# Patient Record
Sex: FEMALE | Race: WHITE | NOT HISPANIC OR LATINO
[De-identification: names, ages, dates, MRNs, and addresses within clinical notes are randomized per-mention and may not be internally consistent; named-entity substitution may affect disease eponyms.]

---

## 2019-04-26 PROBLEM — Z00.00 ENCOUNTER FOR PREVENTIVE HEALTH EXAMINATION: Status: ACTIVE | Noted: 2019-04-26

## 2019-05-28 ENCOUNTER — LABORATORY RESULT (OUTPATIENT)
Age: 79
End: 2019-05-28

## 2019-05-28 ENCOUNTER — APPOINTMENT (OUTPATIENT)
Dept: PULMONOLOGY | Facility: CLINIC | Age: 79
End: 2019-05-28
Payer: MEDICARE

## 2019-05-28 VITALS
SYSTOLIC BLOOD PRESSURE: 170 MMHG | DIASTOLIC BLOOD PRESSURE: 96 MMHG | OXYGEN SATURATION: 95 % | WEIGHT: 217 LBS | BODY MASS INDEX: 37.97 KG/M2 | TEMPERATURE: 97.2 F | HEART RATE: 99 BPM | RESPIRATION RATE: 18 BRPM | HEIGHT: 63.39 IN

## 2019-05-28 DIAGNOSIS — Z87.891 PERSONAL HISTORY OF NICOTINE DEPENDENCE: ICD-10-CM

## 2019-05-28 PROCEDURE — 94060 EVALUATION OF WHEEZING: CPT

## 2019-05-28 PROCEDURE — 99204 OFFICE O/P NEW MOD 45 MIN: CPT | Mod: 25

## 2019-05-28 PROCEDURE — 94726 PLETHYSMOGRAPHY LUNG VOLUMES: CPT

## 2019-05-28 PROCEDURE — 94729 DIFFUSING CAPACITY: CPT

## 2019-05-28 PROCEDURE — ZZZZZ: CPT

## 2019-05-28 RX ORDER — OMEPRAZOLE 40 MG/1
40 CAPSULE, DELAYED RELEASE ORAL
Refills: 0 | Status: ACTIVE | COMMUNITY

## 2019-05-28 RX ORDER — MELOXICAM 15 MG/1
15 TABLET ORAL
Refills: 0 | Status: ACTIVE | COMMUNITY

## 2019-05-28 NOTE — ASSESSMENT
[FreeTextEntry1] : 1. Moderate copd: Agree with trelegy. Discussed technique. Will also check 2D echo and BNP to r/o cardiac component. Encourage exercise, weight loss.

## 2019-05-28 NOTE — REVIEW OF SYSTEMS
[As Noted in HPI] : as noted in HPI [Hay Fever] : hay fever [Heartburn] : heartburn [Myalgias] : myalgias [Arthralgias] : arthralgias [Negative] : Psychiatric [de-identified] : easy burising

## 2019-05-28 NOTE — HISTORY OF PRESENT ILLNESS
[FreeTextEntry1] : Patient with hx of COPD, used to be on symbicort and spiriva, now on Trelegy. Has noticed worsening swelling of her lower extremities, but nonpitting. Patient has noticed coughing as well as difficulty breathing. No hx of heart disease, slightly increased A1C.

## 2019-05-28 NOTE — PHYSICAL EXAM
[Normal Appearance] : normal appearance [General Appearance - Well Developed] : well developed [No Deformities] : no deformities [Well Groomed] : well groomed [General Appearance - Well Nourished] : well nourished [General Appearance - In No Acute Distress] : no acute distress [Normal Conjunctiva] : the conjunctiva exhibited no abnormalities [Eyelids - No Xanthelasma] : the eyelids demonstrated no xanthelasmas [Normal Oropharynx] : normal oropharynx [Neck Appearance] : the appearance of the neck was normal [Neck Cervical Mass (___cm)] : no neck mass was observed [Jugular Venous Distention Increased] : there was no jugular-venous distention [Thyroid Nodule] : there were no palpable thyroid nodules [Thyroid Diffuse Enlargement] : the thyroid was not enlarged [Heart Rate And Rhythm] : heart rate and rhythm were normal [Heart Sounds] : normal S1 and S2 [Murmurs] : no murmurs present [Respiration, Rhythm And Depth] : normal respiratory rhythm and effort [Exaggerated Use Of Accessory Muscles For Inspiration] : no accessory muscle use [Auscultation Breath Sounds / Voice Sounds] : lungs were clear to auscultation bilaterally [Abdomen Soft] : soft [Abdomen Tenderness] : non-tender [Abdomen Mass (___ Cm)] : no abdominal mass palpated [Abnormal Walk] : normal gait [Gait - Sufficient For Exercise Testing] : the gait was sufficient for exercise testing [Nail Clubbing] : no clubbing of the fingernails [Cyanosis, Localized] : no localized cyanosis [Petechial Hemorrhages (___cm)] : no petechial hemorrhages [] : no ischemic changes

## 2019-05-29 LAB
ALBUMIN SERPL ELPH-MCNC: 4.5 G/DL
ALP BLD-CCNC: 79 U/L
ALT SERPL-CCNC: 17 U/L
ANION GAP SERPL CALC-SCNC: 12 MMOL/L
AST SERPL-CCNC: 19 U/L
BASOPHILS # BLD AUTO: 0.28 K/UL
BASOPHILS NFR BLD AUTO: 4.8 %
BILIRUB SERPL-MCNC: 0.4 MG/DL
BUN SERPL-MCNC: 13 MG/DL
CALCIUM SERPL-MCNC: 9.6 MG/DL
CHLORIDE SERPL-SCNC: 105 MMOL/L
CO2 SERPL-SCNC: 26 MMOL/L
CREAT SERPL-MCNC: 0.84 MG/DL
EOSINOPHIL # BLD AUTO: 0.17 K/UL
EOSINOPHIL NFR BLD AUTO: 2.9 %
GLUCOSE SERPL-MCNC: 114 MG/DL
HCT VFR BLD CALC: 39.9 %
HGB BLD-MCNC: 12.3 G/DL
IMM GRANULOCYTES NFR BLD AUTO: 0.5 %
LYMPHOCYTES # BLD AUTO: 1.72 K/UL
LYMPHOCYTES NFR BLD AUTO: 29.6 %
MAN DIFF?: NORMAL
MCHC RBC-ENTMCNC: 20.4 PG
MCHC RBC-ENTMCNC: 30.8 GM/DL
MCV RBC AUTO: 66.1 FL
MONOCYTES # BLD AUTO: 0.73 K/UL
MONOCYTES NFR BLD AUTO: 12.5 %
NEUTROPHILS # BLD AUTO: 2.89 K/UL
NEUTROPHILS NFR BLD AUTO: 49.7 %
NT-PROBNP SERPL-MCNC: 371 PG/ML
PLATELET # BLD AUTO: 403 K/UL
POTASSIUM SERPL-SCNC: 4.7 MMOL/L
PROT SERPL-MCNC: 7.9 G/DL
RBC # BLD: 6.04 M/UL
RBC # FLD: 19.2 %
SODIUM SERPL-SCNC: 143 MMOL/L
WBC # FLD AUTO: 5.82 K/UL

## 2019-06-19 ENCOUNTER — APPOINTMENT (OUTPATIENT)
Dept: CV DIAGNOSITCS | Facility: HOSPITAL | Age: 79
End: 2019-06-19
Payer: MEDICARE

## 2019-06-19 ENCOUNTER — OUTPATIENT (OUTPATIENT)
Dept: OUTPATIENT SERVICES | Facility: HOSPITAL | Age: 79
LOS: 1 days | End: 2019-06-19

## 2019-06-19 DIAGNOSIS — J44.9 CHRONIC OBSTRUCTIVE PULMONARY DISEASE, UNSPECIFIED: ICD-10-CM

## 2019-06-19 PROCEDURE — 93306 TTE W/DOPPLER COMPLETE: CPT | Mod: 26

## 2019-08-13 ENCOUNTER — APPOINTMENT (OUTPATIENT)
Dept: PULMONOLOGY | Facility: CLINIC | Age: 79
End: 2019-08-13
Payer: MEDICARE

## 2019-08-13 VITALS
HEIGHT: 63 IN | SYSTOLIC BLOOD PRESSURE: 159 MMHG | BODY MASS INDEX: 35.44 KG/M2 | OXYGEN SATURATION: 98 % | HEART RATE: 99 BPM | TEMPERATURE: 97.5 F | WEIGHT: 200 LBS | RESPIRATION RATE: 18 BRPM | DIASTOLIC BLOOD PRESSURE: 68 MMHG

## 2019-08-13 DIAGNOSIS — R06.02 SHORTNESS OF BREATH: ICD-10-CM

## 2019-08-13 PROCEDURE — 99214 OFFICE O/P EST MOD 30 MIN: CPT

## 2019-08-13 NOTE — PHYSICAL EXAM
[General Appearance - Well Developed] : well developed [Normal Appearance] : normal appearance [Well Groomed] : well groomed [General Appearance - Well Nourished] : well nourished [General Appearance - In No Acute Distress] : no acute distress [No Deformities] : no deformities [Normal Conjunctiva] : the conjunctiva exhibited no abnormalities [Eyelids - No Xanthelasma] : the eyelids demonstrated no xanthelasmas [Normal Oropharynx] : normal oropharynx [Neck Appearance] : the appearance of the neck was normal [Neck Cervical Mass (___cm)] : no neck mass was observed [Jugular Venous Distention Increased] : there was no jugular-venous distention [Thyroid Diffuse Enlargement] : the thyroid was not enlarged [Thyroid Nodule] : there were no palpable thyroid nodules [Heart Sounds] : normal S1 and S2 [Heart Rate And Rhythm] : heart rate and rhythm were normal [Murmurs] : no murmurs present [Respiration, Rhythm And Depth] : normal respiratory rhythm and effort [Exaggerated Use Of Accessory Muscles For Inspiration] : no accessory muscle use [Abdomen Soft] : soft [Auscultation Breath Sounds / Voice Sounds] : lungs were clear to auscultation bilaterally [Abdomen Tenderness] : non-tender [Abdomen Mass (___ Cm)] : no abdominal mass palpated [Abnormal Walk] : normal gait [Nail Clubbing] : no clubbing of the fingernails [Gait - Sufficient For Exercise Testing] : the gait was sufficient for exercise testing [Cyanosis, Localized] : no localized cyanosis [Petechial Hemorrhages (___cm)] : no petechial hemorrhages [] : no ischemic changes

## 2019-08-13 NOTE — ASSESSMENT
[FreeTextEntry1] : 1. Moderate copd: Resume therapy with trelegy, d/c nebulizer solutions. Discussed technique. Encourage exercise, weight loss. Will see cardiologist in about 2 weeks, expected recovery time from TAVR can be 3-4 weeks and suspect that dyspnea will continue to resolve. Valve normal on auscultation, only minimal murmur detected. return to office in 1 month.

## 2019-08-13 NOTE — END OF VISIT
[FreeTextEntry3] : I directly supervised nurse practitioner Eyal Zhu and was present during key points of his history and physical. I agree with his history, physical and assessment.\par

## 2019-08-13 NOTE — REVIEW OF SYSTEMS
[Hay Fever] : hay fever [As Noted in HPI] : as noted in HPI [Heartburn] : heartburn [Myalgias] : myalgias [Arthralgias] : arthralgias [Negative] : Psychiatric [Recent Wt Loss (___ Lbs)] : recent [unfilled] ~Ulb weight loss [Dyspnea] : dyspnea [de-identified] : easy burising

## 2019-08-13 NOTE — HISTORY OF PRESENT ILLNESS
[FreeTextEntry1] : Since last visit patient had echo performed, was called with results demonstrating severe aortic stenosis.\par \par Saw cardiologist after she was called with echo results, who referred her to [place]. She had a stent placed about 4 weeks ago, followed by a TAVR at [facility] 2 weeks ago. She was also taken off Trelegy by hospital pulmonologist and started on albuterol/ipratropium and budesonide for COPD management.\par \par Today she presents still noting dyspnea on exertion. However, she has lost some weight and is increasing her activity.

## 2019-09-12 ENCOUNTER — MEDICATION RENEWAL (OUTPATIENT)
Age: 79
End: 2019-09-12

## 2019-09-23 ENCOUNTER — APPOINTMENT (OUTPATIENT)
Dept: PULMONOLOGY | Facility: CLINIC | Age: 79
End: 2019-09-23
Payer: MEDICARE

## 2019-09-23 VITALS
SYSTOLIC BLOOD PRESSURE: 168 MMHG | DIASTOLIC BLOOD PRESSURE: 78 MMHG | WEIGHT: 205 LBS | TEMPERATURE: 97.7 F | RESPIRATION RATE: 17 BRPM | OXYGEN SATURATION: 96 % | HEIGHT: 63 IN | BODY MASS INDEX: 36.32 KG/M2 | HEART RATE: 101 BPM

## 2019-09-23 DIAGNOSIS — T46.4X5A COUGH: ICD-10-CM

## 2019-09-23 DIAGNOSIS — R91.1 SOLITARY PULMONARY NODULE: ICD-10-CM

## 2019-09-23 DIAGNOSIS — R05 COUGH: ICD-10-CM

## 2019-09-23 PROCEDURE — 99214 OFFICE O/P EST MOD 30 MIN: CPT

## 2019-09-23 RX ORDER — LOSARTAN POTASSIUM 50 MG/1
50 TABLET, FILM COATED ORAL DAILY
Qty: 90 | Refills: 0 | Status: ACTIVE | COMMUNITY
Start: 2019-09-23 | End: 1900-01-01

## 2019-09-23 NOTE — CONSULT LETTER
[Dear  ___] : Dear  [unfilled], [Consult Letter:] : I had the pleasure of evaluating your patient, [unfilled]. [Sincerely,] : Sincerely, [Please see my note below.] : Please see my note below. [DrFiliberto  ___] : Dr. PAYNE [FreeTextEntry3] : Rush Beal [FreeTextEntry2] : Dr. Poli Billings\par [address]\par [facility]\par \par Dr. Blake Godinez - Interventional Cardiologist\par [address]\[place] [address]\par Ph [phone]\par Fax [phone]

## 2019-09-23 NOTE — HISTORY OF PRESENT ILLNESS
[FreeTextEntry1] : 80yo F with PMH AS s/p TAVR (July 2019), COPD presents for follow up.\par \par Since last visit patient believes her breathing is somewhat improved on Trelegy, however she has developed a persistent cough that will not go away. Cough has been present for a few weeks. On review, she takes Lisinopril 20mg and states that her cough began shortly after starting the medication. She suspected one of her cardiac medications and she self d/kathy both lisinopril and brilinta. Fortunately, she had a f/u visit with Dr. Godinez soon after in which he advised her on the risks involved with stopping brilinta and she resumed both medications. However, cough persists and is constant. Wakes her up every night. She sometimes has some clear phlegm, but most commonly dry. She notes that it is very different from her "COPD cough."\par \par She also presents with imaging, but no report of a CT performed at [facility] on 7/10/19 during TAVR hospitalization. Review demonstrates a suspicious mass in the RLL. She was told at [facility] to f/u in 3 months and not to worry about it.

## 2019-09-23 NOTE — ASSESSMENT
[FreeTextEntry1] : 1. Moderate copd: Continue therapy as directed, patient believes to have slightly improved symptoms however impossible to tell with cough present.\par \par 2. Cough: Given severity and frequency of her cough, will change lisinopril 20mg to Losartan 50mg as ACE-induced cough is most likely etiology. Patient instructed to f/u with Dr. Godinez regarding change from ACE-I to ARB as he may have an alternative preferred ARB.\par \par 3. Pulmonary nodule: No report available to review, but images uploaded to PACS. Will repeat CT scan now, at 3 month interval as suggested by [place] for comparison by our radiologists.\par \par Return to office in 2-[address] scan.

## 2019-09-23 NOTE — REVIEW OF SYSTEMS
[Recent Wt Loss (___ Lbs)] : recent [unfilled] ~Ulb weight loss [As Noted in HPI] : as noted in HPI [Dyspnea] : dyspnea [Hay Fever] : hay fever [Heartburn] : heartburn [Myalgias] : myalgias [Arthralgias] : arthralgias [Negative] : Psychiatric [de-identified] : easy burising

## 2019-09-23 NOTE — PHYSICAL EXAM
[General Appearance - Well Developed] : well developed [Normal Appearance] : normal appearance [General Appearance - Well Nourished] : well nourished [Well Groomed] : well groomed [No Deformities] : no deformities [General Appearance - In No Acute Distress] : no acute distress [Normal Conjunctiva] : the conjunctiva exhibited no abnormalities [Eyelids - No Xanthelasma] : the eyelids demonstrated no xanthelasmas [Normal Oropharynx] : normal oropharynx [Neck Appearance] : the appearance of the neck was normal [Neck Cervical Mass (___cm)] : no neck mass was observed [Jugular Venous Distention Increased] : there was no jugular-venous distention [Thyroid Diffuse Enlargement] : the thyroid was not enlarged [Thyroid Nodule] : there were no palpable thyroid nodules [Heart Rate And Rhythm] : heart rate and rhythm were normal [Murmurs] : no murmurs present [Heart Sounds] : normal S1 and S2 [Respiration, Rhythm And Depth] : normal respiratory rhythm and effort [Exaggerated Use Of Accessory Muscles For Inspiration] : no accessory muscle use [Abdomen Soft] : soft [Abdomen Tenderness] : non-tender [Abdomen Mass (___ Cm)] : no abdominal mass palpated [Abnormal Walk] : normal gait [Gait - Sufficient For Exercise Testing] : the gait was sufficient for exercise testing [Nail Clubbing] : no clubbing of the fingernails [Cyanosis, Localized] : no localized cyanosis [Petechial Hemorrhages (___cm)] : no petechial hemorrhages [] : no ischemic changes [FreeTextEntry1] : crackles b/l bases

## 2019-10-02 ENCOUNTER — OUTPATIENT (OUTPATIENT)
Dept: OUTPATIENT SERVICES | Facility: HOSPITAL | Age: 79
LOS: 1 days | End: 2019-10-02
Payer: COMMERCIAL

## 2019-10-02 ENCOUNTER — APPOINTMENT (OUTPATIENT)
Dept: CT IMAGING | Facility: CLINIC | Age: 79
End: 2019-10-02
Payer: MEDICARE

## 2019-10-02 DIAGNOSIS — R91.1 SOLITARY PULMONARY NODULE: ICD-10-CM

## 2019-10-02 PROCEDURE — 71250 CT THORAX DX C-: CPT

## 2019-10-02 PROCEDURE — 71250 CT THORAX DX C-: CPT | Mod: 26

## 2019-10-03 ENCOUNTER — CLINICAL ADVICE (OUTPATIENT)
Age: 79
End: 2019-10-03

## 2019-10-28 ENCOUNTER — CLINICAL ADVICE (OUTPATIENT)
Age: 79
End: 2019-10-28

## 2019-10-30 RX ORDER — BUDESONIDE AND FORMOTEROL FUMARATE DIHYDRATE 160; 4.5 UG/1; UG/1
160-4.5 AEROSOL RESPIRATORY (INHALATION) TWICE DAILY
Qty: 1 | Refills: 11 | Status: ACTIVE | COMMUNITY
Start: 2019-10-30

## 2019-10-30 RX ORDER — TIOTROPIUM BROMIDE 18 UG/1
18 CAPSULE ORAL; RESPIRATORY (INHALATION)
Qty: 1 | Refills: 11 | Status: ACTIVE | COMMUNITY
Start: 2019-10-30

## 2019-10-30 RX ORDER — FLUTICASONE FUROATE, UMECLIDINIUM BROMIDE AND VILANTEROL TRIFENATATE 100; 62.5; 25 UG/1; UG/1; UG/1
100-62.5-25 POWDER RESPIRATORY (INHALATION) DAILY
Qty: 3 | Refills: 1 | Status: DISCONTINUED | COMMUNITY
Start: 2019-05-28 | End: 2019-10-30

## 2020-08-11 ENCOUNTER — APPOINTMENT (OUTPATIENT)
Dept: PULMONOLOGY | Facility: CLINIC | Age: 80
End: 2020-08-11
Payer: MEDICARE

## 2020-08-11 VITALS
HEART RATE: 94 BPM | BODY MASS INDEX: 37.21 KG/M2 | OXYGEN SATURATION: 96 % | SYSTOLIC BLOOD PRESSURE: 162 MMHG | HEIGHT: 63 IN | DIASTOLIC BLOOD PRESSURE: 80 MMHG | RESPIRATION RATE: 16 BRPM | TEMPERATURE: 99.1 F | WEIGHT: 210 LBS

## 2020-08-11 PROCEDURE — 99214 OFFICE O/P EST MOD 30 MIN: CPT

## 2020-08-11 RX ORDER — BUDESONIDE, GLYCOPYRROLATE, AND FORMOTEROL FUMARATE 160; 9; 4.8 UG/1; UG/1; UG/1
160-9-4.8 AEROSOL, METERED RESPIRATORY (INHALATION)
Qty: 1 | Refills: 11 | Status: ACTIVE | COMMUNITY
Start: 2020-08-11 | End: 1900-01-01

## 2020-08-11 RX ORDER — AZITHROMYCIN 250 MG/1
250 TABLET, FILM COATED ORAL
Qty: 1 | Refills: 0 | Status: ACTIVE | COMMUNITY
Start: 2020-08-11 | End: 1900-01-01

## 2020-08-11 NOTE — PHYSICAL EXAM
[Normal Appearance] : normal appearance [General Appearance - Well Developed] : well developed [Well Groomed] : well groomed [General Appearance - Well Nourished] : well nourished [No Deformities] : no deformities [General Appearance - In No Acute Distress] : no acute distress [Normal Conjunctiva] : the conjunctiva exhibited no abnormalities [Normal Oropharynx] : normal oropharynx [Eyelids - No Xanthelasma] : the eyelids demonstrated no xanthelasmas [Neck Cervical Mass (___cm)] : no neck mass was observed [Neck Appearance] : the appearance of the neck was normal [Jugular Venous Distention Increased] : there was no jugular-venous distention [Thyroid Diffuse Enlargement] : the thyroid was not enlarged [Heart Rate And Rhythm] : heart rate and rhythm were normal [Thyroid Nodule] : there were no palpable thyroid nodules [Heart Sounds] : normal S1 and S2 [Murmurs] : no murmurs present [Exaggerated Use Of Accessory Muscles For Inspiration] : no accessory muscle use [Respiration, Rhythm And Depth] : normal respiratory rhythm and effort [Abdomen Soft] : soft [Abdomen Tenderness] : non-tender [Abdomen Mass (___ Cm)] : no abdominal mass palpated [Abnormal Walk] : normal gait [Gait - Sufficient For Exercise Testing] : the gait was sufficient for exercise testing [Nail Clubbing] : no clubbing of the fingernails [Cyanosis, Localized] : no localized cyanosis [Petechial Hemorrhages (___cm)] : no petechial hemorrhages [] : no ischemic changes [Non-Pitting] : non-pitting [FreeTextEntry1] : crackles b/l bases

## 2020-08-11 NOTE — ASSESSMENT
[FreeTextEntry1] : 1. Moderate COPD: Now with worsening dyspnea. Will give zpak given patient has discolored sputum. Will try to have Breztri approved with insurance, otherwise continue therapy as directed. SpO2 is 96% today on room air. Unclear etiology of worsening symptoms at this time and will order PFT to r/o worsening of lung function. F/u in office after PFT.\par \par I, Eyal Zhu NP, am scribing for and in the presence of Dr. Rush Beal, the following sections HISTORY OF PRESENT ILLNESS, PAST MEDICAL/FAMILY/SOCIAL HISTORY; REVIEW OF SYSTEMS; VITAL SIGNS; PHYSICAL EXAM; DISPOSITION.

## 2020-08-11 NOTE — CONSULT LETTER
[Dear  ___] : Dear  [unfilled], [Consult Letter:] : I had the pleasure of evaluating your patient, [unfilled]. [Please see my note below.] : Please see my note below. [Sincerely,] : Sincerely, [DrFiliberto  ___] : Dr. PAYNE [FreeTextEntry2] : Dr. Poli Billings\par [address]\par [facility]\par \par Dr. Blake Godinez - Interventional Cardiologist\par [address]\[place] [address]\par Ph [phone]\par Fax [phone] [FreeTextEntry3] : Rush Beal

## 2020-08-11 NOTE — REVIEW OF SYSTEMS
[As Noted in HPI] : as noted in HPI [Recent Wt Loss (___ Lbs)] : recent [unfilled] ~Ulb weight loss [Dyspnea] : dyspnea [Hay Fever] : hay fever [Heartburn] : heartburn [Myalgias] : myalgias [Arthralgias] : arthralgias [Negative] : Psychiatric [de-identified] : easy burising

## 2020-08-11 NOTE — HISTORY OF PRESENT ILLNESS
[TextBox_4] : 79yo F with PMH AS s/p TAVR (July 2019), COPD presents for follow up.\par \par She has been compliant with Symbicort and Spiriva but feels that her shortness of breath has worsened recently. Saw Dr. Godinez (cardiologist) who performed an echo and LE doppler a few weeks ago and referred her to see pulmonary as there was no evidence for cardiac etiology.\par \par She denies chest pain, no increased swelling in the legs. She notices her dyspnea more when walking, particularly on an incline. Sometimes gets dizzy when doing chores in the house. She notes wheezing, particularly when she lies down. She also notes some congestion and yellow phlegm.\par \par TTE results reviewed and demonstrate no AS following TAVR and that there are no worsening of cardiac function.

## 2020-09-11 DIAGNOSIS — Z01.818 ENCOUNTER FOR OTHER PREPROCEDURAL EXAMINATION: ICD-10-CM

## 2020-09-12 ENCOUNTER — APPOINTMENT (OUTPATIENT)
Dept: DISASTER EMERGENCY | Facility: CLINIC | Age: 80
End: 2020-09-12

## 2020-09-13 LAB — SARS-COV-2 N GENE NPH QL NAA+PROBE: NOT DETECTED

## 2020-09-16 ENCOUNTER — APPOINTMENT (OUTPATIENT)
Dept: PULMONOLOGY | Facility: CLINIC | Age: 80
End: 2020-09-16
Payer: MEDICARE

## 2020-09-16 VITALS
HEIGHT: 64 IN | RESPIRATION RATE: 17 BRPM | HEART RATE: 95 BPM | DIASTOLIC BLOOD PRESSURE: 72 MMHG | OXYGEN SATURATION: 95 % | SYSTOLIC BLOOD PRESSURE: 148 MMHG | TEMPERATURE: 98.1 F | WEIGHT: 211 LBS | BODY MASS INDEX: 36.02 KG/M2

## 2020-09-16 PROCEDURE — ZZZZZ: CPT

## 2020-09-16 PROCEDURE — 99214 OFFICE O/P EST MOD 30 MIN: CPT | Mod: 25

## 2020-09-16 PROCEDURE — 94010 BREATHING CAPACITY TEST: CPT

## 2020-09-16 PROCEDURE — 94729 DIFFUSING CAPACITY: CPT

## 2020-09-16 PROCEDURE — 94726 PLETHYSMOGRAPHY LUNG VOLUMES: CPT

## 2020-09-16 NOTE — CONSULT LETTER
[Dear  ___] : Dear  [unfilled], [Consult Letter:] : I had the pleasure of evaluating your patient, [unfilled]. [Please see my note below.] : Please see my note below. [Sincerely,] : Sincerely, [DrFiliberto  ___] : Dr. PAYNE [FreeTextEntry2] : Dr. Poli Billings\par [address]\par [facility]\par \par Dr. Blake Godinez - Interventional Cardiologist\par [address]\[place] [address]\par Ph [phone]\par Fax [phone] [FreeTextEntry3] : Rush Beal

## 2020-09-16 NOTE — REVIEW OF SYSTEMS
[Recent Wt Loss (___ Lbs)] : recent [unfilled] ~Ulb weight loss [As Noted in HPI] : as noted in HPI [Dyspnea] : dyspnea [Hay Fever] : hay fever [Myalgias] : myalgias [Heartburn] : heartburn [Arthralgias] : arthralgias [Negative] : Psychiatric [de-identified] : easy burising

## 2020-09-16 NOTE — ASSESSMENT
[FreeTextEntry1] : 1. Severe COPD: Now with evidence of worsening obstruction on PFT and worsened symptoms. Once available, will try to have Everardoi approved with insurance, otherwise continue therapy as directed. Will provide sample when arrives at office. Advised weight loss and exercise, f/u in 3 months.\par \par I, Eyal Zhu NP, am scribing for and in the presence of Dr. Rush Beal, the following sections HISTORY OF PRESENT ILLNESS, PAST MEDICAL/FAMILY/SOCIAL HISTORY; REVIEW OF SYSTEMS; VITAL SIGNS; PHYSICAL EXAM; DISPOSITION.

## 2020-09-16 NOTE — HISTORY OF PRESENT ILLNESS
[TextBox_4] : 81yo F with PMH AS s/p TAVR (July 2019), COPD presents for follow up.\par \par Remains on Symbicort and Spiriva as Breztri is not yet available. Symptomatically stable since last visit, however is worsened over the past year. Swelling in legs stable.\par \par PFT today demonstrates severe obstruction, worsened from PFT in 2019.\par \par She has tried Trelegy in the past, developed oral thrush and does not want to try another dry-powder inhaler.\par \stormy Has had PNA and influenza vaccinations this year with PCP.

## 2020-09-16 NOTE — PHYSICAL EXAM
[General Appearance - Well Developed] : well developed [Normal Appearance] : normal appearance [Well Groomed] : well groomed [General Appearance - Well Nourished] : well nourished [No Deformities] : no deformities [General Appearance - In No Acute Distress] : no acute distress [Normal Conjunctiva] : the conjunctiva exhibited no abnormalities [Normal Oropharynx] : normal oropharynx [Eyelids - No Xanthelasma] : the eyelids demonstrated no xanthelasmas [Jugular Venous Distention Increased] : there was no jugular-venous distention [Neck Cervical Mass (___cm)] : no neck mass was observed [Neck Appearance] : the appearance of the neck was normal [Thyroid Nodule] : there were no palpable thyroid nodules [Thyroid Diffuse Enlargement] : the thyroid was not enlarged [Murmurs] : no murmurs present [Heart Rate And Rhythm] : heart rate and rhythm were normal [Heart Sounds] : normal S1 and S2 [Respiration, Rhythm And Depth] : normal respiratory rhythm and effort [Exaggerated Use Of Accessory Muscles For Inspiration] : no accessory muscle use [Abdomen Tenderness] : non-tender [Abdomen Soft] : soft [Abdomen Mass (___ Cm)] : no abdominal mass palpated [Abnormal Walk] : normal gait [Gait - Sufficient For Exercise Testing] : the gait was sufficient for exercise testing [Nail Clubbing] : no clubbing of the fingernails [Petechial Hemorrhages (___cm)] : no petechial hemorrhages [Cyanosis, Localized] : no localized cyanosis [] : no ischemic changes [Non-Pitting] : non-pitting [FreeTextEntry1] : crackles b/l bases

## 2020-11-29 ENCOUNTER — EMERGENCY (EMERGENCY)
Facility: HOSPITAL | Age: 80
LOS: 1 days | Discharge: HOME | End: 2020-11-29
Attending: EMERGENCY MEDICINE | Admitting: EMERGENCY MEDICINE
Payer: MEDICARE

## 2020-11-29 VITALS
DIASTOLIC BLOOD PRESSURE: 96 MMHG | HEART RATE: 108 BPM | SYSTOLIC BLOOD PRESSURE: 151 MMHG | OXYGEN SATURATION: 98 % | TEMPERATURE: 98 F | RESPIRATION RATE: 16 BRPM

## 2020-11-29 VITALS
OXYGEN SATURATION: 100 % | HEART RATE: 107 BPM | DIASTOLIC BLOOD PRESSURE: 59 MMHG | RESPIRATION RATE: 22 BRPM | SYSTOLIC BLOOD PRESSURE: 146 MMHG

## 2020-11-29 PROCEDURE — 12002 RPR S/N/AX/GEN/TRNK2.6-7.5CM: CPT

## 2020-11-29 PROCEDURE — 73630 X-RAY EXAM OF FOOT: CPT | Mod: 26,RT

## 2020-11-29 PROCEDURE — 99283 EMERGENCY DEPT VISIT LOW MDM: CPT | Mod: 25

## 2020-11-29 RX ORDER — TETANUS TOXOID, REDUCED DIPHTHERIA TOXOID AND ACELLULAR PERTUSSIS VACCINE, ADSORBED 5; 2.5; 8; 8; 2.5 [IU]/.5ML; [IU]/.5ML; UG/.5ML; UG/.5ML; UG/.5ML
0.5 SUSPENSION INTRAMUSCULAR ONCE
Refills: 0 | Status: COMPLETED | OUTPATIENT
Start: 2020-11-29 | End: 2020-11-29

## 2020-11-29 RX ADMIN — TETANUS TOXOID, REDUCED DIPHTHERIA TOXOID AND ACELLULAR PERTUSSIS VACCINE, ADSORBED 0.5 MILLILITER(S): 5; 2.5; 8; 8; 2.5 SUSPENSION INTRAMUSCULAR at 19:39

## 2020-11-29 NOTE — ED ADULT TRIAGE NOTE - CHIEF COMPLAINT QUOTE
states" I got cut on top of the foot from a glass pot" EMS states there was a lot of blood on the floor at the scene. dressing noted on the foot with minimal oozing. patient on baby ASA, Lasix . states" I got cut on top of the right foot from a glass pot" states she was carrying a glass pot and it fell on her foot and shattered and cut her foot. EMS states there was a lot of blood on the floor at the scene. dressing noted on the foot with minimal oozing. patient  is on baby ASA, Lasix .patient feels light headed in triage.

## 2020-11-29 NOTE — ED PROVIDER NOTE - NSFOLLOWUPINSTRUCTIONS_ED_ALL_ED_FT
You were evaluated in the Emergency Department for a laceration.  You were evaluated and examined by a physician, and based on your evaluation, there are no signs of emergency conditions requiring admission to the hospital on today's workup.     We recommend that you:  1. See your primary care physician within the next week for follow up.  Bring a copy of your discharge paperwork (including any test results) to your doctor.  2. Continue all home medications as prescribed.       Laceration    A laceration is a cut that goes through all of the layers of the skin and into the tissue that is right under the skin. Some lacerations heal on their own. Others need to be closed with skin adhesive strips, skin glue, stitches (sutures), or staples. Proper laceration care minimizes the risk of infection and helps the laceration to heal better.  If non-absorbable stitches or staples have been placed, they must be taken out within the time frame instructed by your healthcare provider.    SEEK IMMEDIATE MEDICAL CARE IF YOU HAVE ANY OF THE FOLLOWING SYMPTOMS: swelling around the wound, worsening pain, drainage from the wound, red streaking going away from your wound, inability to move finger or toe near the laceration, or discoloration of skin near the laceration.

## 2020-11-29 NOTE — ED PROVIDER NOTE - PATIENT PORTAL LINK FT
You can access the FollowMyHealth Patient Portal offered by [facility] by registering at the following website: [email or web address]. By joining "[employer]’s FollowMyHealth portal, you will also be able to view your health information using other applications (apps) compatible with our system.

## 2020-11-29 NOTE — ED ADULT NURSE NOTE - OBJECTIVE STATEMENT
Pt presents to ED R#13 c/o laceration to ventral R foot, states glass pot shattered and piece of glass lodged into foot, pulled it out and says laceration began oozing heavily. Now gauze in place, no active bleeding noted. Has hx of COPD not on home O2. Reports SOB, but states "this is normal for me." Denies SOB worse than at baseline. Unsure of TDAp status. Denies any other complaints at this time. Is in NAD, breathing even and unlabored, appears mildly anxious.

## 2020-11-29 NOTE — ED ADULT NURSE NOTE - CHIEF COMPLAINT QUOTE
states" I got cut on top of the right foot from a glass pot" states she was carrying a glass pot and it fell on her foot and shattered and cut her foot. EMS states there was a lot of blood on the floor at the scene. dressing noted on the foot with minimal oozing. patient  is on baby ASA, Lasix .patient feels light headed in triage.

## 2020-11-29 NOTE — ED PROVIDER NOTE - ATTENDING CONTRIBUTION TO CARE
alem: pt with wound to foot, rported heavy bleeding at home, periferally is vascular/ neuro intact.  wound currently looks fairly superficial but has wide area of ischemic skin that needs to be debrided and wound would need suturing.  there is a smaller 0.5 cm wound in line with the first, pt believes that is where the majority of blood came from.  after xray will numb and clean to determine depth and whether there is a lacerated artery/ vein.    I performed a history and physical exam of the patient and discussed their management with the resident and /or advanced care provider. I reviewed the resident and /or ACP's note and agree with the documented findings and plan of care. My medical decison making and observations are found above.

## 2020-11-29 NOTE — ED PROVIDER NOTE - NS ED ROS FT
Gen: Denies fevers, chills  MSK: + laceration of dorsum of right foot  Neuro: Denies numbness, tingling, weakness, loc

## 2020-11-29 NOTE — ED PROVIDER NOTE - PHYSICAL EXAMINATION
Gen: Alert and oriented. Lying comfortably in bed. Answering questions appropriately  CV: Pulses equal and intact bl in LE.   Resp: Clear to ausculation bilaterally, no wheezes/rhonchi/rales  MSK: bleeding superficial 3 cm laceration at dorsum of right foot. ROM intact at RLE. no LE edema,   Neuro: A&Ox4, following commands, moving all four extremities spontaneously, Sensation and strength intact at RLE  Psych: appropriate mood

## 2020-11-29 NOTE — ED PROVIDER NOTE - OBJECTIVE STATEMENT
79 yo F with laceration on dorsum of right foot after glass fell on it today. Denies numbness, tingling at RLE. Unknown if tetanus is up to date.

## 2020-11-29 NOTE — ED PROVIDER NOTE - CLINICAL SUMMARY MEDICAL DECISION MAKING FREE TEXT BOX
Joseph Frankel PGY2: 81 yo with laceration. VSS. Patient looks well and is non toxic appearing. PE as above. No sign of foreign body on exam. Will get xray to further ro. Will up date tetanus. Will explore wound to assess proper closure. Will reassess. Joseph Frankel PGY2: 81 yo with laceration. VSS. Patient looks well and is non toxic appearing. PE as above. No sign of foreign body on exam. Will get xray to further ro. Will up date tetanus. Will explore wound to assess proper closure. Will reassess.    alem: pt with wound to foot, rported heavy bleeding at home, periferally is vascular/ neuro intact.  wound currently looks fairly superficial but has wide area of ischemic skin that needs to be debrided and wound would need suturing.  there is a smaller 0.5 cm wound in line with the first, pt believes that is where the majority of blood came from.  after xray will numb and clean to determine depth and whether there is a lacerated artery/ vein.

## 2021-02-02 ENCOUNTER — NON-APPOINTMENT (OUTPATIENT)
Age: 81
End: 2021-02-02

## 2021-03-23 ENCOUNTER — APPOINTMENT (OUTPATIENT)
Dept: PULMONOLOGY | Facility: CLINIC | Age: 81
End: 2021-03-23
Payer: MEDICARE

## 2021-03-23 VITALS
DIASTOLIC BLOOD PRESSURE: 75 MMHG | TEMPERATURE: 97.5 F | SYSTOLIC BLOOD PRESSURE: 156 MMHG | HEART RATE: 87 BPM | OXYGEN SATURATION: 94 % | RESPIRATION RATE: 18 BRPM | HEIGHT: 60 IN

## 2021-03-23 DIAGNOSIS — J44.9 CHRONIC OBSTRUCTIVE PULMONARY DISEASE, UNSPECIFIED: ICD-10-CM

## 2021-03-23 PROCEDURE — 99214 OFFICE O/P EST MOD 30 MIN: CPT

## 2021-03-23 PROCEDURE — 99072 ADDL SUPL MATRL&STAF TM PHE: CPT

## 2021-03-23 RX ORDER — BUDESONIDE, GLYCOPYRROLATE, AND FORMOTEROL FUMARATE 160; 9; 4.8 UG/1; UG/1; UG/1
160-9-4.8 AEROSOL, METERED RESPIRATORY (INHALATION)
Qty: 1 | Refills: 11 | Status: ACTIVE | COMMUNITY
Start: 2021-03-23 | End: 1900-01-01

## 2021-03-23 NOTE — CONSULT LETTER
[Dear  ___] : Dear  [unfilled], [Consult Letter:] : I had the pleasure of evaluating your patient, [unfilled]. [Consult Closing:] : Thank you very much for allowing me to participate in the care of this patient.  If you have any questions, please do not hesitate to contact me. [Sincerely,] : Sincerely, [FreeTextEntry2] : Poli Billings [FreeTextEntry3] : Rush Beal

## 2021-03-23 NOTE — PHYSICAL EXAM
[No Acute Distress] : no acute distress [Normal Oropharynx] : normal oropharynx [Normal Appearance] : normal appearance [No Neck Mass] : no neck mass [Normal Rate/Rhythm] : normal rate/rhythm [Normal S1, S2] : normal s1, s2 [No Murmurs] : no murmurs [No Resp Distress] : no resp distress [No Abnormalities] : no abnormalities [Benign] : benign [Normal Gait] : normal gait [No Clubbing] : no clubbing [No Cyanosis] : no cyanosis [No Edema] : no edema [FROM] : FROM [Normal Color/ Pigmentation] : normal color/ pigmentation [No Focal Deficits] : no focal deficits [Oriented x3] : oriented x3 [Normal Affect] : normal affect [TextBox_68] : decreased breath sounds

## 2021-03-23 NOTE — HISTORY OF PRESENT ILLNESS
[TextBox_4] : Patient notes continued dyspnea, wonders if a change in medication to Breztri might be helpful. NOtes high blood sugar, Dr. Billings wants patient to lose some weight. Got the covid vaccine. Notes severe cough, even with cough medicine (hydromet).

## 2021-05-17 ENCOUNTER — NON-APPOINTMENT (OUTPATIENT)
Age: 81
End: 2021-05-17

## 2021-05-17 RX ORDER — PREDNISONE 20 MG/1
20 TABLET ORAL DAILY
Qty: 10 | Refills: 0 | Status: ACTIVE | COMMUNITY
Start: 2021-05-17 | End: 1900-01-01

## 2021-05-17 RX ORDER — LEVOFLOXACIN 500 MG/1
500 TABLET, FILM COATED ORAL DAILY
Qty: 7 | Refills: 0 | Status: ACTIVE | COMMUNITY
Start: 2021-05-17 | End: 1900-01-01

## 2021-05-17 RX ORDER — ALBUTEROL SULFATE 2.5 MG/3ML
(2.5 MG/3ML) SOLUTION RESPIRATORY (INHALATION)
Qty: 1 | Refills: 11 | Status: ACTIVE | COMMUNITY
Start: 2021-05-17 | End: 1900-01-01

## 2021-05-17 RX ORDER — ALBUTEROL SULFATE 90 UG/1
108 (90 BASE) AEROSOL, METERED RESPIRATORY (INHALATION)
Qty: 1 | Refills: 3 | Status: ACTIVE | COMMUNITY
Start: 2021-05-17 | End: 1900-01-01

## 2021-06-28 ENCOUNTER — APPOINTMENT (OUTPATIENT)
Dept: DISASTER EMERGENCY | Facility: CLINIC | Age: 81
End: 2021-06-28

## 2021-07-02 ENCOUNTER — APPOINTMENT (OUTPATIENT)
Dept: PULMONOLOGY | Facility: CLINIC | Age: 81
End: 2021-07-02

## 2022-03-06 NOTE — CONSULT LETTER
[Dear  ___] : Dear  [unfilled], [Consult Letter:] : I had the pleasure of evaluating your patient, [unfilled]. [Please see my note below.] : Please see my note below. [Sincerely,] : Sincerely, [FreeTextEntry2] : Dr. Poli Billings\par [address]\par [facility] [FreeTextEntry3] : Cliff Beal altered mental status

## 2024-07-05 ENCOUNTER — APPOINTMENT (OUTPATIENT)
Dept: ORTHOPEDIC SURGERY | Facility: CLINIC | Age: 84
End: 2024-07-05

## 2025-03-10 ENCOUNTER — APPOINTMENT (OUTPATIENT)
Dept: ORTHOPEDIC SURGERY | Facility: CLINIC | Age: 85
End: 2025-03-10
Payer: MEDICARE

## 2025-03-10 VITALS — BODY MASS INDEX: 41.43 KG/M2 | HEIGHT: 60 IN | WEIGHT: 211 LBS

## 2025-03-10 DIAGNOSIS — J44.9 CHRONIC OBSTRUCTIVE PULMONARY DISEASE, UNSPECIFIED: ICD-10-CM

## 2025-03-10 DIAGNOSIS — I10 ESSENTIAL (PRIMARY) HYPERTENSION: ICD-10-CM

## 2025-03-10 DIAGNOSIS — M54.16 RADICULOPATHY, LUMBAR REGION: ICD-10-CM

## 2025-03-10 DIAGNOSIS — S23.9XXA SPRAIN OF UNSPECIFIED PARTS OF THORAX, INITIAL ENCOUNTER: ICD-10-CM

## 2025-03-10 DIAGNOSIS — M19.90 UNSPECIFIED OSTEOARTHRITIS, UNSPECIFIED SITE: ICD-10-CM

## 2025-03-10 DIAGNOSIS — I51.9 HEART DISEASE, UNSPECIFIED: ICD-10-CM

## 2025-03-10 PROCEDURE — 72170 X-RAY EXAM OF PELVIS: CPT

## 2025-03-10 PROCEDURE — 99204 OFFICE O/P NEW MOD 45 MIN: CPT

## 2025-03-10 PROCEDURE — 72070 X-RAY EXAM THORAC SPINE 2VWS: CPT

## 2025-03-10 PROCEDURE — 72110 X-RAY EXAM L-2 SPINE 4/>VWS: CPT

## 2025-03-10 RX ORDER — HYDROCODONE BITARTRATE AND ACETAMINOPHEN 5; 325 MG/1; MG/1
5-325 TABLET ORAL 3 TIMES DAILY
Qty: 60 | Refills: 0 | Status: ACTIVE | COMMUNITY
Start: 2025-03-10 | End: 1900-01-01

## 2025-03-18 ENCOUNTER — APPOINTMENT (OUTPATIENT)
Dept: MRI IMAGING | Facility: CLINIC | Age: 85
End: 2025-03-18
Payer: MEDICARE

## 2025-03-18 PROCEDURE — 72146 MRI CHEST SPINE W/O DYE: CPT

## 2025-03-18 PROCEDURE — 72148 MRI LUMBAR SPINE W/O DYE: CPT

## 2025-03-26 ENCOUNTER — APPOINTMENT (OUTPATIENT)
Dept: ORTHOPEDIC SURGERY | Facility: CLINIC | Age: 85
End: 2025-03-26
Payer: MEDICARE

## 2025-03-26 DIAGNOSIS — S32.130K: ICD-10-CM

## 2025-03-26 DIAGNOSIS — S23.9XXA SPRAIN OF UNSPECIFIED PARTS OF THORAX, INITIAL ENCOUNTER: ICD-10-CM

## 2025-03-26 DIAGNOSIS — S32.130D: ICD-10-CM

## 2025-03-26 DIAGNOSIS — M54.16 RADICULOPATHY, LUMBAR REGION: ICD-10-CM

## 2025-03-26 PROCEDURE — 99214 OFFICE O/P EST MOD 30 MIN: CPT

## 2025-03-26 RX ORDER — TRAMADOL HYDROCHLORIDE 50 MG/1
50 TABLET, COATED ORAL 3 TIMES DAILY
Qty: 90 | Refills: 0 | Status: ACTIVE | COMMUNITY
Start: 2025-03-26 | End: 1900-01-01

## 2025-03-31 ENCOUNTER — INPATIENT (INPATIENT)
Facility: HOSPITAL | Age: 85
LOS: 3 days | Discharge: ANOTHER HOSPITAL | DRG: 872 | End: 2025-04-04
Attending: HOSPITALIST | Admitting: HOSPITALIST
Payer: MEDICARE

## 2025-03-31 VITALS
WEIGHT: 293 LBS | RESPIRATION RATE: 28 BRPM | HEIGHT: 64 IN | HEART RATE: 130 BPM | TEMPERATURE: 100 F | DIASTOLIC BLOOD PRESSURE: 80 MMHG | SYSTOLIC BLOOD PRESSURE: 141 MMHG | OXYGEN SATURATION: 94 %

## 2025-03-31 DIAGNOSIS — A41.9 SEPSIS, UNSPECIFIED ORGANISM: ICD-10-CM

## 2025-03-31 LAB
ALBUMIN SERPL ELPH-MCNC: 4 G/DL — SIGNIFICANT CHANGE UP (ref 3.3–5)
ALP SERPL-CCNC: 87 U/L — SIGNIFICANT CHANGE UP (ref 40–120)
ALT FLD-CCNC: 25 U/L — SIGNIFICANT CHANGE UP (ref 10–45)
ANION GAP SERPL CALC-SCNC: 19 MMOL/L — HIGH (ref 5–17)
ANISOCYTOSIS BLD QL: SLIGHT — SIGNIFICANT CHANGE UP
APPEARANCE UR: ABNORMAL
APTT BLD: 34.9 SEC — SIGNIFICANT CHANGE UP (ref 24.5–35.6)
AST SERPL-CCNC: 56 U/L — HIGH (ref 10–40)
BACTERIA # UR AUTO: NEGATIVE /HPF — SIGNIFICANT CHANGE UP
BASOPHILS # BLD AUTO: 0 K/UL — SIGNIFICANT CHANGE UP (ref 0–0.2)
BASOPHILS NFR BLD AUTO: 0 % — SIGNIFICANT CHANGE UP (ref 0–2)
BILIRUB SERPL-MCNC: 1.3 MG/DL — HIGH (ref 0.2–1.2)
BILIRUB UR-MCNC: NEGATIVE — SIGNIFICANT CHANGE UP
BUN SERPL-MCNC: 15 MG/DL — SIGNIFICANT CHANGE UP (ref 7–23)
CALCIUM SERPL-MCNC: 9.3 MG/DL — SIGNIFICANT CHANGE UP (ref 8.4–10.5)
CAST: 4 /LPF — SIGNIFICANT CHANGE UP (ref 0–4)
CHLORIDE SERPL-SCNC: 98 MMOL/L — SIGNIFICANT CHANGE UP (ref 96–108)
CO2 SERPL-SCNC: 22 MMOL/L — SIGNIFICANT CHANGE UP (ref 22–31)
COLOR SPEC: SIGNIFICANT CHANGE UP
CREAT SERPL-MCNC: 0.93 MG/DL — SIGNIFICANT CHANGE UP (ref 0.5–1.3)
DIFF PNL FLD: ABNORMAL
EGFR: 61 ML/MIN/1.73M2 — SIGNIFICANT CHANGE UP
EGFR: 61 ML/MIN/1.73M2 — SIGNIFICANT CHANGE UP
ELLIPTOCYTES BLD QL SMEAR: SLIGHT — SIGNIFICANT CHANGE UP
EOSINOPHIL # BLD AUTO: 0 K/UL — SIGNIFICANT CHANGE UP (ref 0–0.5)
EOSINOPHIL NFR BLD AUTO: 0 % — SIGNIFICANT CHANGE UP (ref 0–6)
FLUAV AG NPH QL: SIGNIFICANT CHANGE UP
FLUBV AG NPH QL: SIGNIFICANT CHANGE UP
GAS PNL BLDV: SIGNIFICANT CHANGE UP
GLUCOSE SERPL-MCNC: 145 MG/DL — HIGH (ref 70–99)
GLUCOSE UR QL: NEGATIVE MG/DL — SIGNIFICANT CHANGE UP
HCT VFR BLD CALC: 33.8 % — LOW (ref 34.5–45)
HCT VFR BLD CALC: 39 % — SIGNIFICANT CHANGE UP (ref 34.5–45)
HGB BLD-MCNC: 10.1 G/DL — LOW (ref 11.5–15.5)
HGB BLD-MCNC: 11.6 G/DL — SIGNIFICANT CHANGE UP (ref 11.5–15.5)
INR BLD: 1.43 RATIO — HIGH (ref 0.85–1.16)
KETONES UR-MCNC: 15 MG/DL
LACTATE BLDV-MCNC: 1.6 MMOL/L — SIGNIFICANT CHANGE UP (ref 0.5–2)
LDH SERPL L TO P-CCNC: 328 U/L — HIGH (ref 50–242)
LEUKOCYTE ESTERASE UR-ACNC: NEGATIVE — SIGNIFICANT CHANGE UP
LYMPHOCYTES # BLD AUTO: 0 % — LOW (ref 13–44)
LYMPHOCYTES # BLD AUTO: 0 K/UL — LOW (ref 1–3.3)
MANUAL SMEAR VERIFICATION: SIGNIFICANT CHANGE UP
MCHC RBC-ENTMCNC: 16.9 PG — LOW (ref 27–34)
MCHC RBC-ENTMCNC: 17 PG — LOW (ref 27–34)
MCHC RBC-ENTMCNC: 29.7 G/DL — LOW (ref 32–36)
MCHC RBC-ENTMCNC: 29.9 G/DL — LOW (ref 32–36)
MCV RBC AUTO: 56.8 FL — LOW (ref 80–100)
MCV RBC AUTO: 56.9 FL — LOW (ref 80–100)
MICROCYTES BLD QL: SLIGHT — SIGNIFICANT CHANGE UP
MONOCYTES # BLD AUTO: 12.05 K/UL — HIGH (ref 0–0.9)
MONOCYTES NFR BLD AUTO: 18.4 % — HIGH (ref 2–14)
NEUTROPHILS # BLD AUTO: 53.44 K/UL — HIGH (ref 1.8–7.4)
NEUTROPHILS NFR BLD AUTO: 81.6 % — HIGH (ref 43–77)
NITRITE UR-MCNC: NEGATIVE — SIGNIFICANT CHANGE UP
NRBC BLD AUTO-RTO: 0 /100 WBCS — SIGNIFICANT CHANGE UP (ref 0–0)
OVALOCYTES BLD QL SMEAR: SLIGHT — SIGNIFICANT CHANGE UP
PH UR: 5 — SIGNIFICANT CHANGE UP (ref 5–8)
PLAT MORPH BLD: NORMAL — SIGNIFICANT CHANGE UP
PLATELET # BLD AUTO: 300 K/UL — SIGNIFICANT CHANGE UP (ref 150–400)
PLATELET # BLD AUTO: 420 K/UL — HIGH (ref 150–400)
POIKILOCYTOSIS BLD QL AUTO: SLIGHT — SIGNIFICANT CHANGE UP
POLYCHROMASIA BLD QL SMEAR: SLIGHT — SIGNIFICANT CHANGE UP
POTASSIUM SERPL-MCNC: 3.2 MMOL/L — LOW (ref 3.5–5.3)
POTASSIUM SERPL-SCNC: 3.2 MMOL/L — LOW (ref 3.5–5.3)
PROT SERPL-MCNC: 7.2 G/DL — SIGNIFICANT CHANGE UP (ref 6–8.3)
PROT UR-MCNC: 100 MG/DL
PROTHROM AB SERPL-ACNC: 16.4 SEC — HIGH (ref 9.9–13.4)
RBC # BLD: 5.95 M/UL — HIGH (ref 3.8–5.2)
RBC # BLD: 6.86 M/UL — HIGH (ref 3.8–5.2)
RBC # FLD: 21.2 % — HIGH (ref 10.3–14.5)
RBC # FLD: 22.2 % — HIGH (ref 10.3–14.5)
RBC BLD AUTO: ABNORMAL
RBC CASTS # UR COMP ASSIST: 2 /HPF — SIGNIFICANT CHANGE UP (ref 0–4)
REVIEW: SIGNIFICANT CHANGE UP
RSV RNA NPH QL NAA+NON-PROBE: SIGNIFICANT CHANGE UP
SARS-COV-2 RNA SPEC QL NAA+PROBE: SIGNIFICANT CHANGE UP
SCHISTOCYTES BLD QL AUTO: SLIGHT — SIGNIFICANT CHANGE UP
SODIUM SERPL-SCNC: 139 MMOL/L — SIGNIFICANT CHANGE UP (ref 135–145)
SOURCE RESPIRATORY: SIGNIFICANT CHANGE UP
SP GR SPEC: 1.02 — SIGNIFICANT CHANGE UP (ref 1–1.03)
SQUAMOUS # UR AUTO: 0 /HPF — SIGNIFICANT CHANGE UP (ref 0–5)
URATE SERPL-MCNC: 5.6 MG/DL — SIGNIFICANT CHANGE UP (ref 2.5–7)
UROBILINOGEN FLD QL: 1 MG/DL — SIGNIFICANT CHANGE UP (ref 0.2–1)
WBC # BLD: 45.57 K/UL — CRITICAL HIGH (ref 3.8–10.5)
WBC # BLD: 65.49 K/UL — CRITICAL HIGH (ref 3.8–10.5)
WBC # FLD AUTO: 45.57 K/UL — CRITICAL HIGH (ref 3.8–10.5)
WBC # FLD AUTO: 65.49 K/UL — CRITICAL HIGH (ref 3.8–10.5)
WBC UR QL: 0 /HPF — SIGNIFICANT CHANGE UP (ref 0–5)

## 2025-03-31 PROCEDURE — 93010 ELECTROCARDIOGRAM REPORT: CPT

## 2025-03-31 PROCEDURE — 99223 1ST HOSP IP/OBS HIGH 75: CPT

## 2025-03-31 PROCEDURE — 72131 CT LUMBAR SPINE W/O DYE: CPT | Mod: 26

## 2025-03-31 PROCEDURE — 99285 EMERGENCY DEPT VISIT HI MDM: CPT

## 2025-03-31 PROCEDURE — 71250 CT THORAX DX C-: CPT | Mod: 26

## 2025-03-31 PROCEDURE — 71045 X-RAY EXAM CHEST 1 VIEW: CPT | Mod: 26

## 2025-03-31 RX ORDER — ACETAMINOPHEN 500 MG/5ML
1000 LIQUID (ML) ORAL ONCE
Refills: 0 | Status: COMPLETED | OUTPATIENT
Start: 2025-03-31 | End: 2025-03-31

## 2025-03-31 RX ORDER — SODIUM CHLORIDE 9 G/1000ML
4200 INJECTION, SOLUTION INTRAVENOUS ONCE
Refills: 0 | Status: DISCONTINUED | OUTPATIENT
Start: 2025-03-31 | End: 2025-03-31

## 2025-03-31 RX ORDER — PIPERACILLIN-TAZO-DEXTROSE,ISO 3.375G/5
3.38 IV SOLUTION, PIGGYBACK PREMIX FROZEN(ML) INTRAVENOUS ONCE
Refills: 0 | Status: COMPLETED | OUTPATIENT
Start: 2025-03-31 | End: 2025-03-31

## 2025-03-31 RX ORDER — VANCOMYCIN HCL IN 5 % DEXTROSE 1.5G/250ML
1000 PLASTIC BAG, INJECTION (ML) INTRAVENOUS ONCE
Refills: 0 | Status: COMPLETED | OUTPATIENT
Start: 2025-03-31 | End: 2025-03-31

## 2025-03-31 RX ORDER — SODIUM CHLORIDE 9 G/1000ML
1000 INJECTION, SOLUTION INTRAVENOUS ONCE
Refills: 0 | Status: COMPLETED | OUTPATIENT
Start: 2025-03-31 | End: 2025-03-31

## 2025-03-31 RX ADMIN — Medication 200 GRAM(S): at 18:24

## 2025-03-31 RX ADMIN — SODIUM CHLORIDE 1000 MILLILITER(S): 9 INJECTION, SOLUTION INTRAVENOUS at 18:26

## 2025-03-31 RX ADMIN — Medication 1000 MILLILITER(S): at 21:03

## 2025-03-31 RX ADMIN — Medication 1000 MILLIGRAM(S): at 18:55

## 2025-03-31 RX ADMIN — Medication 400 MILLIGRAM(S): at 18:26

## 2025-03-31 RX ADMIN — Medication 100 MILLIEQUIVALENT(S): at 22:59

## 2025-03-31 RX ADMIN — Medication 250 MILLIGRAM(S): at 19:42

## 2025-03-31 NOTE — ED ADULT NURSE NOTE - ED STAT RN HANDOFF DETAILS
Report endorsed to chaka Casper RN. Safety checks completed this shift. Safety rounds completed hourly.  IV sites checked Q2+remains WDL. Medications administered as ordered with no signs/symptoms of adverse reactions. Fall & skin precautions in place. Any issues endorsed to oncoming RN for follow up.

## 2025-03-31 NOTE — H&P ADULT - HISTORY OF PRESENT ILLNESS
84y F PMH TAVR (2019), HTN, COPD (oxygen as needed), HLD, S1 fracture (no surgical intervention, 2/2025) presents to ED s/p fall at 7 PM yesterday.  No head strike or LOC.  Not on AC.  Fell yesterday while walking to bathroom b/c leg weakness with her "legs giving out on her".  Also endorse fever Tmax 101. Patient at baseline mental status (A&O x 3).     ROS: Denies HA, CP, SOB, palpitation, N/V/D, fever, cough, chills, dizziness, abm pain, sick contact, change in bowel or urinary habits   A 10-system ROS was performed and is negative except as noted above and/or in the HPI.

## 2025-03-31 NOTE — H&P ADULT - NSICDXPASTMEDICALHX_GEN_ALL_CORE_FT
PAST MEDICAL HISTORY:  COPD (chronic obstructive pulmonary disease)     HLD (hyperlipidemia)     HTN (hypertension)     S/P TAVR (transcatheter aortic valve replacement)

## 2025-03-31 NOTE — H&P ADULT - PROBLEM SELECTOR PLAN 1
- Febrile (at home) + tachycardic + Tachypneic + elevated wbc + possible pulm as infectious source meeting sepsis criteria   - EKG: sinus tach   - Lab: elevated wbc ,  Flu/RSV/COVID (-)   - CXR: left retrocardiac opacity   - ED: Vanco, Zosyn, IVF    - C/w IV zosyn, will add vanco as indicated pending MRSA swab  - c/w maintenance IVF  for sepsis fluid resuscitation  - Check expanded RVP, Ur Legionella, Ur Mycoplasma, S. Pneumo, H. flu  - F/u bcx, Ucx   - Trend labs, monitor clinically - Febrile (at home) + tachycardic + Tachypneic + elevated wbc + possible pulm as infectious source meeting sepsis criteria   - EKG: sinus tach   - Lab: elevated wbc ,  Flu/RSV/COVID (-)   - CXR: left retrocardiac opacity   - ED: Vanco, Zosyn, IVF    - C/w IV zosyn, will add vanco as indicated pending MRSA swab  - C/w maintenance IVF  for sepsis fluid resuscitation  - Check expanded RVP, Ur Legionella, Ur Mycoplasma, S. Pneumo, H. flu  - F/u Bcx, Ucx   - Trend labs, monitor clinically

## 2025-03-31 NOTE — H&P ADULT - PROBLEM SELECTOR PLAN 3
- No AC use, No head trauma, or LOC   - CXR: atraumatic   - PRN pain control   - PT consult   - Fall precaution

## 2025-03-31 NOTE — ED PROVIDER NOTE - OBJECTIVE STATEMENT
HR=76 bpm, DJZB=023/54 mmhg, SpO2=98.0 %, Resp=19 B/min, EtCO2=31 mmHg, Apnea=1 Seconds, Rodo=10, Comment=sinus rhythm 84-year-old female past medical history TAVR (2019), HTN, COPD (oxygen as needed), HLD, S1 fracture (no surgical intervention, 2/2025) presents to ED s/p fall at 7 PM yesterday.  No head strike or LOC.  Not on AC.  Daughter at bedside notes that patient complained of fall yesterday due to leg weakness with her "legs giving out on her".  Patient at baseline mental status (A&O x 3).  Denies fever, chills, nausea, vomiting, chest pain, SOB, abdominal pain, hematuria, dysuria, urinary urgency, change in bowel movement.  No recent travel or sick contacts.  Denies use of pain medication symptomatically.  No saddle anesthesia or urinary incontinence/retention.

## 2025-03-31 NOTE — H&P ADULT - ASSESSMENT
84y F PMH TAVR (2019), HTN, COPD (oxygen as needed), HLD, S1 fracture (no surgical intervention, 2/2025) presents to ED s/p fall found to be septic, a/f further eval

## 2025-03-31 NOTE — ED PROVIDER NOTE - CLINICAL SUMMARY MEDICAL DECISION MAKING FREE TEXT BOX
Febrile tachycardic female presents to ED s/p fall 7 PM.  Physical exam will for no signs of skull fracture.  No midline tenderness C/T/L.  Cranial and neurologically intact.  Normal EOM, PERRLA.  Clear breath sounds bilaterally satting well on RA. Soft nontender nontender abdomen.  Negative CVA.  5/5 strength bilateral lower extremity, neurovascular intact, soft compartments.  Ordered sepsis workup, CK, CT chest/lumbar to assess for PNA versus bacteremia versus viral URI versus infectious etiology versus worsening vertebral fracture.  Given fluids, empiric antibiotics, pain meds, reassess.

## 2025-03-31 NOTE — H&P ADULT - NSHPPHYSICALEXAM_GEN_ALL_CORE
T(C): 37.4 (04-01-25 @ 02:04), Max: 37.6 (03-31-25 @ 17:11)  HR: 118 (04-01-25 @ 02:04) (116 - 130)  BP: 140/63 (04-01-25 @ 02:04) (120/60 - 141/80)  RR: 19 (04-01-25 @ 02:04) (19 - 28)  SpO2: 97% (04-01-25 @ 02:04) (94% - 98%)    CONSTITUTIONAL: Well groomed, no apparent distress  EYES: PERRLA , EOMI  ENMT: MMM. Normal dentition  RESP: expiratory wheeze, 2L NC   CV: +S1S2, RRR, no peripheral edema  GI: Soft, NTND, no RGR  MSK: spontaneously moves all extremities   SKIN: No rashes or ulcers noted  NEURO:  No focal deficits, sensation intact throughout   PSYCH: A+O x 3

## 2025-03-31 NOTE — ED ADULT NURSE NOTE - OBJECTIVE STATEMENT
Pt presents to the ED BIBA A&Ox4, s/p fall at 7 PM yesterday.  TAVR (2019), HTN, COPD (oxygen as needed), HLD, S1 fracture (no surgical intervention, 2/2025).  Daughter at bedside notes that patient complained of fall yesterday due to leg weakness with her "legs giving out on her".  Patient at baseline mental status (A&O x 3).  Denies fever, chills, nausea, vomiting, chest pain, SOB, abdominal pain, hematuria, dysuria, urinary urgency, change in bowel movement.  No recent travel or sick contacts.  .  No saddle anesthesia or urinary incontinence/retention.Denies head strike or LOC,  AC. use. Upon arrival, pt expressive and co chronic back pain. Pt soiled and covered in feces. Pt noted to be tachycardiac and febrile. Will place on cardiac monitor. Sepsis Labs drawn and sent.

## 2025-03-31 NOTE — H&P ADULT - PROBLEM SELECTOR PLAN 2
- WBC 65K > 45K   - CT chest: LLL 2.4cm nodule c/f lung neoplasm   - Eval'd by heme-onc:  Suspect leukocytosis is leukemoid reaction I/S/O infection. Low suspicion for acute leukemia. Rec Obtain LDH, Uric acid, fibrinogen clauss, sepsis work up, abx, and malignancy work up with CT C/A/P w CTX   - LDH: 328 (elevated iso infection), Uric acid 5.6 ( wnl), Fibrinogen clauss pending   - F/u CT CAP w/ CTX  (ordered)   - Check expanded RVP(Lab called), Ur Legionella, Ur Mycoplasma, S. Pneumo, H. flu  - Trend labs

## 2025-03-31 NOTE — ED PROVIDER NOTE - ATTENDING CONTRIBUTION TO CARE
PMD Poli Billings pcp, [facility].   84y female past med history COPD, patient S1 fracture diagnosed recently after a fall, CAD, TAVR hypertension, comes to ER complaint of a fall last night while she was walking to the bathroom with back pain.  Patient states mildly more dyspneic than usual.  Patient states that she had fallen approximately 7 PM.  Patient had been contacted earlier today by her family and appeared well.  Today family is unable to reach her by phone neighbors eventually climbed into a window and found her on the floor.  Patient complaining of pain rating down the left leg with the leg giving out at onset.  Physical exam elderly female awake alert GCS 15 looking acutely ill dry mucous membranes.  HEENT normocephalic/atraumatic.  CV tachycardic  Chest clear A&P  Abdomen soft positive bowel sounds.  Neuro GCS 15 speech fluent moves all extremities probably straight leg raise left lower extremity 15 degrees.  Sensation plantarflexion intact.  Gregory العلي MD, Facep

## 2025-03-31 NOTE — H&P ADULT - NSHPLABSRESULTS_GEN_ALL_CORE
10.1   45.57 )-----------( 300      ( 31 Mar 2025 22:18 )             33.8       03-31    139  |  98  |  15  ----------------------------<  145[H]  3.2[L]   |  22  |  0.93    Ca    9.3      31 Mar 2025 17:57    TPro  7.2  /  Alb  4.0  /  TBili  1.3[H]  /  DBili  x   /  AST  56[H]  /  ALT  25  /  AlkPhos  87  03-31    Lactate, Blood: 1.3 mmol/L (03.31.25 @ 20:50)  Lactate Dehydrogenase, Serum: 328: U/L (03.31.25 @ 22:18)    Creatine Kinase: 952 U/L (03.31.25 @ 17:57)  Creatine Kinase: 639 U/L (03.31.25 @ 22:18)    Urinalysis + Microscopic Examination (03.31.25 @ 21:09)    pH Urine: 5.0    Urine Appearance: Cloudy    Color: Dark Yellow    Specific Gravity: 1.022    Protein, Urine: 100 mg/dL    Glucose Qualitative, Urine: Negative mg/dL    Ketone - Urine: 15 mg/dL    Blood, Urine: Moderate    Bilirubin: Negative    Urobilinogen: 1.0 mg/dL    Leukocyte Esterase Concentration: Negative    Nitrite: Negative    Review: Reviewed    White Blood Cell - Urine: 0 /HPF    Red Blood Cell - Urine: 2 /HPF    Bacteria: Negative /HPF    Cast: 4 /LPF    Epithelial Cells: 0 /HPF    FluA/FluB/RSV/COVID PCR (03.31.25 @ 17:52)    SARS-CoV-2 Result: NotDetec    Influenza A Result: NotDetec    Influenza B Result: NotDetec    Resp Syn Virus Result: NotDetec    Source Respiratory: Nasopharyngeal    - - - - - - - - - - - - - - - - - - - - - - - - - - - - - - - - - - - - - - - - - - - - - - - - - - - -       EKG PERSONALLY REVIEWED:  Sinus tach 132    IMAGES PERSONALLY REVIEWED:     < from: Xray Chest 1 View- PORTABLE-Urgent (03.31.25 @ 19:26) >  IMPRESSION:  Left retrocardiac nodular opacity, better appreciated in CT of the chest.   Follow-up dedicated CT chest report.    < from: CT Chest No Cont (03.31.25 @ 19:20) >  IMPRESSION:  Left lower lobe 2.4 cm nodule new since October 2, 2019 concerning for   neoplasm, possibly primary lung neoplasm.    Enlargement of the partially imaged spleen with interval increase in size   since 2019.    Oncology consultation is recommended.

## 2025-03-31 NOTE — ED PROVIDER NOTE - PHYSICAL EXAMINATION
Gen: NAD, non-toxic appearing  Head: normal appearing  HEENT: normal conjunctiva, oral mucosa dry, no signs of skull fracture.  No midline tenderness C/T/L.  Cranial and neurologically intact.  Normal EOM, PERRLA.  Lung: no respiratory distress, speaking in full sentences, CTA b/l     CV: regular rate and rhythm, no murmurs  Abd: soft, non distended, non tender   MSK: no visible deformities  Neuro: No focal deficits, AAOx3  Skin: Warm  Psych: normal affect

## 2025-04-01 DIAGNOSIS — R93.89 ABNORMAL FINDINGS ON DIAGNOSTIC IMAGING OF OTHER SPECIFIED BODY STRUCTURES: ICD-10-CM

## 2025-04-01 DIAGNOSIS — J44.9 CHRONIC OBSTRUCTIVE PULMONARY DISEASE, UNSPECIFIED: ICD-10-CM

## 2025-04-01 DIAGNOSIS — I10 ESSENTIAL (PRIMARY) HYPERTENSION: ICD-10-CM
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
## 2025-04-01 DIAGNOSIS — D72.829 ELEVATED WHITE BLOOD CELL COUNT, UNSPECIFIED: ICD-10-CM

## 2025-04-01 DIAGNOSIS — Z79.899 OTHER LONG TERM (CURRENT) DRUG THERAPY: ICD-10-CM

## 2025-04-01 DIAGNOSIS — R91.1 SOLITARY PULMONARY NODULE: ICD-10-CM

## 2025-04-01 DIAGNOSIS — A41.9 SEPSIS, UNSPECIFIED ORGANISM: ICD-10-CM

## 2025-04-01 LAB
ADD ON TEST-SPECIMEN IN LAB: SIGNIFICANT CHANGE UP
ANION GAP SERPL CALC-SCNC: 14 MMOL/L — SIGNIFICANT CHANGE UP (ref 5–17)
BUN SERPL-MCNC: 14 MG/DL — SIGNIFICANT CHANGE UP (ref 7–23)
CALCIUM SERPL-MCNC: 8.1 MG/DL — LOW (ref 8.4–10.5)
CHLORIDE SERPL-SCNC: 104 MMOL/L — SIGNIFICANT CHANGE UP (ref 96–108)
CO2 SERPL-SCNC: 20 MMOL/L — LOW (ref 22–31)
CREAT SERPL-MCNC: 0.83 MG/DL — SIGNIFICANT CHANGE UP (ref 0.5–1.3)
CULTURE RESULTS: SIGNIFICANT CHANGE UP
EGFR: 69 ML/MIN/1.73M2 — SIGNIFICANT CHANGE UP
EGFR: 69 ML/MIN/1.73M2 — SIGNIFICANT CHANGE UP
FIBRINOGEN PPP-MCNC: 596 MG/DL — HIGH (ref 200–445)
GLUCOSE SERPL-MCNC: 118 MG/DL — HIGH (ref 70–99)
HCT VFR BLD CALC: 31.4 % — LOW (ref 34.5–45)
HGB BLD-MCNC: 9.3 G/DL — LOW (ref 11.5–15.5)
INR BLD: 1.64 RATIO — HIGH (ref 0.85–1.16)
MCHC RBC-ENTMCNC: 16.9 PG — LOW (ref 27–34)
MCHC RBC-ENTMCNC: 29.6 G/DL — LOW (ref 32–36)
MCV RBC AUTO: 57.1 FL — LOW (ref 80–100)
NRBC BLD AUTO-RTO: 0 /100 WBCS — SIGNIFICANT CHANGE UP (ref 0–0)
PLATELET # BLD AUTO: 317 K/UL — SIGNIFICANT CHANGE UP (ref 150–400)
POTASSIUM SERPL-MCNC: 4 MMOL/L — SIGNIFICANT CHANGE UP (ref 3.5–5.3)
POTASSIUM SERPL-SCNC: 4 MMOL/L — SIGNIFICANT CHANGE UP (ref 3.5–5.3)
PROTHROM AB SERPL-ACNC: 18.8 SEC — HIGH (ref 9.9–13.4)
RAPID RVP RESULT: SIGNIFICANT CHANGE UP
RBC # BLD: 5.5 M/UL — HIGH (ref 3.8–5.2)
RBC # FLD: 20.9 % — HIGH (ref 10.3–14.5)
SARS-COV-2 RNA SPEC QL NAA+PROBE: SIGNIFICANT CHANGE UP
SODIUM SERPL-SCNC: 138 MMOL/L — SIGNIFICANT CHANGE UP (ref 135–145)
SPECIMEN SOURCE: SIGNIFICANT CHANGE UP
WBC # BLD: 39.81 K/UL — HIGH (ref 3.8–10.5)
WBC # FLD AUTO: 39.81 K/UL — HIGH (ref 3.8–10.5)

## 2025-04-01 PROCEDURE — G0452: CPT | Mod: 26

## 2025-04-01 PROCEDURE — 99233 SBSQ HOSP IP/OBS HIGH 50: CPT

## 2025-04-01 PROCEDURE — 99223 1ST HOSP IP/OBS HIGH 75: CPT | Mod: GC

## 2025-04-01 PROCEDURE — 88291 CYTO/MOLECULAR REPORT: CPT | Mod: 59

## 2025-04-01 PROCEDURE — 71260 CT THORAX DX C+: CPT | Mod: 26

## 2025-04-01 PROCEDURE — 74177 CT ABD & PELVIS W/CONTRAST: CPT | Mod: 26

## 2025-04-01 RX ORDER — PROMETHAZINE HYDROCHLORIDE 25 MG/ML
25 INJECTION INTRAMUSCULAR; INTRAVENOUS EVERY 6 HOURS
Refills: 0 | Status: DISCONTINUED | OUTPATIENT
Start: 2025-04-01 | End: 2025-04-01

## 2025-04-01 RX ORDER — TRAMADOL HYDROCHLORIDE 50 MG/1
50 TABLET, FILM COATED ORAL EVERY 8 HOURS
Refills: 0 | Status: DISCONTINUED | OUTPATIENT
Start: 2025-04-01 | End: 2025-04-04

## 2025-04-01 RX ORDER — MAGNESIUM, ALUMINUM HYDROXIDE 200-200 MG
30 TABLET,CHEWABLE ORAL EVERY 4 HOURS
Refills: 0 | Status: DISCONTINUED | OUTPATIENT
Start: 2025-04-01 | End: 2025-04-04

## 2025-04-01 RX ORDER — LOSARTAN POTASSIUM 100 MG/1
50 TABLET, FILM COATED ORAL DAILY
Refills: 0 | Status: DISCONTINUED | OUTPATIENT
Start: 2025-04-01 | End: 2025-04-04

## 2025-04-01 RX ORDER — ROSUVASTATIN CALCIUM 5 MG/1
40 TABLET, FILM COATED ORAL AT BEDTIME
Refills: 0 | Status: DISCONTINUED | OUTPATIENT
Start: 2025-04-01 | End: 2025-04-04

## 2025-04-01 RX ORDER — ENOXAPARIN SODIUM 100 MG/ML
40 INJECTION SUBCUTANEOUS EVERY 24 HOURS
Refills: 0 | Status: DISCONTINUED | OUTPATIENT
Start: 2025-04-01 | End: 2025-04-04

## 2025-04-01 RX ORDER — PROMETHAZINE HYDROCHLORIDE 25 MG/ML
25 INJECTION INTRAMUSCULAR; INTRAVENOUS EVERY 6 HOURS
Refills: 0 | Status: DISCONTINUED | OUTPATIENT
Start: 2025-04-01 | End: 2025-04-04

## 2025-04-01 RX ORDER — MELATONIN 5 MG
3 TABLET ORAL AT BEDTIME
Refills: 0 | Status: DISCONTINUED | OUTPATIENT
Start: 2025-04-01 | End: 2025-04-04

## 2025-04-01 RX ORDER — ACETAMINOPHEN 500 MG/5ML
650 LIQUID (ML) ORAL EVERY 6 HOURS
Refills: 0 | Status: DISCONTINUED | OUTPATIENT
Start: 2025-04-01 | End: 2025-04-04

## 2025-04-01 RX ORDER — GABAPENTIN 400 MG/1
100 CAPSULE ORAL
Refills: 0 | Status: DISCONTINUED | OUTPATIENT
Start: 2025-04-01 | End: 2025-04-04

## 2025-04-01 RX ORDER — ONDANSETRON HCL/PF 4 MG/2 ML
4 VIAL (ML) INJECTION EVERY 8 HOURS
Refills: 0 | Status: DISCONTINUED | OUTPATIENT
Start: 2025-04-01 | End: 2025-04-04

## 2025-04-01 RX ORDER — PIPERACILLIN-TAZO-DEXTROSE,ISO 3.375G/5
3.38 IV SOLUTION, PIGGYBACK PREMIX FROZEN(ML) INTRAVENOUS EVERY 8 HOURS
Refills: 0 | Status: DISCONTINUED | OUTPATIENT
Start: 2025-04-01 | End: 2025-04-04

## 2025-04-01 RX ORDER — IPRATROPIUM BROMIDE AND ALBUTEROL SULFATE .5; 2.5 MG/3ML; MG/3ML
3 SOLUTION RESPIRATORY (INHALATION) EVERY 6 HOURS
Refills: 0 | Status: DISCONTINUED | OUTPATIENT
Start: 2025-04-01 | End: 2025-04-04

## 2025-04-01 RX ADMIN — IPRATROPIUM BROMIDE AND ALBUTEROL SULFATE 3 MILLILITER(S): .5; 2.5 SOLUTION RESPIRATORY (INHALATION) at 18:25

## 2025-04-01 RX ADMIN — Medication 650 MILLIGRAM(S): at 06:12

## 2025-04-01 RX ADMIN — Medication 650 MILLIGRAM(S): at 06:31

## 2025-04-01 RX ADMIN — GABAPENTIN 100 MILLIGRAM(S): 400 CAPSULE ORAL at 18:26

## 2025-04-01 RX ADMIN — Medication 1 DOSE(S): at 21:21

## 2025-04-01 RX ADMIN — ROSUVASTATIN CALCIUM 40 MILLIGRAM(S): 5 TABLET, FILM COATED ORAL at 21:23

## 2025-04-01 RX ADMIN — LOSARTAN POTASSIUM 50 MILLIGRAM(S): 100 TABLET, FILM COATED ORAL at 06:12

## 2025-04-01 RX ADMIN — Medication 40 MILLIGRAM(S): at 06:12

## 2025-04-01 RX ADMIN — ENOXAPARIN SODIUM 40 MILLIGRAM(S): 100 INJECTION SUBCUTANEOUS at 06:11

## 2025-04-01 RX ADMIN — Medication 25 GRAM(S): at 06:12

## 2025-04-01 RX ADMIN — Medication 25 GRAM(S): at 13:59

## 2025-04-01 RX ADMIN — Medication 650 MILLIGRAM(S): at 13:02

## 2025-04-01 RX ADMIN — IPRATROPIUM BROMIDE AND ALBUTEROL SULFATE 3 MILLILITER(S): .5; 2.5 SOLUTION RESPIRATORY (INHALATION) at 12:00

## 2025-04-01 RX ADMIN — IPRATROPIUM BROMIDE AND ALBUTEROL SULFATE 3 MILLILITER(S): .5; 2.5 SOLUTION RESPIRATORY (INHALATION) at 06:12

## 2025-04-01 RX ADMIN — TRAMADOL HYDROCHLORIDE 50 MILLIGRAM(S): 50 TABLET, FILM COATED ORAL at 21:38

## 2025-04-01 RX ADMIN — IPRATROPIUM BROMIDE AND ALBUTEROL SULFATE 3 MILLILITER(S): .5; 2.5 SOLUTION RESPIRATORY (INHALATION) at 23:19

## 2025-04-01 RX ADMIN — TRAMADOL HYDROCHLORIDE 50 MILLIGRAM(S): 50 TABLET, FILM COATED ORAL at 21:23

## 2025-04-01 RX ADMIN — Medication 1 DOSE(S): at 08:43

## 2025-04-01 RX ADMIN — Medication 25 GRAM(S): at 21:22

## 2025-04-01 RX ADMIN — Medication 650 MILLIGRAM(S): at 13:32

## 2025-04-01 NOTE — DISCHARGE NOTE NURSING/CASE MANAGEMENT/SOCIAL WORK - FINANCIAL ASSISTANCE
[facility] provides services at a reduced cost to those who are determined to be eligible through [facility]’s financial assistance program. Information regarding [facility]’s financial assistance program can be found by going to [email or web address] or by calling [phone].

## 2025-04-01 NOTE — POST DISCHARGE NOTE - NOTIFICATION:
Notified Dr. Edie Farfan of patient's CT findings.  Place an order in [place] to "Consults- Cancer Care Direct Navigation" for assistance in outpatient care.

## 2025-04-01 NOTE — DISCHARGE NOTE NURSING/CASE MANAGEMENT/SOCIAL WORK - PATIENT PORTAL LINK FT
You can access the FollowMyHealth Patient Portal offered by [facility] by registering at the following website: [email or web address]. By joining [employer]’s FollowMyHealth portal, you will also be able to view your health information using other applications (apps) compatible with our system.

## 2025-04-01 NOTE — PHYSICAL THERAPY INITIAL EVALUATION ADULT - ACTIVE RANGE OF MOTION EXAMINATION, REHAB EVAL
limited by pain/bilateral upper extremity Active ROM was WFL (within functional limits)/bilateral  lower extremity Active ROM was WFL (within functional limits)

## 2025-04-01 NOTE — CONSULT NOTE ADULT - ASSESSMENT
84y F PMH TAVR (2019), HTN, COPD (oxygen as needed), HLD, S1 fracture (no surgical intervention, 2/2025) presented to ED s/p fall found to be septic and labs showing leukocytosis. Hematology consulted for leukocytosis.     84-year-old female past medical history TAVR (2019), HTN, COPD (oxygen as needed), HLD, S1 fracture (no surgical intervention, 2/2025) presents to ED s/p fall at 7 PM yesterday. She is found to be febrile, tachycardic, HDS. Patient at baseline mental status (A&O x 3). Labs notable for WBC 65.5 with preserved Hgb and Plt, left shift with elevated neutrophils. CT notable for new left lower lobe 2.4 cm nodule new since October 2, 2019 concerning for neoplasm, possibly primary lung neoplasm. Hematology asked my medicine regarding leukocytosis.     # Leukocytosis   #Lung nodule  - Admission CBC: WBC=65.49, hgb=11.6, MCV=56.9, jhq=033  - Peripheral blood smear reviewed (4/1/25) showing predominantly segmented neutrophils, few dysplastic appearing WBCs, no blast seen. Few tear drop cells and rare schistocytes see. Large platelets seen.     - SUY=437, Uric acid=5.6, fibrinogen pyoruz=619  - CT chest (3/31/25) showed left lower lobe 2.4 cm nodule new since October 2, 2019 concerning for neoplasm, possibly primary lung neoplasm. Enlargement of the partially imaged spleen with interval increase in size since 2019.  - Etiology of Leukocytosis is unclear at this time, may be a leukemoid reaction in setting of infection vs. a paraneoplastic leukemoid reaction which has been seen in solid tumors most commonly observed in non-small cell lung cancer. Lower suspicion for acute leukemia as patient's other cell lines are preserved, but will send a flow cytometry for a complete workup.      Recommend:  - Check peripheral blood flow cytometry (requisition form and blood tubes to be provided to RN)   - Please check hemoglobin electrophoresis, iron panel, ferritin  - Follow up CT C/A/P with contrast, pending results will determine optimal site for tissue biopsy for possible lung malignancy   - Agree with sepsis work up, antibiotics, and malignancy work up   - Hematology will follow    Patient seen and examined with Dr. Jocelynn Marie, PGY-5  Fellow Hematology/Oncology  pager [phone]  Available on TEAMS  After 5pm or on weekends please contact  to page on-call fellow 84y F PMH TAVR (2019), HTN, COPD (oxygen as needed), HLD, S1 fracture (no surgical intervention, 2/2025) presented to ED s/p fall found to be septic and labs showing leukocytosis. Hematology consulted for leukocytosis.     84-year-old female past medical history TAVR (2019), HTN, COPD (oxygen as needed), HLD, S1 fracture (no surgical intervention, 2/2025) presents to ED s/p fall at 7 PM yesterday. She is found to be febrile, tachycardic, HDS. Patient at baseline mental status (A&O x 3). Labs notable for WBC 65.5 with preserved Hgb and Plt, left shift with elevated neutrophils. CT notable for new left lower lobe 2.4 cm nodule new since October 2, 2019 concerning for neoplasm, possibly primary lung neoplasm. Hematology asked my medicine regarding leukocytosis.     # Leukocytosis   #Lung nodule  - Admission CBC: WBC=65.49, hgb=11.6, MCV=56.9, lqe=203  - Peripheral blood smear reviewed (4/1/25) showing predominantly segmented neutrophils, few dysplastic appearing WBCs, no blast seen. Few tear drop cells and rare schistocytes see. Large platelets seen.     - CPA=713, Uric acid=5.6, fibrinogen ehwniq=449  - CT chest (3/31/25) showed left lower lobe 2.4 cm nodule new since October 2, 2019 concerning for neoplasm, possibly primary lung neoplasm. Enlargement of the partially imaged spleen with interval increase in size since 2019.  - Etiology of Leukocytosis is unclear at this time, may be a leukemoid reaction in setting of infection vs. a paraneoplastic leukemoid reaction which has been seen in solid tumors most commonly observed in non-small cell lung cancer. Lower suspicion for acute leukemia as patient's other cell lines are preserved, but will send a flow cytometry for a complete workup.      Recommend:  - Check peripheral blood flow cytometry (requisition form and blood tubes provided to RN)   - Please check hemoglobin electrophoresis, iron panel, ferritin  - Follow up CT C/A/P with contrast, pending results will determine optimal site for tissue biopsy for possible lung malignancy   - Agree with sepsis work up, antibiotics, and malignancy work up   - Hematology will follow    Patient seen and examined with Dr. Jocelynn Marie, PGY-5  Fellow Hematology/Oncology  pager [phone]  Available on TEAMS  After 5pm or on weekends please contact  to page on-call fellow 84-year-old female past medical history TAVR (2019), HTN, COPD (oxygen as needed), HLD, S1 fracture (no surgical intervention, 2/2025) presents to ED s/p fall at 7 PM yesterday. She is found to be febrile, tachycardic, HDS. Patient at baseline mental status (A&O x 3). Labs notable for WBC 65.5 with preserved Hgb and Plt, left shift with elevated neutrophils. CT notable for new left lower lobe 2.4 cm nodule new since October 2, 2019 concerning for neoplasm, possibly primary lung neoplasm. Hematology asked my medicine regarding leukocytosis.     # Leukocytosis   #Lung nodule  - Admission CBC: WBC=65.49, hgb=11.6, MCV=56.9, xhy=194  - Peripheral blood smear reviewed (4/1/25) showing predominantly segmented neutrophils, few dysplastic appearing WBCs, no blast seen. Few tear drop cells and rare schistocytes see. Large platelets seen.     - DQR=413, Uric acid=5.6, fibrinogen igwqab=513  - CT chest (3/31/25) showed left lower lobe 2.4 cm nodule new since October 2, 2019 concerning for neoplasm, possibly primary lung neoplasm. Enlargement of the partially imaged spleen with interval increase in size since 2019.  - Etiology of Leukocytosis is unclear at this time, may be a leukemoid reaction in setting of infection vs. a paraneoplastic leukemoid reaction which has been seen in solid tumors most commonly observed in non-small cell lung cancer. Lower suspicion for acute leukemia as patient's other cell lines are preserved, but will send a flow cytometry for a complete workup.      Recommend:  - Check peripheral blood flow cytometry (requisition form and blood tubes provided to RN)   - Please check hemoglobin electrophoresis, iron panel, ferritin  - Follow up CT C/A/P with contrast, pending results will determine optimal site for tissue biopsy for possible lung malignancy   - Agree with sepsis work up, antibiotics, and malignancy work up   - Hematology will follow    Patient seen and examined with Dr. Jocelynn Marie, PGY-5  Fellow Hematology/Oncology  pager [phone]  Available on TEAMS  After 5pm or on weekends please contact  to page on-call fellow

## 2025-04-01 NOTE — DISCHARGE NOTE NURSING/CASE MANAGEMENT/SOCIAL WORK - NSDCVIVACCINE_GEN_ALL_CORE_FT
Tdap; 29-Nov-2020 19:39; Stefanie Tobar (MAGNOLIA); [employer]; [id number] (Exp. Date: 31-Jul-2022); IntraMuscular; Dorsogluteal Right.; 0.5 milliLiter(s); VIS (VIS Published: 09-May-2013, VIS Presented: 29-Nov-2020);

## 2025-04-01 NOTE — PHYSICAL THERAPY INITIAL EVALUATION ADULT - PLANNED THERAPY INTERVENTIONS, PT EVAL
GOAL: Pt will negotiate up/down 3 steps with handrail independently in 4 weeks./bed mobility training/gait training/transfer training

## 2025-04-01 NOTE — PHYSICAL THERAPY INITIAL EVALUATION ADULT - GENERAL OBSERVATIONS, REHAB EVAL
PT received supine in bed, +IVL, +O2 via NC, c/o low back and radiating LLE pain, daughter at bedside.

## 2025-04-01 NOTE — PHYSICAL THERAPY INITIAL EVALUATION ADULT - ADDITIONAL COMMENTS
Per daughter, pt lives alone in an apartment with 3 steps to enter with railing.  Prior to recent S1 fracture, pt was independent with all functional mobility with RW.  Since S1 fracture mobility became more difficult due to pain.

## 2025-04-01 NOTE — DISCHARGE NOTE NURSING/CASE MANAGEMENT/SOCIAL WORK - NSDCPEFALRISK_GEN_ALL_CORE
For information on Fall & Injury Prevention, visit: [email or web address] OR  [email or web address] OR  https://www.cdc.gov/steadi/patient.html

## 2025-04-02 LAB
ALBUMIN SERPL ELPH-MCNC: 3 G/DL — LOW (ref 3.3–5)
ALP SERPL-CCNC: 88 U/L — SIGNIFICANT CHANGE UP (ref 40–120)
ALT FLD-CCNC: 20 U/L — SIGNIFICANT CHANGE UP (ref 10–45)
ANION GAP SERPL CALC-SCNC: 13 MMOL/L — SIGNIFICANT CHANGE UP (ref 5–17)
AST SERPL-CCNC: 27 U/L — SIGNIFICANT CHANGE UP (ref 10–40)
BILIRUB SERPL-MCNC: 0.5 MG/DL — SIGNIFICANT CHANGE UP (ref 0.2–1.2)
BUN SERPL-MCNC: 14 MG/DL — SIGNIFICANT CHANGE UP (ref 7–23)
CALCIUM SERPL-MCNC: 8.3 MG/DL — LOW (ref 8.4–10.5)
CHLORIDE SERPL-SCNC: 101 MMOL/L — SIGNIFICANT CHANGE UP (ref 96–108)
CO2 SERPL-SCNC: 24 MMOL/L — SIGNIFICANT CHANGE UP (ref 22–31)
CREAT SERPL-MCNC: 0.83 MG/DL — SIGNIFICANT CHANGE UP (ref 0.5–1.3)
EGFR: 69 ML/MIN/1.73M2 — SIGNIFICANT CHANGE UP
EGFR: 69 ML/MIN/1.73M2 — SIGNIFICANT CHANGE UP
FERRITIN SERPL-MCNC: 260 NG/ML — SIGNIFICANT CHANGE UP (ref 13–330)
GLUCOSE SERPL-MCNC: 115 MG/DL — HIGH (ref 70–99)
HCT VFR BLD CALC: 29.7 % — LOW (ref 34.5–45)
HGB BLD-MCNC: 8.8 G/DL — LOW (ref 11.5–15.5)
IRON SATN MFR SERPL: 14 UG/DL — LOW (ref 30–160)
IRON SATN MFR SERPL: 7 % — LOW (ref 14–50)
MCHC RBC-ENTMCNC: 16.9 PG — LOW (ref 27–34)
MCHC RBC-ENTMCNC: 29.6 G/DL — LOW (ref 32–36)
MCV RBC AUTO: 56.9 FL — LOW (ref 80–100)
MRSA PCR RESULT.: SIGNIFICANT CHANGE UP
NRBC BLD AUTO-RTO: 0 /100 WBCS — SIGNIFICANT CHANGE UP (ref 0–0)
PLATELET # BLD AUTO: 319 K/UL — SIGNIFICANT CHANGE UP (ref 150–400)
POTASSIUM SERPL-MCNC: 3.7 MMOL/L — SIGNIFICANT CHANGE UP (ref 3.5–5.3)
POTASSIUM SERPL-SCNC: 3.7 MMOL/L — SIGNIFICANT CHANGE UP (ref 3.5–5.3)
PROT SERPL-MCNC: 6 G/DL — SIGNIFICANT CHANGE UP (ref 6–8.3)
RBC # BLD: 5.22 M/UL — HIGH (ref 3.8–5.2)
RBC # FLD: 21.2 % — HIGH (ref 10.3–14.5)
S AUREUS DNA NOSE QL NAA+PROBE: SIGNIFICANT CHANGE UP
SODIUM SERPL-SCNC: 138 MMOL/L — SIGNIFICANT CHANGE UP (ref 135–145)
TIBC SERPL-MCNC: 195 UG/DL — LOW (ref 220–430)
UIBC SERPL-MCNC: 181 UG/DL — SIGNIFICANT CHANGE UP (ref 110–370)
WBC # BLD: 22.07 K/UL — HIGH (ref 3.8–10.5)
WBC # FLD AUTO: 22.07 K/UL — HIGH (ref 3.8–10.5)

## 2025-04-02 PROCEDURE — 88189 FLOWCYTOMETRY/READ 16 & >: CPT

## 2025-04-02 PROCEDURE — G0545: CPT

## 2025-04-02 PROCEDURE — 83020 HEMOGLOBIN ELECTROPHORESIS: CPT | Mod: 26

## 2025-04-02 PROCEDURE — 99223 1ST HOSP IP/OBS HIGH 75: CPT

## 2025-04-02 PROCEDURE — 99232 SBSQ HOSP IP/OBS MODERATE 35: CPT

## 2025-04-02 RX ORDER — ACETAMINOPHEN 500 MG/5ML
1000 LIQUID (ML) ORAL ONCE
Refills: 0 | Status: COMPLETED | OUTPATIENT
Start: 2025-04-02 | End: 2025-04-02

## 2025-04-02 RX ORDER — LIDOCAINE HYDROCHLORIDE 20 MG/ML
1 JELLY TOPICAL DAILY
Refills: 0 | Status: DISCONTINUED | OUTPATIENT
Start: 2025-04-02 | End: 2025-04-04

## 2025-04-02 RX ADMIN — Medication 25 GRAM(S): at 13:59

## 2025-04-02 RX ADMIN — Medication 400 MILLIGRAM(S): at 11:28

## 2025-04-02 RX ADMIN — IPRATROPIUM BROMIDE AND ALBUTEROL SULFATE 3 MILLILITER(S): .5; 2.5 SOLUTION RESPIRATORY (INHALATION) at 17:43

## 2025-04-02 RX ADMIN — Medication 1 DOSE(S): at 22:06

## 2025-04-02 RX ADMIN — IPRATROPIUM BROMIDE AND ALBUTEROL SULFATE 3 MILLILITER(S): .5; 2.5 SOLUTION RESPIRATORY (INHALATION) at 23:54

## 2025-04-02 RX ADMIN — ROSUVASTATIN CALCIUM 40 MILLIGRAM(S): 5 TABLET, FILM COATED ORAL at 22:06

## 2025-04-02 RX ADMIN — Medication 25 GRAM(S): at 22:08

## 2025-04-02 RX ADMIN — Medication 25 GRAM(S): at 05:55

## 2025-04-02 RX ADMIN — TRAMADOL HYDROCHLORIDE 50 MILLIGRAM(S): 50 TABLET, FILM COATED ORAL at 06:07

## 2025-04-02 RX ADMIN — GABAPENTIN 100 MILLIGRAM(S): 400 CAPSULE ORAL at 05:55

## 2025-04-02 RX ADMIN — IPRATROPIUM BROMIDE AND ALBUTEROL SULFATE 3 MILLILITER(S): .5; 2.5 SOLUTION RESPIRATORY (INHALATION) at 11:29

## 2025-04-02 RX ADMIN — Medication 1000 MILLIGRAM(S): at 12:49

## 2025-04-02 RX ADMIN — ENOXAPARIN SODIUM 40 MILLIGRAM(S): 100 INJECTION SUBCUTANEOUS at 05:56

## 2025-04-02 RX ADMIN — LIDOCAINE HYDROCHLORIDE 1 PATCH: 20 JELLY TOPICAL at 22:31

## 2025-04-02 RX ADMIN — IPRATROPIUM BROMIDE AND ALBUTEROL SULFATE 3 MILLILITER(S): .5; 2.5 SOLUTION RESPIRATORY (INHALATION) at 05:55

## 2025-04-02 RX ADMIN — TRAMADOL HYDROCHLORIDE 50 MILLIGRAM(S): 50 TABLET, FILM COATED ORAL at 23:54

## 2025-04-02 RX ADMIN — LOSARTAN POTASSIUM 50 MILLIGRAM(S): 100 TABLET, FILM COATED ORAL at 05:55

## 2025-04-02 RX ADMIN — LIDOCAINE HYDROCHLORIDE 1 PATCH: 20 JELLY TOPICAL at 19:30

## 2025-04-02 RX ADMIN — Medication 40 MILLIGRAM(S): at 06:07

## 2025-04-02 RX ADMIN — TRAMADOL HYDROCHLORIDE 50 MILLIGRAM(S): 50 TABLET, FILM COATED ORAL at 05:55

## 2025-04-02 RX ADMIN — LIDOCAINE HYDROCHLORIDE 1 PATCH: 20 JELLY TOPICAL at 11:28

## 2025-04-02 RX ADMIN — GABAPENTIN 100 MILLIGRAM(S): 400 CAPSULE ORAL at 17:43

## 2025-04-02 RX ADMIN — TRAMADOL HYDROCHLORIDE 50 MILLIGRAM(S): 50 TABLET, FILM COATED ORAL at 22:49

## 2025-04-02 RX ADMIN — Medication 1 DOSE(S): at 10:03

## 2025-04-02 NOTE — CONSULT NOTE ADULT - ASSESSMENT
84y F PMH TAVR (2019), HTN, COPD (oxygen as needed), HLD, S1 fracture (no surgical intervention, 2/2025) presents to ED s/p fall at 7 PM yesterday.        Overall leucocytosis, tachycardia, SIRS/sepsis on presentation.   No obvious source of infection at this time based on cx and imaging      PLAN:   Trend WBC, leucocytosis downtrending, c/w zosyn for now   need to r/o malignancy   need comparison and baseline CBC, discussed with daughter, will need records.   f/u blood cx, NTD  check quantiferon, will send fungal and viral serology   if plan for biopsy please send it for flow cytometry and AFB, bacterial and fungal cx.       Plan discussed with Medicine Attending.       Angy Bui  Please contact through MS Teams   If no response or past 5 pm/weekend call [phone].

## 2025-04-03 LAB
ANION GAP SERPL CALC-SCNC: 13 MMOL/L — SIGNIFICANT CHANGE UP (ref 5–17)
BUN SERPL-MCNC: 13 MG/DL — SIGNIFICANT CHANGE UP (ref 7–23)
CALCIUM SERPL-MCNC: 8.3 MG/DL — LOW (ref 8.4–10.5)
CHLORIDE SERPL-SCNC: 101 MMOL/L — SIGNIFICANT CHANGE UP (ref 96–108)
CMV IGG FLD QL: <0.2 U/ML — SIGNIFICANT CHANGE UP
CMV IGG SERPL-IMP: NEGATIVE — SIGNIFICANT CHANGE UP
CMV IGM FLD-ACNC: <8 AU/ML — SIGNIFICANT CHANGE UP
CMV IGM SERPL QL: NEGATIVE — SIGNIFICANT CHANGE UP
CO2 SERPL-SCNC: 24 MMOL/L — SIGNIFICANT CHANGE UP (ref 22–31)
CREAT SERPL-MCNC: 0.82 MG/DL — SIGNIFICANT CHANGE UP (ref 0.5–1.3)
CRYPTOC AG FLD QL: NEGATIVE — SIGNIFICANT CHANGE UP
EBV EA AB SER IA-ACNC: 17.4 U/ML — HIGH
EBV EA AB TITR SER IF: POSITIVE
EBV EA IGG SER-ACNC: POSITIVE
EBV NA IGG SER IA-ACNC: 99.4 U/ML — HIGH
EBV PATRN SPEC IB-IMP: SIGNIFICANT CHANGE UP
EBV VCA IGG AVIDITY SER QL IA: POSITIVE
EBV VCA IGM SER IA-ACNC: 52.3 U/ML — HIGH
EBV VCA IGM SER IA-ACNC: 668 U/ML — HIGH
EBV VCA IGM TITR FLD: POSITIVE
EGFR: 70 ML/MIN/1.73M2 — SIGNIFICANT CHANGE UP
EGFR: 70 ML/MIN/1.73M2 — SIGNIFICANT CHANGE UP
FIBRINOGEN AG PPP IA-MCNC: 523 MG/DL — HIGH (ref 233–496)
GLUCOSE SERPL-MCNC: 97 MG/DL — SIGNIFICANT CHANGE UP (ref 70–99)
HCT VFR BLD CALC: 28.5 % — LOW (ref 34.5–45)
HGB BLD-MCNC: 8.5 G/DL — LOW (ref 11.5–15.5)
MCHC RBC-ENTMCNC: 16.7 PG — LOW (ref 27–34)
MCHC RBC-ENTMCNC: 29.8 G/DL — LOW (ref 32–36)
MCV RBC AUTO: 55.9 FL — LOW (ref 80–100)
NRBC BLD AUTO-RTO: 0 /100 WBCS — SIGNIFICANT CHANGE UP (ref 0–0)
PLATELET # BLD AUTO: 375 K/UL — SIGNIFICANT CHANGE UP (ref 150–400)
POTASSIUM SERPL-MCNC: 3.8 MMOL/L — SIGNIFICANT CHANGE UP (ref 3.5–5.3)
POTASSIUM SERPL-SCNC: 3.8 MMOL/L — SIGNIFICANT CHANGE UP (ref 3.5–5.3)
RBC # BLD: 5.1 M/UL — SIGNIFICANT CHANGE UP (ref 3.8–5.2)
RBC # FLD: 20.4 % — HIGH (ref 10.3–14.5)
SODIUM SERPL-SCNC: 138 MMOL/L — SIGNIFICANT CHANGE UP (ref 135–145)
WBC # BLD: 15.93 K/UL — HIGH (ref 3.8–10.5)
WBC # FLD AUTO: 15.93 K/UL — HIGH (ref 3.8–10.5)

## 2025-04-03 PROCEDURE — G0545: CPT

## 2025-04-03 PROCEDURE — 99232 SBSQ HOSP IP/OBS MODERATE 35: CPT

## 2025-04-03 PROCEDURE — 73110 X-RAY EXAM OF WRIST: CPT | Mod: 26,LT

## 2025-04-03 PROCEDURE — 99223 1ST HOSP IP/OBS HIGH 75: CPT | Mod: GC

## 2025-04-03 RX ORDER — POLYETHYLENE GLYCOL 3350 17 G/17G
17 POWDER, FOR SOLUTION ORAL DAILY
Refills: 0 | Status: DISCONTINUED | OUTPATIENT
Start: 2025-04-03 | End: 2025-04-04

## 2025-04-03 RX ORDER — SENNA 187 MG
2 TABLET ORAL AT BEDTIME
Refills: 0 | Status: DISCONTINUED | OUTPATIENT
Start: 2025-04-03 | End: 2025-04-04

## 2025-04-03 RX ORDER — DEXTROMETHORPHAN HBR, GUAIFENESIN 200 MG/10ML
100 LIQUID ORAL EVERY 6 HOURS
Refills: 0 | Status: DISCONTINUED | OUTPATIENT
Start: 2025-04-03 | End: 2025-04-04

## 2025-04-03 RX ADMIN — GABAPENTIN 100 MILLIGRAM(S): 400 CAPSULE ORAL at 06:19

## 2025-04-03 RX ADMIN — Medication 40 MILLIGRAM(S): at 06:19

## 2025-04-03 RX ADMIN — Medication 2 TABLET(S): at 21:43

## 2025-04-03 RX ADMIN — IPRATROPIUM BROMIDE AND ALBUTEROL SULFATE 3 MILLILITER(S): .5; 2.5 SOLUTION RESPIRATORY (INHALATION) at 12:33

## 2025-04-03 RX ADMIN — LIDOCAINE HYDROCHLORIDE 1 PATCH: 20 JELLY TOPICAL at 12:34

## 2025-04-03 RX ADMIN — Medication 1 DOSE(S): at 09:25

## 2025-04-03 RX ADMIN — Medication 25 GRAM(S): at 21:44

## 2025-04-03 RX ADMIN — POLYETHYLENE GLYCOL 3350 17 GRAM(S): 17 POWDER, FOR SOLUTION ORAL at 12:38

## 2025-04-03 RX ADMIN — ENOXAPARIN SODIUM 40 MILLIGRAM(S): 100 INJECTION SUBCUTANEOUS at 06:19

## 2025-04-03 RX ADMIN — Medication 25 GRAM(S): at 06:19

## 2025-04-03 RX ADMIN — ROSUVASTATIN CALCIUM 40 MILLIGRAM(S): 5 TABLET, FILM COATED ORAL at 21:44

## 2025-04-03 RX ADMIN — GABAPENTIN 100 MILLIGRAM(S): 400 CAPSULE ORAL at 18:30

## 2025-04-03 RX ADMIN — Medication 25 GRAM(S): at 15:05

## 2025-04-03 RX ADMIN — LIDOCAINE HYDROCHLORIDE 1 PATCH: 20 JELLY TOPICAL at 19:30

## 2025-04-03 RX ADMIN — IPRATROPIUM BROMIDE AND ALBUTEROL SULFATE 3 MILLILITER(S): .5; 2.5 SOLUTION RESPIRATORY (INHALATION) at 06:19

## 2025-04-03 RX ADMIN — Medication 1 DOSE(S): at 21:44

## 2025-04-03 RX ADMIN — LOSARTAN POTASSIUM 50 MILLIGRAM(S): 100 TABLET, FILM COATED ORAL at 06:19

## 2025-04-03 NOTE — CONSULT NOTE ADULT - ASSESSMENT
84F w/ PMH of TAVR in 2019, COPD w/ PRN O2, HTN presenting on 3/31 after a fall the night prior.  Found to be febrile to 101 but no subjective sick symptoms. Also found to have leukocytosis, tachycardia. Admitted for sepsis work up.  On imaging found to have a new LLL mass-like consolidation.    # LLL nodule  Patient reportedly w/ Dr Mcdonnell as an outpatient for a LLL nodule. Prior imaging not available at this time but appears it has grown in size. Also has a L adrenal nodule and some periarotic LAD.  Findings concerning for malignancy given reported progression in size.  Discussed findings with interventional pulmonology. Patient would require robotic navigational bronchoscopy given location of the nodule.  From a risk-benefit standpoint, it would be safer for the patient to undergo procedure along with general anesthesia once she ahs recovered from acute septic episode.  Would favor outpatient follow up once recovered. Will attempt to reach out to outpatient pulmonologist to see whether procedure should be done by us vs through him. pt seen and re-evaluated vy Ed attending d/c ready in stable condition , left Ed [id number] in no acute dsitress

## 2025-04-03 NOTE — PROGRESS NOTE ADULT - ASSESSMENT
84y F PMH TAVR (2019), HTN, COPD (oxygen as needed), HLD, S1 fracture (no surgical intervention, 2/2025) presents to ED s/p fall at 7 PM yesterday.        Overall leucocytosis, tachycardia, SIRS/sepsis on presentation.   No obvious source of infection at this time based on cx and imaging      PLAN:   Trend WBC, leucocytosis downtrending, c/w zosyn for now   plan for empiric 7 day course  based on pt's blood work at [facility] in 6/2024 her WBC was 20.   need to r/o malignancy   f/u blood cx, NTD  quantiferon, fungal and viral serology pending  if plan for biopsy please send it for flow cytometry and AFB, bacterial and fungal cx.       Angy Bui  Please contact through MS Teams   If no response or past 5 pm/weekend call [phone].

## 2025-04-03 NOTE — CONSULT NOTE ADULT - SUBJECTIVE AND OBJECTIVE BOX
CHIEF COMPLAINT:    HPI:    84F w/ PMH of TAVR in 2019, COPD w/ PRN O2, HTN presenting on 3/31 after a fall the night prior.  Found to be febrile to 101 but no subjective sick symptoms. Also found to have leukocytosis, tachycardia. Admitted for sepsis work up.  On imaging found to have a LLL nodule. Pulmonary consulted for evaluation.    PAST MEDICAL & SURGICAL HISTORY:  HTN (hypertension)      COPD (chronic obstructive pulmonary disease)      HLD (hyperlipidemia)      S/P TAVR (transcatheter aortic valve replacement)          FAMILY HISTORY:      SOCIAL HISTORY:  Smoking: [ ] Never Smoked [ ] Former Smoker (__ packs x ___ years) [ ] Current Smoker  (__ packs x ___ years)  Substance Use: [ ] Never Used [ ] Used ____  EtOH Use:  Marital Status: [ ] Single [ ]  [ ]  [ ]   Sexual History:   Occupation:  Recent Travel:  Country of Birth:  Advance Directives:    Allergies    tetracycline (Rash; Urticaria)    Intolerances        HOME MEDICATIONS:  Home Medications:  albuterol 1.25 mg/3 mL (0.042%) inhalation solution: 3 milliliter(s) by nebulizer 2 times a day (01 Apr 2025 02:53)  Breztri Aerosphere 160 mcg-9 mcg-4.8 mcg/inh inhalation aerosol: 2 inhaled once a day (01 Apr 2025 02:53)  losartan 50 mg oral tablet: 1 tab(s) orally once a day (01 Apr 2025 02:53)  omeprazole 40 mg oral delayed release capsule: 1 cap(s) orally once a day (01 Apr 2025 02:53)  Spiriva 18 mcg inhalation capsule: 1 cap(s) inhaled once a day (01 Apr 2025 02:53)      REVIEW OF SYSTEMS:  [ x ] All other systems negative  [ ] Unable to assess ROS because ________    OBJECTIVE:  ICU Vital Signs Last 24 Hrs  T(C): 36.9 (03 Apr 2025 12:34), Max: 36.9 (02 Apr 2025 20:54)  T(F): 98.4 (03 Apr 2025 12:34), Max: 98.5 (02 Apr 2025 20:54)  HR: 90 (03 Apr 2025 12:34) (90 - 102)  BP: 126/67 (03 Apr 2025 12:34) (115/65 - 135/53)  BP(mean): --  ABP: --  ABP(mean): --  RR: 18 (03 Apr 2025 12:34) (18 - 18)  SpO2: 97% (03 Apr 2025 12:34) (94% - 98%)    O2 Parameters below as of 03 Apr 2025 12:34  Patient On (Oxygen Delivery Method): nasal cannula  O2 Flow (L/min): 3            04-02 @ 07:01  -  04-03 @ 07:00  --------------------------------------------------------  IN: 480 mL / OUT: 900 mL / NET: -420 mL      CAPILLARY BLOOD GLUCOSE          PHYSICAL EXAM:  General: NAD  HEENT: Normal sclera  Lymph Nodes: No LAD  Respiratory: CTABL  Cardiovascular: Regular rate and rhythm no m/r/g  Abdomen: Nontender nondistended  Extremities: No peripheral edema  Skin: No rashes  Neurological: No appreciable neurologic deficits  Psychiatry: Appropriate mood and affect    LINES:     HOSPITAL MEDICATIONS:  Standing Meds:  albuterol/ipratropium for Nebulization 3 milliLiter(s) Nebulizer every 6 hours  enoxaparin Injectable 40 milliGRAM(s) SubCutaneous every 24 hours  fluticasone propionate/ salmeterol 250-50 MICROgram(s) Diskus 1 Dose(s) Inhalation two times a day  gabapentin 100 milliGRAM(s) Oral two times a day  lidocaine   4% Patch 1 Patch Transdermal daily  losartan 50 milliGRAM(s) Oral daily  pantoprazole    Tablet 40 milliGRAM(s) Oral before breakfast  piperacillin/tazobactam IVPB.. 3.375 Gram(s) IV Intermittent every 8 hours  polyethylene glycol 3350 17 Gram(s) Oral daily  rosuvastatin 40 milliGRAM(s) Oral at bedtime  senna 2 Tablet(s) Oral at bedtime  sodium chloride 0.9%. 1000 milliLiter(s) IV Continuous <Continuous>      PRN Meds:  acetaminophen     Tablet .. 650 milliGRAM(s) Oral every 6 hours PRN  aluminum hydroxide/magnesium hydroxide/simethicone Suspension 30 milliLiter(s) Oral every 4 hours PRN  guaiFENesin Oral Liquid (Sugar-Free) 100 milliGRAM(s) Oral every 6 hours PRN  melatonin 3 milliGRAM(s) Oral at bedtime PRN  ondansetron Injectable 4 milliGRAM(s) IV Push every 8 hours PRN  promethazine 25 milliGRAM(s) Oral every 6 hours PRN  traMADol 50 milliGRAM(s) Oral every 8 hours PRN      LABS:                        8.5    15.93 )-----------( 375      ( 03 Apr 2025 06:46 )             28.5     Hgb Trend: 8.5<--, 8.8<--, 9.3<--, 10.1<--, 11.6<--  04-03    138  |  101  |  13  ----------------------------<  97  3.8   |  24  |  0.82    Ca    8.3[L]      03 Apr 2025 06:44    TPro  6.0  /  Alb  3.0[L]  /  TBili  0.5  /  DBili  x   /  AST  27  /  ALT  20  /  AlkPhos  88  04-02    Creatinine Trend: 0.82<--, 0.83<--, 0.83<--, 0.93<--    Urinalysis Basic - ( 03 Apr 2025 06:44 )    Color: x / Appearance: x / SG: x / pH: x  Gluc: 97 mg/dL / Ketone: x  / Bili: x / Urobili: x   Blood: x / Protein: x / Nitrite: x   Leuk Esterase: x / RBC: x / WBC x   Sq Epi: x / Non Sq Epi: x / Bacteria: x            MICROBIOLOGY:     Culture - Urine (collected 31 Mar 2025 21:09)  Source: Clean Catch Clean Catch (Midstream)  Final Report (01 Apr 2025 23:20):    <10,000 CFU/mL Normal Urogenital Meg    Culture - Blood (collected 31 Mar 2025 17:45)  Source: Blood Blood-Peripheral  Preliminary Report (02 Apr 2025 23:09):    No growth at 48 Hours    Culture - Blood (collected 31 Mar 2025 17:40)  Source: Blood Blood-Peripheral  Preliminary Report (02 Apr 2025 23:09):    No growth at 48 Hours        RADIOLOGY:  [ x ] Reviewed and interpreted by me    PULMONARY FUNCTION TESTS:    EKG:
Patient is a 84y old  Female who presents with a chief complaint of fall (02 Apr 2025 13:20)      HPI:  84y F PMH TAVR (2019), HTN, COPD (oxygen as needed), HLD, S1 fracture (no surgical intervention, 2/2025) presents to ED s/p fall at 7 PM yesterday.  No head strike or LOC.  Not on AC.  Fell yesterday while walking to bathroom b/c leg weakness with her "legs giving out on her".  Also endorse fever Tmax 101. Patient at baseline mental status (A&O x 3).     ROS: Denies HA, CP, SOB, palpitation, N/V/D, fever, cough, chills, dizziness, abm pain, sick contact, change in bowel or urinary habits   A 10-system ROS was performed and is negative except as noted above and/or in the HPI.   (31 Mar 2025 23:58)  Above reviewed:   Per pt and daughter at bedside no localizing symptoms, did feel weak for 2-3 weeks, also night sweats intermittently. Did start tramadol as new medication in last month, no sick contacts, no procedure, dental work end of Dec.         PAST MEDICAL & SURGICAL HISTORY:  HTN (hypertension)      COPD (chronic obstructive pulmonary disease)      HLD (hyperlipidemia)      S/P TAVR (transcatheter aortic valve replacement)          REVIEW OF SYSTEMS    General: Fevers at home.      Skin: No rash  	  Ophthalmologic: Denies any discharge  	  ENMT: No nasal congestion    Respiratory and Thorax: No cough, sputum. Denies shortness of breath.  	  Cardiovascular: No chest pain,     Gastrointestinal: No nausea, abdominal pain or diarrhea.    Genitourinary: No dysuria,     Musculoskeletal: No joint swelling     Extremities: No swelling     Endocrine: No polyuria or polydipsia    Allergic/Immunologic:	No hives          Social history:  Lives alone         FAMILY HISTORY:  Mother: Heart ds.           Allergies  tetracycline (Rash; Urticaria)          Antimicrobials:  piperacillin/tazobactam IVPB.. 3.375 Gram(s) IV Intermittent every 8 hours        Vital Signs Last 24 Hrs  T(C): 36.6 (02 Apr 2025 11:29), Max: 36.9 (01 Apr 2025 20:43)  T(F): 97.9 (02 Apr 2025 11:29), Max: 98.4 (01 Apr 2025 20:43)  HR: 108 (02 Apr 2025 11:29) (106 - 116)  BP: 106/61 (02 Apr 2025 11:29) (101/50 - 128/96)  BP(mean): --  RR: 18 (02 Apr 2025 11:29) (18 - 18)  SpO2: 94% (02 Apr 2025 11:29) (91% - 97%)    Parameters below as of 02 Apr 2025 11:29  Patient On (Oxygen Delivery Method): nasal cannula  O2 Flow (L/min): 3      PHYSICAL EXAM: Patient in no acute distress.    Constitutional: Comfortable. Awake and alert    Eyes: No discharge    ENMT: No thrush.    Neck: Supple,     Respiratory:  + air entry bilaterally.    Cardiovascular: S1 S2 wnl,     Gastrointestinal: Soft  no tenderness, non distended.    Genitourinary: No otoole    Extremities: No edema.    Skin: No rash     Musculoskeletal: No joint swelling.    Psychiatric: Affect normal.                              8.8    22.07 )-----------( 319      ( 02 Apr 2025 09:22 )             29.7       04-02    138  |  101  |  14  ----------------------------<  115[H]  3.7   |  24  |  0.83    Ca    8.3[L]      02 Apr 2025 09:22    TPro  6.0  /  Alb  3.0[L]  /  TBili  0.5  /  DBili  x   /  AST  27  /  ALT  20  /  AlkPhos  88  04-02        Urinalysis + Microscopic Examination (03.31.25 @ 21:09)   pH Urine: 5.0  Urine Appearance: Cloudy  Color: Dark Yellow  Specific Gravity: 1.022  Protein, Urine: 100 mg/dL  Glucose Qualitative, Urine: Negative mg/dL  Ketone - Urine: 15 mg/dL  Blood, Urine: Moderate  Bilirubin: Negative  Urobilinogen: 1.0 mg/dL  Leukocyte Esterase Concentration: Negative  Nitrite: Negative  Review: Reviewed  White Blood Cell - Urine: 0 /HPF  Red Blood Cell - Urine: 2 /HPF  Bacteria: Negative /HPF  Cast: 4 /LPF  Epithelial Cells: 0 /HPF      Culture - Urine (collected 31 Mar 2025 21:09)  Source: Clean Catch Clean Catch (Midstream)  Final Report (01 Apr 2025 23:20):    <10,000 CFU/mL Normal Urogenital Meg    Culture - Blood (collected 31 Mar 2025 17:45)  Source: Blood Blood-Peripheral  Preliminary Report (01 Apr 2025 23:02):    No growth at 24 hours    Culture - Blood (collected 31 Mar 2025 17:40)  Source: Blood Blood-Peripheral  Preliminary Report (01 Apr 2025 23:02):    No growth at 24 hours          Radiology: Imaging reviewed and visualized personally [ x]      < from: Xray Chest 1 View- PORTABLE-Urgent (03.31.25 @ 19:26) >  IMPRESSION:  Left retrocardiac nodular opacity, better appreciated in CT of the chest.   Follow-up dedicated CT chest report.        < from: CT Abdomen and Pelvis w/ IV Cont (04.01.25 @ 18:15) >  IMPRESSION:  2.4 x 2.4 cm irregular noncalcified masslike density medial aspect of the   left lower lobe abutting the pleura which is new since October 2019.   Presumed neoplastic etiology until proven otherwise. PET/CT correlation   recommended for further evaluation.    2 cm left adrenal nodule previously measured 1.2 cm October 2019 with   attenuation values atthat time consistent with adenoma.    Spleen is enlarged measuring 15.5 cm. and increased in size from prior   exam October 2019. No focal defects    Scattered subcentimeter periaortic lymph nodes. Small bilateral external   iliac lymph nodes measuring up to 1.1 x 0.9 cm.    Please refer to detailed findings otherwise described above.            
HPI: Julienne Schuster is an 84 year old female with PMH of TAVR (2019), HTN, COPD (oxygen as needed), HLD, S1 fracture (no surgical intervention, 2/2025) who presented to the ED following a fall while walking to the bathroom. She reports her legs were giving out. Reports fever to 101. She was found to have leukocytosis to wbc=65k on admission. She reports she has had a high wbc count in the last few months outpatient, but does not recall how high the wbc count was.       PAST MEDICAL & SURGICAL HISTORY:  HTN (hypertension)      COPD (chronic obstructive pulmonary disease)      HLD (hyperlipidemia)      S/P TAVR (transcatheter aortic valve replacement)          Allergies    tetracycline (Rash; Urticaria)    Intolerances        MEDICATIONS  (STANDING):  albuterol/ipratropium for Nebulization 3 milliLiter(s) Nebulizer every 6 hours  enoxaparin Injectable 40 milliGRAM(s) SubCutaneous every 24 hours  fluticasone propionate/ salmeterol 250-50 MICROgram(s) Diskus 1 Dose(s) Inhalation two times a day  losartan 50 milliGRAM(s) Oral daily  pantoprazole    Tablet 40 milliGRAM(s) Oral before breakfast  piperacillin/tazobactam IVPB.. 3.375 Gram(s) IV Intermittent every 8 hours  sodium chloride 0.9%. 1000 milliLiter(s) (100 mL/Hr) IV Continuous <Continuous>    MEDICATIONS  (PRN):  acetaminophen     Tablet .. 650 milliGRAM(s) Oral every 6 hours PRN Temp greater or equal to 38C (100.4F), Mild Pain (1 - 3)  aluminum hydroxide/magnesium hydroxide/simethicone Suspension 30 milliLiter(s) Oral every 4 hours PRN Dyspepsia  melatonin 3 milliGRAM(s) Oral at bedtime PRN Insomnia  ondansetron Injectable 4 milliGRAM(s) IV Push every 8 hours PRN Nausea and/or Vomiting      FAMILY HISTORY:      SOCIAL HISTORY: No EtOH, no tobacco    REVIEW OF SYSTEMS:    CONSTITUTIONAL: +weakness, +fever  EYES/ENT: No visual changes;  No vertigo or throat pain   NECK: No pain or stiffness  RESPIRATORY: No cough, wheezing, hemoptysis; +chronic shortness of breath  CARDIOVASCULAR: No chest pain or palpitations  GASTROINTESTINAL: No abdominal or epigastric pain. No nausea, vomiting, or hematemesis; No diarrhea or constipation. No melena or hematochezia.  GENITOURINARY: No dysuria, frequency or hematuria  NEUROLOGICAL: No numbness or weakness  SKIN: No itching, burning, rashes, or lesions   All other review of systems is negative unless indicated above.    Height (cm): 162.6 (03-31 @ 17:11)  Weight (kg): 136.1 (03-31 @ 17:11)  BMI (kg/m2): 51.5 (03-31 @ 17:11)  BSA (m2): 2.32 (03-31 @ 17:11)    T(F): 98.4 (04-01-25 @ 05:00), Max: 99.7 (03-31-25 @ 17:11)  HR: 119 (04-01-25 @ 05:00)  BP: 159/74 (04-01-25 @ 05:00)  RR: 19 (04-01-25 @ 05:00)  SpO2: 98% (04-01-25 @ 05:00)  Wt(kg): --    GENERAL: NAD, resting in bed  HEAD:  Atraumatic, Normocephalic  EYES: EOMI, conjunctiva and sclera clear  NECK: Supple  CHEST/LUNG: +expiratory wheeze bilaterally  HEART: Tachycardic rate and regular rhythm  ABDOMEN: Soft, Nontender, Nondistended  EXTREMITIES:  2+ Peripheral Pulses, No LE edema  NEUROLOGY: AOx3  SKIN: No rashes or lesions                          9.3    39.81 )-----------( 317      ( 01 Apr 2025 08:08 )             31.4       04-01    138  |  104  |  14  ----------------------------<  118[H]  4.0   |  20[L]  |  0.83    Ca    8.1[L]      01 Apr 2025 08:08    TPro  7.2  /  Alb  4.0  /  TBili  1.3[H]  /  DBili  x   /  AST  56[H]  /  ALT  25  /  AlkPhos  87  03-31      Uric Acid: 5.6 mg/dL (03-31 @ 22:18)  Lactate Dehydrogenase, Serum: 328 U/L (03-31 @ 22:18)      PT/INR - ( 01 Apr 2025 08:08 )   PT: 18.8 sec;   INR: 1.64 ratio         PTT - ( 31 Mar 2025 17:57 )  PTT:34.9 sec      < from: CT Chest No Cont (03.31.25 @ 19:20) >  IMPRESSION:  Left lower lobe 2.4 cm nodule new since October 2, 2019 concerning for   neoplasm, possibly primary lung neoplasm.    Enlargement of the partially imaged spleen with interval increase in size   since 2019.    Oncology consultation is recommended.    --- End of Report ---    < end of copied text >

## 2025-04-03 NOTE — CONSULT NOTE ADULT - ATTENDING COMMENTS
84F w/ PMH of TAVR in 2019, COPD w/ PRN O2, HTN presenting on 3/31 after a fall the night prior. SHe is a former heavy smoker who quit in 1984 and has COPD, currently on budesonide and albuterol at home.  Daughter reports that she has persistent cough and shortness of breath and no inhalers have really helped her.  Also with recent fall and hurt her back, and has limited ambulation and significant pain after that.  Being told she will need to go to rehab after her hospitalization.    Found to be febrile to 101 but no subjective sick symptoms. Also found to have leukocytosis, tachycardia. Admitted for sepsis work up.  On Chest CT imaging found to have a new  irregular 2.4 x 2.4 nodule in the LLL.  IN 2019 there was no nodule. Daughter states that Dr. Mcdonnell has been following an irregular nodule for the last few years. We do not have that imaging or reports.    IMpression:  NOdule is suspicious for cancer. Currently hospitalized with acute infectious symptoms and experiencing severe back pain related to a fall.  She will need rehab therapy.  Acute issue is her pain and treatment of infection.  Bronchoscopy would have to be robotic according to our IP team.  Will discuss and see if this can be done next week at [facility] and advise.    Agree with plan as outlined above.
84-year-old female past medical history TAVR (2019), HTN, COPD (oxygen as needed), HLD, S1 fracture (no surgical intervention, 2/2025) presents to ED s/p fall at 7 PM yesterday. She is found to be febrile, tachycardic, HDS. Patient at baseline mental status (A&O x 3). Labs notable for WBC 65.5 with preserved Hgb and Plt, left shift with elevated neutrophils. CT notable for new left lower lobe 2.4 cm nodule new since October 2, 2019 concerning for neoplasm, possibly primary lung neoplasm. Hematology asked my medicine regarding leukocytosis.   # Leukocytosis   #Lung nodule  - Admission CBC: WBC=65.49, hgb=11.6, MCV=56.9, pxa=658  - Peripheral blood smear reviewed (4/1/25) showing predominantly segmented neutrophils, few dysplastic appearing WBCs, no blast seen. Few tear drop cells and rare schistocytes see. Large platelets seen.     - FQG=068, Uric acid=5.6, fibrinogen blzzac=802  - CT chest (3/31/25) showed left lower lobe 2.4 cm nodule new since October 2, 2019 concerning for neoplasm, possibly primary lung neoplasm. Enlargement of the partially imaged spleen with interval increase in size since 2019.  - Etiology of Leukocytosis is unclear at this time, may be a leukemoid reaction in setting of infection vs. a paraneoplastic leukemoid reaction which has been seen in solid tumors most commonly observed in non-small cell lung cancer. Lower suspicion for acute leukemia as patient's other cell lines are preserved, but will send a flow cytometry for a complete workup.    - Check peripheral blood flow cytometry (requisition form and blood tubes provided to RN)   - Please check hemoglobin electrophoresis, iron panel, ferritin  - Follow up CT C/A/P with contrast, pending results will determine optimal site for tissue biopsy for possible lung malignancy   - Agree with sepsis work up, antibiotics, and malignancy work up   - Hematology will follow

## 2025-04-03 NOTE — CHART NOTE - NSCHARTNOTEFT_GEN_A_CORE
84-year-old female past medical history TAVR (2019), HTN, COPD (oxygen as needed), HLD, S1 fracture (no surgical intervention, 2/2025) presents to ED s/p fall at 7 PM yesterday. She is found to be febrile, tachycardic, HDS. Patient at baseline mental status (A&O x 3). Labs notable for WBC 65.5 with preserved Hgb and Plt, left shift with elevated neutrophils. CT notable for new left lower lobe 2.4 cm nodule new since October 2, 2019 concerning for neoplasm, possibly primary lung neoplasm. Hematology asked my medicine regarding leukocytosis.     # Leukocytosis   - Smear reviewed: Predominantly segmented neutrophils, no blast.   - Suspect leukocytosis is leukomoid reaction in setting of infection. Low suspicion for acute leukemia as patient's rest of cell lines are preserved   - Obtain LDH, Uric acid, fibrinogen clauss  - Agree with sepsis work up, abx, and malignancy work up with CT C/A/P with contrast   - Agree with the ED’s assessment and find admission to internal medicine appropriate    Serene Landon MD MS  Hematology/Oncology Fellow PGY-4  [employer] at [facility] [place] | [facility] | [facility]  Available on Teams
Julienne Schuster is an 84 year old female with PMH of TAVR (2019), HTN, COPD (oxygen as needed), HLD, S1 fracture (no surgical intervention, 2/2025) who presented to the ED following a fall while walking to the bathroom. She reports her legs were giving out. Reports fever to 101.    CT- CAP (4/1/25):   --  2.4 x 2.4 cm irregular noncalcified masslike density medial aspect of the left lower lobe abutting the pleura.     Per EMR: Patient currently follows with Dr. Eber Mcdonnell ([facility]- Pulmonary), was supposed to get surveillance CT-Chest outpatient.     [facility] Consulted to assist in coordination of care and outpatient follow up.  -- Plan for PULM daniel for biopsy.   -- Will follow  Point of Contact: Sofie (Daughter): [phone]    Seda Trevizo   [facility]- [facility]   Available on TEAMS
84-year-old female past medical history TAVR (2019), HTN, COPD (oxygen as needed), HLD, S1 fracture (no surgical intervention, 2/2025) presents to ED s/p fall at 7 PM yesterday. She is found to be febrile, tachycardic, HDS. Patient at baseline mental status (A&O x 3). Labs notable for WBC 65.5 with preserved Hgb and Plt, left shift with elevated neutrophils.  CT notable for new left lower lobe 2.4 cm nodule new since October 2, 2019 concerning for neoplasm, possibly primary lung neoplasm.    # Leukocytosis (improving)  # Lung mass 2.4 cm  * CTCAP w con shows  2.4 cm LL mass, splenomegaly 15.5 cm, subcm periaortic LN, small b/l externa iliac LN upto 1.1 cm. No other organomegaly/LN  - Leukocytosis is improving steadily. Likely reactive. Flow cytometry in process  - Would recommend lung mass biopsy ( IR vs Pulm)  - We will reach out to cancer care direct for follow up of lung biopsy. Cancer care direct will do oncology referral if lung biopsy is consistent with malignancy  - Hematology will sign off. Further workup and management as per oncology if biopsy is positive for malignancy.    Discussed w attending Dr. Cabezas.    Sanjay Dodd MD  PGY4  Hematology-Oncology Fellow  [facility]

## 2025-04-04 ENCOUNTER — INPATIENT (INPATIENT)
Facility: HOSPITAL | Age: 85
LOS: 16 days | Discharge: NURSING, REHAB OR LONG-TERM CARE | End: 2025-04-21
Attending: STUDENT IN AN ORGANIZED HEALTH CARE EDUCATION/TRAINING PROGRAM | Admitting: STUDENT IN AN ORGANIZED HEALTH CARE EDUCATION/TRAINING PROGRAM
Payer: MEDICARE

## 2025-04-04 VITALS
TEMPERATURE: 98 F | OXYGEN SATURATION: 97 % | RESPIRATION RATE: 18 BRPM | DIASTOLIC BLOOD PRESSURE: 64 MMHG | HEART RATE: 98 BPM | SYSTOLIC BLOOD PRESSURE: 153 MMHG

## 2025-04-04 VITALS
OXYGEN SATURATION: 94 % | RESPIRATION RATE: 18 BRPM | TEMPERATURE: 99 F | SYSTOLIC BLOOD PRESSURE: 157 MMHG | DIASTOLIC BLOOD PRESSURE: 59 MMHG | HEART RATE: 98 BPM

## 2025-04-04 DIAGNOSIS — D72.829 ELEVATED WHITE BLOOD CELL COUNT, UNSPECIFIED: ICD-10-CM

## 2025-04-04 DIAGNOSIS — R91.1 SOLITARY PULMONARY NODULE: ICD-10-CM
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
## 2025-04-04 DIAGNOSIS — Z29.9 ENCOUNTER FOR PROPHYLACTIC MEASURES, UNSPECIFIED: ICD-10-CM

## 2025-04-04 DIAGNOSIS — I10 ESSENTIAL (PRIMARY) HYPERTENSION: ICD-10-CM

## 2025-04-04 DIAGNOSIS — A41.9 SEPSIS, UNSPECIFIED ORGANISM: ICD-10-CM

## 2025-04-04 DIAGNOSIS — J44.9 CHRONIC OBSTRUCTIVE PULMONARY DISEASE, UNSPECIFIED: ICD-10-CM

## 2025-04-04 LAB
EBV DNA SERPL NAA+PROBE-ACNC: SIGNIFICANT CHANGE UP IU/ML
EBVPCR LOG: SIGNIFICANT CHANGE UP LOG10IU/ML
HCT VFR BLD CALC: 29.5 % — LOW (ref 34.5–45)
HEMOGLOBIN INTERPRETATION: SIGNIFICANT CHANGE UP
HGB A MFR BLD: 93.7 % — LOW (ref 95.8–98)
HGB A2 MFR BLD: 6.3 % — HIGH (ref 2–3.2)
HGB BLD-MCNC: 8.9 G/DL — LOW (ref 11.5–15.5)
MCHC RBC-ENTMCNC: 16.8 PG — LOW (ref 27–34)
MCHC RBC-ENTMCNC: 30.2 G/DL — LOW (ref 32–36)
MCV RBC AUTO: 55.8 FL — LOW (ref 80–100)
NRBC BLD AUTO-RTO: 0 /100 WBCS — SIGNIFICANT CHANGE UP (ref 0–0)
PLATELET # BLD AUTO: 394 K/UL — SIGNIFICANT CHANGE UP (ref 150–400)
RBC # BLD: 5.29 M/UL — HIGH (ref 3.8–5.2)
RBC # FLD: 20.6 % — HIGH (ref 10.3–14.5)
TM INTERPRETATION: SIGNIFICANT CHANGE UP
WBC # BLD: 18.86 K/UL — HIGH (ref 3.8–10.5)
WBC # FLD AUTO: 18.86 K/UL — HIGH (ref 3.8–10.5)

## 2025-04-04 PROCEDURE — 85384 FIBRINOGEN ACTIVITY: CPT

## 2025-04-04 PROCEDURE — 87637 SARSCOV2&INF A&B&RSV AMP PRB: CPT

## 2025-04-04 PROCEDURE — 81001 URINALYSIS AUTO W/SCOPE: CPT

## 2025-04-04 PROCEDURE — 85610 PROTHROMBIN TIME: CPT

## 2025-04-04 PROCEDURE — 83540 ASSAY OF IRON: CPT

## 2025-04-04 PROCEDURE — 84550 ASSAY OF BLOOD/URIC ACID: CPT

## 2025-04-04 PROCEDURE — 83605 ASSAY OF LACTIC ACID: CPT

## 2025-04-04 PROCEDURE — 82330 ASSAY OF CALCIUM: CPT

## 2025-04-04 PROCEDURE — 86665 EPSTEIN-BARR CAPSID VCA: CPT

## 2025-04-04 PROCEDURE — 80053 COMPREHEN METABOLIC PANEL: CPT

## 2025-04-04 PROCEDURE — 87205 SMEAR GRAM STAIN: CPT

## 2025-04-04 PROCEDURE — 86480 TB TEST CELL IMMUN MEASURE: CPT

## 2025-04-04 PROCEDURE — 87799 DETECT AGENT NOS DNA QUANT: CPT

## 2025-04-04 PROCEDURE — 88264 CHROMOSOME ANALYSIS 20-25: CPT

## 2025-04-04 PROCEDURE — 99285 EMERGENCY DEPT VISIT HI MDM: CPT

## 2025-04-04 PROCEDURE — 83615 LACTATE (LD) (LDH) ENZYME: CPT

## 2025-04-04 PROCEDURE — 0225U NFCT DS DNA&RNA 21 SARSCOV2: CPT

## 2025-04-04 PROCEDURE — 85018 HEMOGLOBIN: CPT

## 2025-04-04 PROCEDURE — 71045 X-RAY EXAM CHEST 1 VIEW: CPT

## 2025-04-04 PROCEDURE — 88280 CHROMOSOME KARYOTYPE STUDY: CPT

## 2025-04-04 PROCEDURE — 86663 EPSTEIN-BARR ANTIBODY: CPT

## 2025-04-04 PROCEDURE — 86664 EPSTEIN-BARR NUCLEAR ANTIGEN: CPT

## 2025-04-04 PROCEDURE — 84132 ASSAY OF SERUM POTASSIUM: CPT

## 2025-04-04 PROCEDURE — 36415 COLL VENOUS BLD VENIPUNCTURE: CPT

## 2025-04-04 PROCEDURE — 86403 PARTICLE AGGLUT ANTBDY SCRN: CPT

## 2025-04-04 PROCEDURE — 87641 MR-STAPH DNA AMP PROBE: CPT

## 2025-04-04 PROCEDURE — 83020 HEMOGLOBIN ELECTROPHORESIS: CPT

## 2025-04-04 PROCEDURE — 87385 HISTOPLASMA CAPSUL AG IA: CPT

## 2025-04-04 PROCEDURE — 71260 CT THORAX DX C+: CPT | Mod: MC

## 2025-04-04 PROCEDURE — 86644 CMV ANTIBODY: CPT

## 2025-04-04 PROCEDURE — 82550 ASSAY OF CK (CPK): CPT

## 2025-04-04 PROCEDURE — 80048 BASIC METABOLIC PNL TOTAL CA: CPT

## 2025-04-04 PROCEDURE — 82803 BLOOD GASES ANY COMBINATION: CPT

## 2025-04-04 PROCEDURE — 82435 ASSAY OF BLOOD CHLORIDE: CPT

## 2025-04-04 PROCEDURE — 99239 HOSP IP/OBS DSCHRG MGMT >30: CPT

## 2025-04-04 PROCEDURE — 93005 ELECTROCARDIOGRAM TRACING: CPT

## 2025-04-04 PROCEDURE — 82728 ASSAY OF FERRITIN: CPT

## 2025-04-04 PROCEDURE — 85025 COMPLETE CBC W/AUTO DIFF WBC: CPT

## 2025-04-04 PROCEDURE — 87086 URINE CULTURE/COLONY COUNT: CPT

## 2025-04-04 PROCEDURE — 72131 CT LUMBAR SPINE W/O DYE: CPT | Mod: MC

## 2025-04-04 PROCEDURE — 85027 COMPLETE CBC AUTOMATED: CPT

## 2025-04-04 PROCEDURE — 82947 ASSAY GLUCOSE BLOOD QUANT: CPT

## 2025-04-04 PROCEDURE — 73110 X-RAY EXAM OF WRIST: CPT

## 2025-04-04 PROCEDURE — 85730 THROMBOPLASTIN TIME PARTIAL: CPT

## 2025-04-04 PROCEDURE — 87040 BLOOD CULTURE FOR BACTERIA: CPT

## 2025-04-04 PROCEDURE — 88185 FLOWCYTOMETRY/TC ADD-ON: CPT

## 2025-04-04 PROCEDURE — 85014 HEMATOCRIT: CPT

## 2025-04-04 PROCEDURE — 83550 IRON BINDING TEST: CPT

## 2025-04-04 PROCEDURE — 99233 SBSQ HOSP IP/OBS HIGH 50: CPT | Mod: GC

## 2025-04-04 PROCEDURE — 97530 THERAPEUTIC ACTIVITIES: CPT

## 2025-04-04 PROCEDURE — 88184 FLOWCYTOMETRY/ TC 1 MARKER: CPT

## 2025-04-04 PROCEDURE — 88285 CHROMOSOME COUNT ADDITIONAL: CPT

## 2025-04-04 PROCEDURE — 99223 1ST HOSP IP/OBS HIGH 75: CPT

## 2025-04-04 PROCEDURE — 97161 PT EVAL LOW COMPLEX 20 MIN: CPT

## 2025-04-04 PROCEDURE — 87640 STAPH A DNA AMP PROBE: CPT

## 2025-04-04 PROCEDURE — 86645 CMV ANTIBODY IGM: CPT

## 2025-04-04 PROCEDURE — 84295 ASSAY OF SERUM SODIUM: CPT

## 2025-04-04 PROCEDURE — 74177 CT ABD & PELVIS W/CONTRAST: CPT | Mod: MC

## 2025-04-04 PROCEDURE — 94640 AIRWAY INHALATION TREATMENT: CPT

## 2025-04-04 PROCEDURE — 85385 FIBRINOGEN ANTIGEN: CPT

## 2025-04-04 PROCEDURE — 88237 TISSUE CULTURE BONE MARROW: CPT

## 2025-04-04 PROCEDURE — 71250 CT THORAX DX C-: CPT | Mod: MC

## 2025-04-04 PROCEDURE — 81207 BCR/ABL1 GENE MINOR BP: CPT

## 2025-04-04 PROCEDURE — 81206 BCR/ABL1 GENE MAJOR BP: CPT

## 2025-04-04 PROCEDURE — 96375 TX/PRO/DX INJ NEW DRUG ADDON: CPT

## 2025-04-04 PROCEDURE — 96374 THER/PROPH/DIAG INJ IV PUSH: CPT

## 2025-04-04 RX ORDER — MELATONIN 5 MG
1 TABLET ORAL
Qty: 0 | Refills: 0 | DISCHARGE
Start: 2025-04-04

## 2025-04-04 RX ORDER — ACETAMINOPHEN 500 MG/5ML
2 LIQUID (ML) ORAL
Qty: 0 | Refills: 0 | DISCHARGE
Start: 2025-04-04

## 2025-04-04 RX ORDER — ENOXAPARIN SODIUM 100 MG/ML
40 INJECTION SUBCUTANEOUS EVERY 24 HOURS
Refills: 0 | Status: DISCONTINUED | OUTPATIENT
Start: 2025-04-04 | End: 2025-04-04

## 2025-04-04 RX ORDER — DEXTROMETHORPHAN HBR, GUAIFENESIN 200 MG/10ML
100 LIQUID ORAL EVERY 6 HOURS
Refills: 0 | Status: DISCONTINUED | OUTPATIENT
Start: 2025-04-04 | End: 2025-04-10

## 2025-04-04 RX ORDER — LOSARTAN POTASSIUM 100 MG/1
50 TABLET, FILM COATED ORAL DAILY
Refills: 0 | Status: DISCONTINUED | OUTPATIENT
Start: 2025-04-05 | End: 2025-04-21

## 2025-04-04 RX ORDER — SENNA 187 MG
2 TABLET ORAL AT BEDTIME
Refills: 0 | Status: DISCONTINUED | OUTPATIENT
Start: 2025-04-04 | End: 2025-04-21

## 2025-04-04 RX ORDER — IPRATROPIUM BROMIDE AND ALBUTEROL SULFATE .5; 2.5 MG/3ML; MG/3ML
3 SOLUTION RESPIRATORY (INHALATION)
Qty: 0 | Refills: 0 | DISCHARGE
Start: 2025-04-04

## 2025-04-04 RX ORDER — LIDOCAINE HYDROCHLORIDE 20 MG/ML
1 JELLY TOPICAL
Qty: 0 | Refills: 0 | DISCHARGE
Start: 2025-04-04

## 2025-04-04 RX ORDER — ACETAMINOPHEN 500 MG/5ML
650 LIQUID (ML) ORAL EVERY 6 HOURS
Refills: 0 | Status: DISCONTINUED | OUTPATIENT
Start: 2025-04-04 | End: 2025-04-21

## 2025-04-04 RX ORDER — POLYETHYLENE GLYCOL 3350 17 G/17G
17 POWDER, FOR SOLUTION ORAL
Qty: 0 | Refills: 0 | DISCHARGE
Start: 2025-04-04

## 2025-04-04 RX ORDER — ROSUVASTATIN CALCIUM 20 MG/1
40 TABLET, FILM COATED ORAL AT BEDTIME
Refills: 0 | Status: DISCONTINUED | OUTPATIENT
Start: 2025-04-04 | End: 2025-04-21

## 2025-04-04 RX ORDER — PROMETHAZINE HYDROCHLORIDE 25 MG/ML
1 INJECTION INTRAMUSCULAR; INTRAVENOUS
Qty: 0 | Refills: 0 | DISCHARGE
Start: 2025-04-04

## 2025-04-04 RX ORDER — PROMETHAZINE HYDROCHLORIDE 25 MG/ML
25 INJECTION INTRAMUSCULAR; INTRAVENOUS EVERY 6 HOURS
Refills: 0 | Status: DISCONTINUED | OUTPATIENT
Start: 2025-04-04 | End: 2025-04-21

## 2025-04-04 RX ORDER — PIPERACILLIN-TAZO-DEXTROSE,ISO 2.25G/50ML
3.38 IV SOLUTION, PIGGYBACK PREMIX FROZEN(ML) INTRAVENOUS EVERY 8 HOURS
Refills: 0 | Status: COMPLETED | OUTPATIENT
Start: 2025-04-04 | End: 2025-04-08

## 2025-04-04 RX ORDER — ALBUTEROL SULFATE 2.5 MG/3ML
3 VIAL, NEBULIZER (ML) INHALATION
Refills: 0 | DISCHARGE

## 2025-04-04 RX ORDER — ROSUVASTATIN CALCIUM 5 MG/1
1 TABLET, FILM COATED ORAL
Qty: 0 | Refills: 0 | DISCHARGE
Start: 2025-04-04

## 2025-04-04 RX ORDER — TRAMADOL HYDROCHLORIDE 50 MG/1
50 TABLET, FILM COATED ORAL EVERY 8 HOURS
Refills: 0 | Status: DISCONTINUED | OUTPATIENT
Start: 2025-04-04 | End: 2025-04-05

## 2025-04-04 RX ORDER — TIOTROPIUM BROMIDE INHALATION SPRAY 3.12 UG/1
1 SPRAY, METERED RESPIRATORY (INHALATION)
Refills: 0 | DISCHARGE

## 2025-04-04 RX ORDER — POLYETHYLENE GLYCOL 3350 17 G/17G
17 POWDER, FOR SOLUTION ORAL DAILY
Refills: 0 | Status: DISCONTINUED | OUTPATIENT
Start: 2025-04-04 | End: 2025-04-21

## 2025-04-04 RX ORDER — IPRATROPIUM BROMIDE AND ALBUTEROL SULFATE .5; 2.5 MG/3ML; MG/3ML
3 SOLUTION RESPIRATORY (INHALATION) EVERY 6 HOURS
Refills: 0 | Status: DISCONTINUED | OUTPATIENT
Start: 2025-04-04 | End: 2025-04-21

## 2025-04-04 RX ORDER — GABAPENTIN 400 MG/1
100 CAPSULE ORAL
Refills: 0 | Status: DISCONTINUED | OUTPATIENT
Start: 2025-04-04 | End: 2025-04-21

## 2025-04-04 RX ORDER — MELATONIN 5 MG
3 TABLET ORAL AT BEDTIME
Refills: 0 | Status: DISCONTINUED | OUTPATIENT
Start: 2025-04-04 | End: 2025-04-21

## 2025-04-04 RX ORDER — GABAPENTIN 400 MG/1
1 CAPSULE ORAL
Qty: 0 | Refills: 0 | DISCHARGE
Start: 2025-04-04

## 2025-04-04 RX ORDER — OMEPRAZOLE 20 MG/1
1 CAPSULE, DELAYED RELEASE ORAL
Refills: 0 | DISCHARGE

## 2025-04-04 RX ORDER — DEXTROMETHORPHAN HBR, GUAIFENESIN 200 MG/10ML
5 LIQUID ORAL
Qty: 0 | Refills: 0 | DISCHARGE
Start: 2025-04-04

## 2025-04-04 RX ORDER — LIDOCAINE HYDROCHLORIDE 20 MG/ML
1 JELLY TOPICAL DAILY
Refills: 0 | Status: DISCONTINUED | OUTPATIENT
Start: 2025-04-04 | End: 2025-04-21

## 2025-04-04 RX ORDER — TRAMADOL HYDROCHLORIDE 50 MG/1
1 TABLET, FILM COATED ORAL
Qty: 0 | Refills: 0 | DISCHARGE
Start: 2025-04-04

## 2025-04-04 RX ORDER — ENOXAPARIN SODIUM 100 MG/ML
40 INJECTION SUBCUTANEOUS EVERY 12 HOURS
Refills: 0 | Status: DISCONTINUED | OUTPATIENT
Start: 2025-04-04 | End: 2025-04-06

## 2025-04-04 RX ORDER — SENNA 187 MG
2 TABLET ORAL
Qty: 0 | Refills: 0 | DISCHARGE
Start: 2025-04-04

## 2025-04-04 RX ADMIN — ENOXAPARIN SODIUM 40 MILLIGRAM(S): 100 INJECTION SUBCUTANEOUS at 06:01

## 2025-04-04 RX ADMIN — GABAPENTIN 100 MILLIGRAM(S): 400 CAPSULE ORAL at 17:09

## 2025-04-04 RX ADMIN — LOSARTAN POTASSIUM 50 MILLIGRAM(S): 100 TABLET, FILM COATED ORAL at 06:00

## 2025-04-04 RX ADMIN — Medication 1 DOSE(S): at 09:45

## 2025-04-04 RX ADMIN — ENOXAPARIN SODIUM 40 MILLIGRAM(S): 100 INJECTION SUBCUTANEOUS at 17:08

## 2025-04-04 RX ADMIN — LIDOCAINE HYDROCHLORIDE 1 PATCH: 20 JELLY TOPICAL at 07:30

## 2025-04-04 RX ADMIN — Medication 25 GRAM(S): at 21:57

## 2025-04-04 RX ADMIN — Medication 25 GRAM(S): at 06:03

## 2025-04-04 RX ADMIN — Medication 650 MILLIGRAM(S): at 02:59

## 2025-04-04 RX ADMIN — GABAPENTIN 100 MILLIGRAM(S): 400 CAPSULE ORAL at 06:00

## 2025-04-04 RX ADMIN — Medication 1 DOSE(S): at 22:02

## 2025-04-04 RX ADMIN — DEXTROMETHORPHAN HBR, GUAIFENESIN 100 MILLIGRAM(S): 200 LIQUID ORAL at 02:59

## 2025-04-04 RX ADMIN — Medication 650 MILLIGRAM(S): at 03:50

## 2025-04-04 RX ADMIN — Medication 40 MILLIGRAM(S): at 06:05

## 2025-04-04 RX ADMIN — IPRATROPIUM BROMIDE AND ALBUTEROL SULFATE 3 MILLILITER(S): .5; 2.5 SOLUTION RESPIRATORY (INHALATION) at 22:07

## 2025-04-04 RX ADMIN — IPRATROPIUM BROMIDE AND ALBUTEROL SULFATE 3 MILLILITER(S): .5; 2.5 SOLUTION RESPIRATORY (INHALATION) at 11:16

## 2025-04-04 NOTE — PATIENT PROFILE ADULT - MONEY FOR FOOD
12/6/2018      Mr[address] Ovi Blandon  [address] [facility] [address]        Dear Mr. Ovi Blandon,     Our records indicate it is time for your follow-up colonoscopy. Your last procedure was on 1-.    Please call the office at [phone].     If you have chosen to complete your colonoscopy outside of [facility], please give us a call so we can update your records. If you have any questions or concerns, we would be happy to discuss them with you.    Sincerely,       Dr. Ann Wiggins  Gastroenterology Department  [facility]   no

## 2025-04-04 NOTE — PATIENT PROFILE ADULT - NSPRESCRUSEDDRG_GEN_A_NUR
PROVIDER:[TOKEN:[[id number]],FOLLOWUP:[1-3 days]],PROVIDER:[TOKEN:[[id number]],FOLLOWUP:[1-3 days]] No

## 2025-04-04 NOTE — H&P ADULT - PROBLEM SELECTOR PLAN 6
s/p recent fall  - CT spine with s1 age indeterminant fx   - Tylenol/Tramadol PRN, lidocaine patch  - PT eval

## 2025-04-04 NOTE — H&P ADULT - PROBLEM SELECTOR PLAN 4
respiratory status stable  - continue symbicort, duoneb respiratory status stable  - continue advair BID, duoneb q6h

## 2025-04-04 NOTE — DISCHARGE NOTE PROVIDER - EXTENDED VTE YES NO FOR MLM ENOXAPARIN
"Anesthesia Transfer of Care Note    Patient: Romeo Larson    Procedure(s) Performed: Procedure(s) (LRB):  TRANSPLANT, LIVER (N/A)  ULTRASOUND, DIAGNOSTIC (N/A)    Patient location: ICU    Anesthesia Type: general    Transport from OR: Transported from OR intubated on 100% O2 by AMBU with adequate controlled ventilation. Upon arrival to PACU/ICU, patient attached to ventilator and auscultated to confirm bilateral breath sounds and adequate TV. Continuous SpO2 monitoring in transport. Continuous ECG monitoring in transport. Continuos invasive BP monitoring in transport    Post pain: adequate analgesia    Post assessment: no apparent anesthetic complications and tolerated procedure well    Post vital signs: stable    Level of consciousness: seizure activity    Nausea/Vomiting: no nausea/vomiting    Complications: none    Transfer of care protocol was followed      Last vitals:   Visit Vitals  BP (!) 127/95 (BP Location: Left arm, Patient Position: Lying)   Pulse 70   Temp 36.8 °C (98.3 °F) (Oral)   Resp 18   Ht 5' 8" (1.727 m)   Wt 87.3 kg (192 lb 7.4 oz)   SpO2 100%   BMI 29.26 kg/m²     " ,

## 2025-04-04 NOTE — H&P ADULT - PROBLEM SELECTOR PLAN 1
noted on imaging  - CT with 2.4cm lung nodule  - IR unable to obtain percutaneous biopsy  - appreciate IP - planned for bronchoscopy 4/7

## 2025-04-04 NOTE — DISCHARGE NOTE PROVIDER - NSDCCPCAREPLAN_GEN_ALL_CORE_FT
PRINCIPAL DISCHARGE DIAGNOSIS  Diagnosis: Nodule of left lung  Assessment and Plan of Treatment: Plan for transfer to [facility] for robotic navigational bronchoscopy and biopsy as soon as possible. This will help determine the cause of the LLL mass and guide further treatment. You will also need follow-up for the left adrenal nodule and periaortic lymphadenopathy.      SECONDARY DISCHARGE DIAGNOSES  Diagnosis: Fall  Assessment and Plan of Treatment: Safety: Take precautions to prevent falls. Remove tripping hazards in your home, ensure adequate lighting, and use assistive devices (e.g., walker, cane) if necessary. Physical therapy may be helpful to improve strength and balance. Discuss this with your physician.  Review Medications: Some medications can increase the risk of falls. Review your medications with your physician or pharmacist to identify any potential risks.     PRINCIPAL DISCHARGE DIAGNOSIS  Diagnosis: Nodule of left lung  Assessment and Plan of Treatment: seen with LLL lung nodule 2.4cm on CT  IR unable to obtain percutaneous biopsy.   Pulrani sanchez appreciated - plan for inpt lung biopsy with interventional pulm at [facility]   Plan for transfer to [facility] for robotic navigational bronchoscopy and biopsy as soon as possible. This will help determine the cause of the LLL mass and guide further treatment. You will also need follow-up for the left adrenal nodule and periaortic lymphadenopathy.  Accepting Physician is Dr. Fela Boyce      SECONDARY DISCHARGE DIAGNOSES  Diagnosis: Fall  Assessment and Plan of Treatment: - CXR: atraumatic   - CT spine with s1 age indeterminant fx   - PT consult - NIKHIL  - Fall precaution.  Safety: Take precautions to prevent falls. Remove tripping hazards in your home, ensure adequate lighting, and use assistive devices (e.g., walker, cane) if necessary. Physical therapy may be helpful to improve strength and balance. Discuss this with your physician.  Review Medications: Some medications can increase the risk of falls. Review your medications with your physician or pharmacist to identify any potential risks.    Diagnosis: Sepsis  Assessment and Plan of Treatment: On Zosyn x 7 days for possible pneumonia   So far infectious w/u unrevealing.   CT c/a/p 4/1 without evidence of acute infectious findings.    BCx so far NGTD   currently on emperic zosyn.   ELIZABETH sanchez appreciated. plan for lung biopsy - will need additional infectious testing with biopsy  Plan to complete 7 days of zosyn  until 4/7.      Diagnosis: HTN (hypertension)  Assessment and Plan of Treatment: - Losartan.      Diagnosis: COPD (chronic obstructive pulmonary disease)  Assessment and Plan of Treatment: Continue  Duo- Neb and Advair.

## 2025-04-04 NOTE — PATIENT PROFILE ADULT - FALL HARM RISK - HARM RISK INTERVENTIONS
Assistance with ambulation/Assistance OOB with selected safe patient handling equipment/Communicate Risk of Fall with Harm to all staff/Discuss with provider need for PT consult/Monitor gait and stability/Reinforce activity limits and safety measures with patient and family/Tailored Fall Risk Interventions/Visual Cue: Yellow wristband and red socks/Bed in lowest position, wheels locked, appropriate side rails in place/Call bell, personal items and telephone in reach/Instruct patient to call for assistance before getting out of bed or chair/Non-slip footwear when patient is out of bed/[place] to call system/Physically safe environment - no spills, clutter or unnecessary equipment/Purposeful Proactive Rounding/Room/bathroom lighting operational, light cord in reach

## 2025-04-04 NOTE — PROGRESS NOTE ADULT - SUBJECTIVE AND OBJECTIVE BOX
CHIEF COMPLAINT:    Interval Events:    NAEON.    REVIEW OF SYSTEMS:  [ x ] All other systems negative  [ ] Unable to assess ROS because ________    OBJECTIVE:  ICU Vital Signs Last 24 Hrs  T(C): 36.8 (04 Apr 2025 04:26), Max: 37.8 (03 Apr 2025 20:51)  T(F): 98.2 (04 Apr 2025 04:26), Max: 100 (03 Apr 2025 20:51)  HR: 100 (04 Apr 2025 04:26) (90 - 110)  BP: 151/71 (04 Apr 2025 04:26) (126/67 - 173/77)  BP(mean): --  ABP: --  ABP(mean): --  RR: 18 (04 Apr 2025 04:26) (18 - 18)  SpO2: 95% (04 Apr 2025 04:26) (95% - 97%)    O2 Parameters below as of 04 Apr 2025 04:26  Patient On (Oxygen Delivery Method): nasal cannula  O2 Flow (L/min): 3            04-03 @ 07:01  -  04-04 @ 07:00  --------------------------------------------------------  IN: 0 mL / OUT: 1950 mL / NET: -1950 mL      CAPILLARY BLOOD GLUCOSE          PHYSICAL EXAM:  General: NAD  HEENT: Normal sclera  Lymph Nodes: No LAD  Respiratory: crackles  Cardiovascular: Regular rate and rhythm no m/r/g  Abdomen: Nontender nondistended  Extremities: No peripheral edema  Skin: No rashes  Neurological: No appreciable neurologic deficits  Psychiatry: Appropriate mood and affect    HOSPITAL MEDICATIONS:  MEDICATIONS  (STANDING):  albuterol/ipratropium for Nebulization 3 milliLiter(s) Nebulizer every 6 hours  enoxaparin Injectable 40 milliGRAM(s) SubCutaneous every 24 hours  fluticasone propionate/ salmeterol 250-50 MICROgram(s) Diskus 1 Dose(s) Inhalation two times a day  gabapentin 100 milliGRAM(s) Oral two times a day  lidocaine   4% Patch 1 Patch Transdermal daily  losartan 50 milliGRAM(s) Oral daily  pantoprazole    Tablet 40 milliGRAM(s) Oral before breakfast  piperacillin/tazobactam IVPB.. 3.375 Gram(s) IV Intermittent every 8 hours  polyethylene glycol 3350 17 Gram(s) Oral daily  rosuvastatin 40 milliGRAM(s) Oral at bedtime  senna 2 Tablet(s) Oral at bedtime  sodium chloride 0.9%. 1000 milliLiter(s) (100 mL/Hr) IV Continuous <Continuous>    MEDICATIONS  (PRN):  acetaminophen     Tablet .. 650 milliGRAM(s) Oral every 6 hours PRN Temp greater or equal to 38C (100.4F), Mild Pain (1 - 3)  aluminum hydroxide/magnesium hydroxide/simethicone Suspension 30 milliLiter(s) Oral every 4 hours PRN Dyspepsia  guaiFENesin Oral Liquid (Sugar-Free) 100 milliGRAM(s) Oral every 6 hours PRN Cough  melatonin 3 milliGRAM(s) Oral at bedtime PRN Insomnia  ondansetron Injectable 4 milliGRAM(s) IV Push every 8 hours PRN Nausea and/or Vomiting  promethazine 25 milliGRAM(s) Oral every 6 hours PRN allergics  traMADol 50 milliGRAM(s) Oral every 8 hours PRN Moderate Pain (4 - 6)      LABS:                        8.9    18.86 )-----------( 394      ( 04 Apr 2025 06:29 )             29.5     Hgb Trend: 8.9<--, 8.5<--, 8.8<--, 9.3<--, 10.1<--  04-03    138  |  101  |  13  ----------------------------<  97  3.8   |  24  |  0.82    Ca    8.3[L]      03 Apr 2025 06:44    TPro  6.0  /  Alb  3.0[L]  /  TBili  0.5  /  DBili  x   /  AST  27  /  ALT  20  /  AlkPhos  88  04-02    Creatinine Trend: 0.82<--, 0.83<--, 0.83<--, 0.93<--    Urinalysis Basic - ( 03 Apr 2025 06:44 )    Color: x / Appearance: x / SG: x / pH: x  Gluc: 97 mg/dL / Ketone: x  / Bili: x / Urobili: x   Blood: x / Protein: x / Nitrite: x   Leuk Esterase: x / RBC: x / WBC x   Sq Epi: x / Non Sq Epi: x / Bacteria: x            MICROBIOLOGY:       RADIOLOGY:  [ x ] Reviewed and interpreted by me    PULMONARY FUNCTION TESTS:    EKG:
84yPatient is a 84y old  Female who presents with a chief complaint of fall (03 Apr 2025 15:48)      Interval history:  Afebrile, no new complains.       Allergies:   tetracycline (Rash; Urticaria)    Antimicrobials:  piperacillin/tazobactam IVPB.. 3.375 Gram(s) IV Intermittent every 8 hours      REVIEW OF SYSTEMS:  No chest pain   cough unchanged   No abdominal pain  No dysuria   No rash.       Vital Signs Last 24 Hrs  T(C): 36.9 (04-03-25 @ 12:34), Max: 36.9 (04-02-25 @ 20:54)  T(F): 98.4 (04-03-25 @ 12:34), Max: 98.5 (04-02-25 @ 20:54)  HR: 90 (04-03-25 @ 12:34) (90 - 102)  BP: 126/67 (04-03-25 @ 12:34) (115/65 - 135/53)  BP(mean): --  RR: 18 (04-03-25 @ 12:34) (18 - 18)  SpO2: 97% (04-03-25 @ 12:34) (94% - 98%)      PHYSICAL EXAM:  Pt in no acute distress, alert, awake.   breathing comfortably on NC  non distended abdomen  no edema LE   no phlebitis                             8.5    15.93 )-----------( 375      ( 03 Apr 2025 06:46 )             28.5   04-03    138  |  101  |  13  ----------------------------<  97  3.8   |  24  |  0.82    Ca    8.3[L]      03 Apr 2025 06:44    TPro  6.0  /  Alb  3.0[L]  /  TBili  0.5  /  DBili  x   /  AST  27  /  ALT  20  /  AlkPhos  88  04-02      LIVER FUNCTIONS - ( 02 Apr 2025 09:22 )  Alb: 3.0 g/dL / Pro: 6.0 g/dL / ALK PHOS: 88 U/L / ALT: 20 U/L / AST: 27 U/L / GGT: x               Culture - Urine (collected 31 Mar 2025 21:09)  Source: Clean Catch Clean Catch (Midstream)  Final Report (01 Apr 2025 23:20):    <10,000 CFU/mL Normal Urogenital Meg    Culture - Blood (collected 31 Mar 2025 17:45)  Source: Blood Blood-Peripheral  Preliminary Report (02 Apr 2025 23:09):    No growth at 48 Hours    Culture - Blood (collected 31 Mar 2025 17:40)  Source: Blood Blood-Peripheral  Preliminary Report (02 Apr 2025 23:09):    No growth at 48 Hours        Radiology:  < from: Xray Wrist 3 Views, Left (04.03.25 @ 13:55) >  IMPRESSION:  No fractures or dislocations.    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 Preserved remaining joint spaces andno   joint margin erosions.    Carpal bones normally aligned.    Neutral ulnar variance.    Generalized osteopenia otherwise no discrete lytic or blastic lesions.      
[facility] Division of Hospital Medicine  Edie Farfan MD  Pager (M-F, 2X-0H): [phone]  Other Times:  [phone]    Patient is a 84y old  Female who presents with a chief complaint of fall (02 Apr 2025 15:56)      SUBJECTIVE / OVERNIGHT EVENTS:  feeling overall better. afebrile. no acute overnight issues.     ADDITIONAL REVIEW OF SYSTEMS: otherwise neg    MEDICATIONS  (STANDING):  albuterol/ipratropium for Nebulization 3 milliLiter(s) Nebulizer every 6 hours  enoxaparin Injectable 40 milliGRAM(s) SubCutaneous every 24 hours  fluticasone propionate/ salmeterol 250-50 MICROgram(s) Diskus 1 Dose(s) Inhalation two times a day  gabapentin 100 milliGRAM(s) Oral two times a day  lidocaine   4% Patch 1 Patch Transdermal daily  losartan 50 milliGRAM(s) Oral daily  pantoprazole    Tablet 40 milliGRAM(s) Oral before breakfast  piperacillin/tazobactam IVPB.. 3.375 Gram(s) IV Intermittent every 8 hours  polyethylene glycol 3350 17 Gram(s) Oral daily  rosuvastatin 40 milliGRAM(s) Oral at bedtime  senna 2 Tablet(s) Oral at bedtime  sodium chloride 0.9%. 1000 milliLiter(s) (100 mL/Hr) IV Continuous <Continuous>    MEDICATIONS  (PRN):  acetaminophen     Tablet .. 650 milliGRAM(s) Oral every 6 hours PRN Temp greater or equal to 38C (100.4F), Mild Pain (1 - 3)  aluminum hydroxide/magnesium hydroxide/simethicone Suspension 30 milliLiter(s) Oral every 4 hours PRN Dyspepsia  guaiFENesin Oral Liquid (Sugar-Free) 100 milliGRAM(s) Oral every 6 hours PRN Cough  melatonin 3 milliGRAM(s) Oral at bedtime PRN Insomnia  ondansetron Injectable 4 milliGRAM(s) IV Push every 8 hours PRN Nausea and/or Vomiting  promethazine 25 milliGRAM(s) Oral every 6 hours PRN allergics  traMADol 50 milliGRAM(s) Oral every 8 hours PRN Moderate Pain (4 - 6)      CAPILLARY BLOOD GLUCOSE        I&O's Summary    02 Apr 2025 07:01  -  03 Apr 2025 07:00  --------------------------------------------------------  IN: 480 mL / OUT: 900 mL / NET: -420 mL        PHYSICAL EXAM:  Vital Signs Last 24 Hrs  T(C): 36.9 (03 Apr 2025 12:34), Max: 36.9 (02 Apr 2025 20:54)  T(F): 98.4 (03 Apr 2025 12:34), Max: 98.5 (02 Apr 2025 20:54)  HR: 90 (03 Apr 2025 12:34) (90 - 102)  BP: 126/67 (03 Apr 2025 12:34) (115/65 - 135/53)  BP(mean): --  RR: 18 (03 Apr 2025 12:34) (18 - 18)  SpO2: 97% (03 Apr 2025 12:34) (94% - 98%)    Parameters below as of 03 Apr 2025 12:34  Patient On (Oxygen Delivery Method): nasal cannula  O2 Flow (L/min): 3      CONSTITUTIONAL: NAD, obese  EYES:  conjunctiva and sclera clear  ENMT: Moist oral mucosa  NECK: Supple, no palpable masses; no JVD  RESPIRATORY: Normal respiratory effort; lungs are clear to auscultation bilaterally  CARDIOVASCULAR: tachycardic  normal S1 and S2, no murmur/rub/gallop; trace lower extremity edema  ABDOMEN: Nontender to palpation, normoactive bowel sounds, no rebound/guarding  MUSCULOSKELETAL:  no clubbing or cyanosis of digits; no joint swelling or tenderness to palpation  PSYCH: A+O to person, place, and time; affect appropriate  SKIN: No rashes; no palpable lesions    LABS:                        8.5    15.93 )-----------( 375      ( 03 Apr 2025 06:46 )             28.5     04-03    138  |  101  |  13  ----------------------------<  97  3.8   |  24  |  0.82    Ca    8.3[L]      03 Apr 2025 06:44    TPro  6.0  /  Alb  3.0[L]  /  TBili  0.5  /  DBili  x   /  AST  27  /  ALT  20  /  AlkPhos  88  04-02          Urinalysis Basic - ( 03 Apr 2025 06:44 )    Color: x / Appearance: x / SG: x / pH: x  Gluc: 97 mg/dL / Ketone: x  / Bili: x / Urobili: x   Blood: x / Protein: x / Nitrite: x   Leuk Esterase: x / RBC: x / WBC x   Sq Epi: x / Non Sq Epi: x / Bacteria: x        Culture - Urine (collected 31 Mar 2025 21:09)  Source: Clean Catch Clean Catch (Midstream)  Final Report (01 Apr 2025 23:20):    <10,000 CFU/mL Normal Urogenital Meg    Culture - Blood (collected 31 Mar 2025 17:45)  Source: Blood Blood-Peripheral  Preliminary Report (02 Apr 2025 23:09):    No growth at 48 Hours    Culture - Blood (collected 31 Mar 2025 17:40)  Source: Blood Blood-Peripheral  Preliminary Report (02 Apr 2025 23:09):    No growth at 48 Hours        RADIOLOGY & ADDITIONAL TESTS:  Results Reviewed:   Imaging Personally Reviewed:  Electrocardiogram Personally Reviewed:    COORDINATION OF CARE:  Care Discussed with Consultants/Other Providers [Y/N]:  Prior or Outpatient Records Reviewed [Y/N]:  
[facility] Division of Hospital Medicine  Edie Farfan MD  Pager (M-F, 8A-5P): [phone]  Other Times:  [phone]    Patient is a 84y old  Female who presents with a chief complaint of fall (31 Mar 2025 23:58)      SUBJECTIVE / OVERNIGHT EVENTS:  arousable . c/o lower back pain when moving from fall. denies any other specific complaints.     ADDITIONAL REVIEW OF SYSTEMS: otherwise neg    MEDICATIONS  (STANDING):  albuterol/ipratropium for Nebulization 3 milliLiter(s) Nebulizer every 6 hours  enoxaparin Injectable 40 milliGRAM(s) SubCutaneous every 24 hours  fluticasone propionate/ salmeterol 250-50 MICROgram(s) Diskus 1 Dose(s) Inhalation two times a day  losartan 50 milliGRAM(s) Oral daily  pantoprazole    Tablet 40 milliGRAM(s) Oral before breakfast  piperacillin/tazobactam IVPB.. 3.375 Gram(s) IV Intermittent every 8 hours  sodium chloride 0.9%. 1000 milliLiter(s) (100 mL/Hr) IV Continuous <Continuous>    MEDICATIONS  (PRN):  acetaminophen     Tablet .. 650 milliGRAM(s) Oral every 6 hours PRN Temp greater or equal to 38C (100.4F), Mild Pain (1 - 3)  aluminum hydroxide/magnesium hydroxide/simethicone Suspension 30 milliLiter(s) Oral every 4 hours PRN Dyspepsia  melatonin 3 milliGRAM(s) Oral at bedtime PRN Insomnia  ondansetron Injectable 4 milliGRAM(s) IV Push every 8 hours PRN Nausea and/or Vomiting      CAPILLARY BLOOD GLUCOSE        I&O's Summary      PHYSICAL EXAM:  Vital Signs Last 24 Hrs  T(C): 36.9 (01 Apr 2025 05:00), Max: 37.6 (31 Mar 2025 17:11)  T(F): 98.4 (01 Apr 2025 05:00), Max: 99.7 (31 Mar 2025 17:11)  HR: 119 (01 Apr 2025 05:00) (116 - 130)  BP: 159/74 (01 Apr 2025 05:00) (120/60 - 159/74)  BP(mean): --  RR: 19 (01 Apr 2025 05:00) (19 - 28)  SpO2: 98% (01 Apr 2025 05:00) (94% - 98%)    Parameters below as of 01 Apr 2025 05:00  Patient On (Oxygen Delivery Method): nasal cannula  O2 Flow (L/min): 2      CONSTITUTIONAL: NAD, well-groomed  EYES:  conjunctiva and sclera clear  ENMT: Moist oral mucosa  NECK: Supple, no palpable masses; no JVD  RESPIRATORY: Normal respiratory effort; lungs are clear to auscultation bilaterally  CARDIOVASCULAR: Regular rate and rhythm, normal S1 and S2, no murmur/rub/gallop; trace lower extremity edema  ABDOMEN: Nontender to palpation, normoactive bowel sounds, no rebound/guarding  MUSCULOSKELETAL:  no clubbing or cyanosis of digits; no joint swelling or tenderness to palpation  PSYCH: A+O to person, place, and time; affect appropriate  SKIN: No rashes; no palpable lesions    LABS:                        9.3    39.81 )-----------( 317      ( 01 Apr 2025 08:08 )             31.4     04-01    138  |  104  |  14  ----------------------------<  118[H]  4.0   |  20[L]  |  0.83    Ca    8.1[L]      01 Apr 2025 08:08    TPro  7.2  /  Alb  4.0  /  TBili  1.3[H]  /  DBili  x   /  AST  56[H]  /  ALT  25  /  AlkPhos  87  03-31    PT/INR - ( 01 Apr 2025 08:08 )   PT: 18.8 sec;   INR: 1.64 ratio         PTT - ( 31 Mar 2025 17:57 )  PTT:34.9 sec      Urinalysis Basic - ( 01 Apr 2025 08:08 )    Color: x / Appearance: x / SG: x / pH: x  Gluc: 118 mg/dL / Ketone: x  / Bili: x / Urobili: x   Blood: x / Protein: x / Nitrite: x   Leuk Esterase: x / RBC: x / WBC x   Sq Epi: x / Non Sq Epi: x / Bacteria: x          RADIOLOGY & ADDITIONAL TESTS:  Results Reviewed:   Imaging Personally Reviewed:  Electrocardiogram Personally Reviewed:    COORDINATION OF CARE:  Care Discussed with Consultants/Other Providers [Y/N]:  Prior or Outpatient Records Reviewed [Y/N]:  
[facility] Division of Hospital Medicine  Edie Farfan MD  Pager (M-F, 3R-9V): [phone]  Other Times:  [phone]    Patient is a 84y old  Female who presents with a chief complaint of fall (04 Apr 2025 09:57)      SUBJECTIVE / OVERNIGHT EVENTS:  feeling same. some back pain. no overnight events  afebrile     ADDITIONAL REVIEW OF SYSTEMS: otherwise neg    MEDICATIONS  (STANDING):  albuterol/ipratropium for Nebulization 3 milliLiter(s) Nebulizer every 6 hours  enoxaparin Injectable 40 milliGRAM(s) SubCutaneous every 24 hours  fluticasone propionate/ salmeterol 250-50 MICROgram(s) Diskus 1 Dose(s) Inhalation two times a day  gabapentin 100 milliGRAM(s) Oral two times a day  lidocaine   4% Patch 1 Patch Transdermal daily  losartan 50 milliGRAM(s) Oral daily  pantoprazole    Tablet 40 milliGRAM(s) Oral before breakfast  piperacillin/tazobactam IVPB.. 3.375 Gram(s) IV Intermittent every 8 hours  polyethylene glycol 3350 17 Gram(s) Oral daily  rosuvastatin 40 milliGRAM(s) Oral at bedtime  senna 2 Tablet(s) Oral at bedtime  sodium chloride 0.9%. 1000 milliLiter(s) (100 mL/Hr) IV Continuous <Continuous>    MEDICATIONS  (PRN):  acetaminophen     Tablet .. 650 milliGRAM(s) Oral every 6 hours PRN Temp greater or equal to 38C (100.4F), Mild Pain (1 - 3)  aluminum hydroxide/magnesium hydroxide/simethicone Suspension 30 milliLiter(s) Oral every 4 hours PRN Dyspepsia  guaiFENesin Oral Liquid (Sugar-Free) 100 milliGRAM(s) Oral every 6 hours PRN Cough  melatonin 3 milliGRAM(s) Oral at bedtime PRN Insomnia  ondansetron Injectable 4 milliGRAM(s) IV Push every 8 hours PRN Nausea and/or Vomiting  promethazine 25 milliGRAM(s) Oral every 6 hours PRN allergics  traMADol 50 milliGRAM(s) Oral every 8 hours PRN Moderate Pain (4 - 6)      CAPILLARY BLOOD GLUCOSE        I&O's Summary    03 Apr 2025 07:01  -  04 Apr 2025 07:00  --------------------------------------------------------  IN: 0 mL / OUT: 1950 mL / NET: -1950 mL    04 Apr 2025 07:01  -  04 Apr 2025 12:43  --------------------------------------------------------  IN: 0 mL / OUT: 700 mL / NET: -700 mL        PHYSICAL EXAM:  Vital Signs Last 24 Hrs  T(C): 36.6 (04 Apr 2025 12:11), Max: 37.8 (03 Apr 2025 20:51)  T(F): 97.9 (04 Apr 2025 12:11), Max: 100 (03 Apr 2025 20:51)  HR: 98 (04 Apr 2025 12:11) (98 - 110)  BP: 153/64 (04 Apr 2025 12:11) (148/66 - 173/77)  BP(mean): --  RR: 18 (04 Apr 2025 12:11) (18 - 18)  SpO2: 97% (04 Apr 2025 12:11) (95% - 97%)    Parameters below as of 04 Apr 2025 12:11  Patient On (Oxygen Delivery Method): nasal cannula  O2 Flow (L/min): 3      CONSTITUTIONAL: NAD, obese  EYES:  conjunctiva and sclera clear  ENMT: Moist oral mucosa  NECK: Supple, no palpable masses; no JVD  RESPIRATORY: Normal respiratory effort; lungs are clear to auscultation bilaterally  CARDIOVASCULAR: tachycardic  normal S1 and S2, no murmur/rub/gallop; trace lower extremity edema  ABDOMEN: Nontender to palpation, normoactive bowel sounds, no rebound/guarding  MUSCULOSKELETAL:  no clubbing or cyanosis of digits; no joint swelling or tenderness to palpation  PSYCH: A+O to person, place, and time; affect appropriate  SKIN: No rashes; no palpable lesions    LABS:                        8.9    18.86 )-----------( 394      ( 04 Apr 2025 06:29 )             29.5     04-03    138  |  101  |  13  ----------------------------<  97  3.8   |  24  |  0.82    Ca    8.3[L]      03 Apr 2025 06:44            Urinalysis Basic - ( 03 Apr 2025 06:44 )    Color: x / Appearance: x / SG: x / pH: x  Gluc: 97 mg/dL / Ketone: x  / Bili: x / Urobili: x   Blood: x / Protein: x / Nitrite: x   Leuk Esterase: x / RBC: x / WBC x   Sq Epi: x / Non Sq Epi: x / Bacteria: x          RADIOLOGY & ADDITIONAL TESTS:  Results Reviewed:   Imaging Personally Reviewed:  Electrocardiogram Personally Reviewed:    COORDINATION OF CARE:  Care Discussed with Consultants/Other Providers [Y/N]:  Prior or Outpatient Records Reviewed [Y/N]:  
[facility] Division of Hospital Medicine  Edie Farfan MD  Pager (ANEL-F, 2K-5P): [phone]  Other Times:  [phone]    Patient is a 84y old  Female who presents with a chief complaint of fall (01 Apr 2025 13:22)      SUBJECTIVE / OVERNIGHT EVENTS:  feeling same from yesterday. still with lower back pain. unchanged. no changes to breathing. afebrile.     ADDITIONAL REVIEW OF SYSTEMS: otherwise neg    MEDICATIONS  (STANDING):  albuterol/ipratropium for Nebulization 3 milliLiter(s) Nebulizer every 6 hours  enoxaparin Injectable 40 milliGRAM(s) SubCutaneous every 24 hours  fluticasone propionate/ salmeterol 250-50 MICROgram(s) Diskus 1 Dose(s) Inhalation two times a day  gabapentin 100 milliGRAM(s) Oral two times a day  lidocaine   4% Patch 1 Patch Transdermal daily  losartan 50 milliGRAM(s) Oral daily  pantoprazole    Tablet 40 milliGRAM(s) Oral before breakfast  piperacillin/tazobactam IVPB.. 3.375 Gram(s) IV Intermittent every 8 hours  rosuvastatin 40 milliGRAM(s) Oral at bedtime  sodium chloride 0.9%. 1000 milliLiter(s) (100 mL/Hr) IV Continuous <Continuous>    MEDICATIONS  (PRN):  acetaminophen     Tablet .. 650 milliGRAM(s) Oral every 6 hours PRN Temp greater or equal to 38C (100.4F), Mild Pain (1 - 3)  aluminum hydroxide/magnesium hydroxide/simethicone Suspension 30 milliLiter(s) Oral every 4 hours PRN Dyspepsia  melatonin 3 milliGRAM(s) Oral at bedtime PRN Insomnia  ondansetron Injectable 4 milliGRAM(s) IV Push every 8 hours PRN Nausea and/or Vomiting  promethazine 25 milliGRAM(s) Oral every 6 hours PRN allergics  traMADol 50 milliGRAM(s) Oral every 8 hours PRN Moderate Pain (4 - 6)      CAPILLARY BLOOD GLUCOSE        I&O's Summary    01 Apr 2025 07:01  -  02 Apr 2025 07:00  --------------------------------------------------------  IN: 0 mL / OUT: 1200 mL / NET: -1200 mL        PHYSICAL EXAM:  Vital Signs Last 24 Hrs  T(C): 36.6 (02 Apr 2025 11:29), Max: 36.9 (01 Apr 2025 20:43)  T(F): 97.9 (02 Apr 2025 11:29), Max: 98.4 (01 Apr 2025 20:43)  HR: 108 (02 Apr 2025 11:29) (106 - 116)  BP: 106/61 (02 Apr 2025 11:29) (101/50 - 128/96)  BP(mean): --  RR: 18 (02 Apr 2025 11:29) (18 - 18)  SpO2: 94% (02 Apr 2025 11:29) (91% - 99%)    Parameters below as of 02 Apr 2025 11:29  Patient On (Oxygen Delivery Method): nasal cannula  O2 Flow (L/min): 3      CONSTITUTIONAL: NAD, obese  EYES:  conjunctiva and sclera clear  ENMT: Moist oral mucosa  NECK: Supple, no palpable masses; no JVD  RESPIRATORY: Normal respiratory effort; lungs are clear to auscultation bilaterally  CARDIOVASCULAR: tachycardic  normal S1 and S2, no murmur/rub/gallop; trace lower extremity edema  ABDOMEN: Nontender to palpation, normoactive bowel sounds, no rebound/guarding  MUSCULOSKELETAL:  no clubbing or cyanosis of digits; no joint swelling or tenderness to palpation  PSYCH: A+O to person, place, and time; affect appropriate  SKIN: No rashes; no palpable lesions    LABS:                        8.8    22.07 )-----------( 319      ( 02 Apr 2025 09:22 )             29.7     04-02    138  |  101  |  14  ----------------------------<  115[H]  3.7   |  24  |  0.83    Ca    8.3[L]      02 Apr 2025 09:22    TPro  6.0  /  Alb  3.0[L]  /  TBili  0.5  /  DBili  x   /  AST  27  /  ALT  20  /  AlkPhos  88  04-02    PT/INR - ( 01 Apr 2025 08:08 )   PT: 18.8 sec;   INR: 1.64 ratio         PTT - ( 31 Mar 2025 17:57 )  PTT:34.9 sec      Urinalysis Basic - ( 02 Apr 2025 09:22 )    Color: x / Appearance: x / SG: x / pH: x  Gluc: 115 mg/dL / Ketone: x  / Bili: x / Urobili: x   Blood: x / Protein: x / Nitrite: x   Leuk Esterase: x / RBC: x / WBC x   Sq Epi: x / Non Sq Epi: x / Bacteria: x        Culture - Urine (collected 31 Mar 2025 21:09)  Source: Clean Catch Clean Catch (Midstream)  Final Report (01 Apr 2025 23:20):    <10,000 CFU/mL Normal Urogenital Meg    Culture - Blood (collected 31 Mar 2025 17:45)  Source: Blood Blood-Peripheral  Preliminary Report (01 Apr 2025 23:02):    No growth at 24 hours    Culture - Blood (collected 31 Mar 2025 17:40)  Source: Blood Blood-Peripheral  Preliminary Report (01 Apr 2025 23:02):    No growth at 24 hours        RADIOLOGY & ADDITIONAL TESTS:  Results Reviewed:   Imaging Personally Reviewed:  Electrocardiogram Personally Reviewed:    COORDINATION OF CARE:  Care Discussed with Consultants/Other Providers [Y/N]:  Prior or Outpatient Records Reviewed [Y/N]:

## 2025-04-04 NOTE — H&P ADULT - PROBLEM SELECTOR PLAN 3
WBC 18 (was 65 on admission)  - infectious workup unrevealing - BCx/UA/CXR neg, QuantiFERON-TB Gold, fungal and viral serology pending  - ID evaluated at [facility] zosyn x 7 days, rec flow cytometry, AFB, bacterial and fungal cx to be sent with biopsy  - trend WBC

## 2025-04-04 NOTE — H&P ADULT - NSHPLABSRESULTS_GEN_ALL_CORE
8.9    18.86 )-----------( 394      ( 04 Apr 2025 06:29 )             29.5     04-03    138  |  101  |  13  ----------------------------<  97  3.8   |  24  |  0.82    Ca    8.3[L]      03 Apr 2025 06:44

## 2025-04-04 NOTE — H&P ADULT - NSHPPHYSICALEXAM_GEN_ALL_CORE
Vital Signs Last 24 Hrs  T(C): 37.1 (04 Apr 2025 14:35), Max: 37.8 (03 Apr 2025 20:51)  T(F): 98.7 (04 Apr 2025 14:35), Max: 100 (03 Apr 2025 20:51)  HR: 98 (04 Apr 2025 14:35) (98 - 110)  BP: 157/59 (04 Apr 2025 14:35) (148/66 - 173/77)  BP(mean): --  RR: 18 (04 Apr 2025 14:35) (18 - 18)  SpO2: 94% (04 Apr 2025 14:35) (94% - 97%)    Parameters below as of 04 Apr 2025 14:35  Patient On (Oxygen Delivery Method): room air        CONSTITUTIONAL: resting in bed comfortably   EYES: no conjunctival or scleral injection, non-icteric, PERRLA  ENMT: no external nasal lesions, oral mucosa with moist membranes  NECK: trachea midline, no palpable neck mass   RESPIRATORY: breathing comfortably, lungs CTA without wheeze/rales/rhonchi  CARDIOVASCULAR: regular rate and rhythm; +S1S2, no murmurs, rubs, or gallops, no lower extremity edema, 2+ peripheral pulses  GASTROINTESTINAL: soft, nontender, nondistended; +BS throughout, no rebound/guarding  MUSCULOSKELETAL: no joint effusions, normal strength and tone of extremities   NEUROLOGIC: non-focal, sensation intact to light touch in b/l upper and lower extremities   PSYCHIATRIC: AAOx3, appropriate mood and affect  SKIN: no rashes or lesions, warm

## 2025-04-04 NOTE — PROGRESS NOTE ADULT - PROBLEM SELECTOR PLAN 3
seen with new lung nodule 2.4cm.   per discussion with pt - reports follow with Dr. Eber costa as outpt and told recently about this lesion.   Was supposed to get CT as outpt   Will consider pulm eval and possible biopsy when pt is stable.
seen with LLL lung nodule 2.4cm.   per discussion with pt /daughter - reports follow with Dr. Eber costa as outpt  and has been followed.   Was supposed to get surveillence CT as outpt   IR unable to obtain percutaneous biopsy.   Will ask pulm eval for biopsy of lung lesion.
seen with LLL lung nodule 2.4cm.   per discussion with pt /daughter - reports follow with Dr. Eber costa as outpt  and has been followed.   Was supposed to get surveillence CT as outpt   IR unable to obtain percutaneous biopsy.   Pulrani sanchez appreciated - plan for inpt lung biopsy with interventional pulm at [facility].   accepted for transfer
seen with LLL lung nodule 2.4cm.   per discussion with pt /daughter - reports follow with Dr. Eber costa as outpt  and has been followed.   Was supposed to get surveillence CT as outpt   Will consider IR vs pulm eval for biopsy.

## 2025-04-04 NOTE — H&P ADULT - ASSESSMENT
84F with hx of TAVR (2019), COPD (on PRN O2), HLD, S1 Fracture (02/2025 no surgical intervention) initially admitted to [facility] 3/31-4/4 for fever, found to have new LLL mass like consolidation, c/f malignancy, infectious workup negative, transferred to [facility] for bronchoscopy with interventional pulmonology.

## 2025-04-04 NOTE — DISCHARGE NOTE PROVIDER - NSDCMRMEDTOKEN_GEN_ALL_CORE_FT
albuterol 1.25 mg/3 mL (0.042%) inhalation solution: 3 milliliter(s) by nebulizer 2 times a day  Breztri Aerosphere 160 mcg-9 mcg-4.8 mcg/inh inhalation aerosol: 2 inhaled once a day  losartan 50 mg oral tablet: 1 tab(s) orally once a day  omeprazole 40 mg oral delayed release capsule: 1 cap(s) orally once a day  Spiriva 18 mcg inhalation capsule: 1 cap(s) inhaled once a day   acetaminophen 325 mg oral tablet: 2 tab(s) orally every 6 hours As needed Temp greater or equal to 38C (100.4F), Mild Pain (1 - 3)  fluticasone-salmeterol 250 mcg-50 mcg/inh inhalation powder: 1 puff(s) inhaled 2 times a day  gabapentin 100 mg oral capsule: 1 cap(s) orally 2 times a day  guaiFENesin 100 mg/5 mL oral liquid: 5 milliliter(s) orally every 6 hours As needed Cough  ipratropium-albuterol 0.5 mg-2.5 mg/3 mL inhalation solution: 3 milliliter(s) inhaled every 6 hours  lidocaine 4% topical film: Apply topically to affected area once a day  losartan 50 mg oral tablet: 1 tab(s) orally once a day  Lovenox 40 mg/0.4 mL injectable solution: 40 milligram(s) subcutaneously once a day  melatonin 3 mg oral tablet: 1 tab(s) orally once a day (at bedtime) As needed Insomnia  pantoprazole 40 mg oral delayed release tablet: 1 tab(s) orally once a day (before a meal)  polyethylene glycol 3350 oral powder for reconstitution: 17 gram(s) orally once a day  promethazine 25 mg oral tablet: 1 tab(s) orally every 6 hours As needed allergics  rosuvastatin 40 mg oral tablet: 1 tab(s) orally once a day (at bedtime)  senna leaf extract oral tablet: 2 tab(s) orally once a day (at bedtime)  traMADol 50 mg oral tablet: 1 tab(s) orally every 8 hours As needed Moderate Pain (4 - 6)  Zosyn 3 g-0.375 g/50 mL intravenous solution: 3.375 gram(s) intravenously every 8 hours x 7 days - started on 4/1/25

## 2025-04-04 NOTE — PROGRESS NOTE ADULT - PROVIDER SPECIALTY LIST ADULT
Internal Medicine
Pulmonology
Infectious Disease
Internal Medicine

## 2025-04-04 NOTE — H&P ADULT - PROBLEM SELECTOR PLAN 2
febrile on admission to [facility]  - infectious workup unrevealing - BCx/UA/CXR neg, QuantiFERON-TB Gold, fungal and viral serology pending  - CT chest with LLL lung nodule - management as below, CT CAP without infectious etiology   - ID (Dr. Bui) evaluated at [facility] - rec flow cytometry, AFB, bacterial and fungal cx to be sent with biopsy, discussed with Dr. Ramos, if any of these results come back positive can consult ID at [facility]  - continue Zosyn (planned for empiric 7 day course per ID at [facility]) - today is day 4/7

## 2025-04-04 NOTE — CHART NOTE - NSCHARTNOTEFT_GEN_A_CORE
Patient will undergo Navigational bronchoscopy on Monday 4/7/25  - Please make sure patient is NPO after midnight the night prior  - Obtain CBC, CMP, coags the morning of procedure   - Hold Lovenox morning of procedure

## 2025-04-04 NOTE — PROGRESS NOTE ADULT - ATTENDING COMMENTS
84F w/ PMH of TAVR in 2019, COPD w/ PRN O2, HTN presenting on 3/31 after a fall the night prior. SHe is a former heavy smoker who quit in 1984 and has COPD, currently on budesonide and albuterol at home.  Daughter reports that she has persistent cough and shortness of breath and no inhalers have really helped her.  Also with recent fall and hurt her back, and has limited ambulation and significant pain after that.  Being told she will need to go to rehab after her hospitalization.    Found to be febrile to 101 but no subjective sick symptoms. Also found to have leukocytosis, tachycardia. Admitted for sepsis work up.  On Chest CT imaging found to have a new  irregular 2.4 x 2.4 nodule in the LLL.  IN 2019 there was no nodule. Daughter states that Dr. Mcdonnell has been following an irregular nodule for the last few years. We do not have that imaging or reports.    Today she appears comfortable at rest.  Has bilateral wheezing which she states is chronic for her.  Afebrile    IMpression:  NOdule is suspicious for cancer. It is amenable to robotic bronchoscopy after review with our IP attendings.  Plan is to transfer her to [facility] hopefully today and procedure can be scheduled for early next week if the transfer occurs.  Agree with plan as outlined above.

## 2025-04-04 NOTE — PROGRESS NOTE ADULT - TIME BILLING
- Ordering, reviewing, and interpreting labs, testing, and imaging.  - Independently obtaining a review of systems and performing a physical exam  - Reviewing consultant documentation/recommendations in addition to discussing plan of care with consultants.  - Counselling and educating patient and family regarding interpretation of aforementioned items and plan of care.
Personal review of data, imaging and discussion with medical team.
- Ordering, reviewing, and interpreting labs, testing, and imaging.  - Independently obtaining a review of systems and performing a physical exam  - Reviewing consultant documentation/recommendations in addition to discussing plan of care with consultants.  - Counselling and educating patient and family regarding interpretation of aforementioned items and plan of care.

## 2025-04-04 NOTE — H&P ADULT - HISTORY OF PRESENT ILLNESS
84F with hx of TAVR (2019), COPD (on PRN O2), HLD, S1 Fracture (02/2025 no surgical intervention) initially admitted to [facility] 3/31-4/4 for fever, found to have new LL mass like consolidation, c/f malignancy, infectious workup negative, transferred to [facility] for bronchoscopy with interventional pulmonology. Patient denies fevers, chills, cp, sob, abdominal pain, n/v/d, dysuria, sick contacts, recent travel.     Infectious workup at [facility] - BCx, UA, CXR neg. QuantiFERON-TB Gold, fungal and viral serology pending. ID requests AFB, bacterial/fungal cultures, and flow cytometry of the biopsy specimen.

## 2025-04-04 NOTE — PROGRESS NOTE ADULT - PROBLEM SELECTOR PLAN 4
- No AC use, No head trauma, or LOC   - CXR: atraumatic   - CT spine with s1 age indeterminant fx   - PRN pain control   - PT consult   - Fall precaution
- No AC use, No head trauma, or LOC   - CXR: atraumatic   - CT spine with s1 age indeterminant fx   - PRN pain control   - PT consult - NIKHIL  - Fall precaution

## 2025-04-04 NOTE — DISCHARGE NOTE PROVIDER - PROVIDER TOKENS
PROVIDER:[TOKEN:[[id number]],FOLLOWUP:[1 week]],PROVIDER:[TOKEN:[[id number]],FOLLOWUP:[1 week]]

## 2025-04-04 NOTE — DISCHARGE NOTE PROVIDER - HOSPITAL COURSE
HPI:  84y F PMH TAVR (2019), HTN, COPD (oxygen as needed), HLD, S1 fracture (no surgical intervention, 2/2025) presents to ED s/p fall at 7 PM yesterday.  No head strike or LOC.  Not on AC.  Fell yesterday while walking to bathroom b/c leg weakness with her "legs giving out on her".  Also endorse fever Tmax 101. Patient at baseline mental status (A&O x 3).     ROS: Denies HA, CP, SOB, palpitation, N/V/D, fever, cough, chills, dizziness, abm pain, sick contact, change in bowel or urinary habits   A 10-system ROS was performed and is negative except as noted above and/or in the HPI.   (31 Mar 2025 23:58)    Hospital Course:  Imaging revealed a new LLL mass-like consolidation, concerning for malignancy given reported growth compared to prior outside imaging. Patient also had a left adrenal nodule and periaortic LAD. Interventional pulmonology was consulted; plan is for transfer to [facility] for robotic navigational bronchoscopy and biopsy. In the interim, patient has been started on Zosyn. Initial workup for infection, including blood cultures, UA, and CXR, are negative. QuantiFERON-TB Gold, fungal and viral serology pending. ID requests AFB, bacterial/fungal cultures, and flow cytometry of the biopsy specimen.   Continuation of care to be resumed at [facility].      Important Medication Changes and Reason:    Active or Pending Issues Requiring Follow-up:  - Patient currently follows with Dr. Eber Mcdonnell ([facility]- Pulmonary)      Advanced Directives:   [ ] Full code  [ ] DNR  [ ] Hospice    Discharge Diagnoses:         HPI:  84y F PMH TAVR (2019), HTN, COPD (oxygen as needed), HLD, S1 fracture (no surgical intervention, 2/2025) presents to ED s/p fall at 7 PM yesterday.  No head strike or LOC.  Not on AC.  Fell yesterday while walking to bathroom b/c leg weakness with her "legs giving out on her".  Also endorse fever Tmax 101. Patient at baseline mental status (A&O x 3).     ROS: Denies HA, CP, SOB, palpitation, N/V/D, fever, cough, chills, dizziness, abm pain, sick contact, change in bowel or urinary habits   A 10-system ROS was performed and is negative except as noted above and/or in the HPI.   (31 Mar 2025 23:58)    Hospital Course:  Imaging revealed a new LLL mass-like consolidation, concerning for malignancy given reported growth compared to prior outside imaging. Patient also had a left adrenal nodule and periaortic LAD. Interventional pulmonology was consulted; plan is for transfer to [facility] for robotic navigational bronchoscopy and biopsy. In the interim, patient has been started on Zosyn. Initial workup for infection, including blood cultures, UA, and CXR, are negative. QuantiFERON-TB Gold, fungal and viral serology pending. ID requests AFB, bacterial/fungal cultures, and flow cytometry of the biopsy specimen.   Continuation of care to be resumed at [facility].      Important Medication Changes and Reason:    Active or Pending Issues Requiring Follow-up:  - Patient currently follows with Dr. Eber Mcdonnell ([facility]- Pulmonary)      Advanced Directives:   [ ] Full code  [ ] DNR  [ ] Hospice    Discharge Diagnoses:   new LLL mass-like consolidation,

## 2025-04-04 NOTE — PROGRESS NOTE ADULT - PROBLEM SELECTOR PLAN 1
So far infectious w/u unrevealing.   CT c/a/p 4/1 without evidence of acute infectious findings.   CLinically no acute s+s . chronic respiratory issues with COPD unchanged per pt.    BCx so far NGTD   currently on emperic zosyn.   ID daniel oh. plan for lung biopsy - will need additional infectious testing with biopsy  Plan to complete 7 days of zosyn D4/7
No clear source at this time.  CT C/A/P pending. f/u Cx   - Febrile (at home) + tachycardic + Tachypneic + elevated wbc + possible pulm as infectious source meeting sepsis criteria   - EKG: sinus tach   - Lab: elevated wbc ,  Flu/RSV/COVID (-)   - CXR: left retrocardiac opacity   - ED: Vanco, Zosyn, IVF    - C/w IV zosyn, will add vanco as indicated pending MRSA swab  - C/w maintenance IVF  for sepsis fluid resuscitation  - Check expanded RVP, Ur Legionella, Ur Mycoplasma, S. Pneumo, H. flu  - F/u Bcx, Ucx   - Trend labs, monitor clinically
So far infectious w/u unrevealing.   CT c/a/p 4/1 without evidence of acute infectious findings.   CLinically no acute s+s . chronic respiratory issues with COPD unchanged per pt.   Awaiting BCx so far NGTD   currently on emperic zosyn.   ID daniel appreciated. plan for possible biopsy - will need additional infectious testing with biopsy
So far infectious w/u unrevealing.   CT c/a/p 4/1 without evidence of acute infectious findings.   CLinically no acute s+s . chronic respiratory issues with COPD unchanged per pt.   Awaiting BCx so far NGTD   currently on emperic zosyn.   ID daniel called.

## 2025-04-04 NOTE — PROGRESS NOTE ADULT - ASSESSMENT
84F w/ PMH of TAVR in 2019, COPD w/ PRN O2, HTN presenting on 3/31 after a fall the night prior.  Found to be febrile to 101 but no subjective sick symptoms. Also found to have leukocytosis, tachycardia. Admitted for sepsis work up.  On imaging found to have a new LLL mass-like consolidation.    # LLL nodule  Patient reportedly w/ Dr Mcdonnell as an outpatient for a LLL nodule. Prior imaging not available at this time but appears it has grown in size. Also has a L adrenal nodule and some periarotic LAD.  Findings concerning for malignancy given reported progression in size.  Discussed findings with interventional pulmonology. Patient would require robotic navigational bronchoscopy given location of the nodule.    INCOMPLETE NOTE 84F w/ PMH of TAVR in 2019, COPD w/ PRN O2, HTN presenting on 3/31 after a fall the night prior.  Found to be febrile to 101 but no subjective sick symptoms. Also found to have leukocytosis, tachycardia. Admitted for sepsis work up.  On imaging found to have a new LLL mass-like consolidation.    # LLL nodule  Patient reportedly w/ Dr Mcdonnell as an outpatient for a LLL nodule. Prior imaging not available at this time but appears it has grown in size. Also has a L adrenal nodule and some periarotic LAD.  Findings concerning for malignancy given reported progression in size.  Discussed findings with interventional pulmonology. Patient would require robotic navigational bronchoscopy given location of the nodule.  After discussion with primary team, IP, family, plan was made to transfer patient to [facility] as soon as feasible to facilitate navigational bronchoscopy at [facility].  Ideally, patient would leave today in time to be put on the OR schedule for next week today

## 2025-04-04 NOTE — DISCHARGE NOTE PROVIDER - CARE PROVIDER_API CALL
Poli Billingsunato  Internal Medicine  [address], [address]  [place] [address]  Phone: [phone]  Fax: [phone]  Follow Up Time: 1 week    Eber Mcdonnell  Pulmonary Disease  [address] [place] [id number]  Phone: [phone]  Fax: [phone]  Follow Up Time: 1 week

## 2025-04-04 NOTE — PROGRESS NOTE ADULT - PROBLEM SELECTOR PLAN 2
- WBC cont to downtrend   - CT chest: LLL 2.4cm nodule c/f lung neoplasm   - Eval'd by heme-onc:  Suspect leukocytosis is leukemoid reaction I/S/O infection. Low suspicion for acute leukemia.  flow cyto pending
- WBC 65K > 45K  > 38  - CT chest: LLL 2.4cm nodule c/f lung neoplasm   - Eval'd by heme-onc:  Suspect leukocytosis is leukemoid reaction I/S/O infection. Low suspicion for acute leukemia. Rec Obtain LDH, Uric acid, fibrinogen clauss, sepsis work up, abx, and malignancy work up with CT C/A/P w CTX   - LDH: 328 (elevated iso infection), Uric acid 5.6 ( wnl), Fibrinogen clauss pending   - F/u CT CAP w/ CTX  (ordered)   - Check expanded RVP(Lab called), Ur Legionella, Ur Mycoplasma, S. Pneumo, H. flu  - Trend labs
- WBC cont to downtrend   - CT chest: LLL 2.4cm nodule c/f lung neoplasm   - Eval'd by heme-onc:  Suspect leukocytosis is leukemoid reaction I/S/O infection. Low suspicion for acute leukemia.  flow cyto pending
- WBC cont to downtrend   - CT chest: LLL 2.4cm nodule c/f lung neoplasm   - Eval'd by heme-onc:  Suspect leukocytosis is leukemoid reaction I/S/O infection. Low suspicion for acute leukemia.  flow cyto

## 2025-04-05 PROBLEM — Z95.2 PRESENCE OF PROSTHETIC HEART VALVE: Chronic | Status: ACTIVE | Noted: 2025-04-01

## 2025-04-05 PROBLEM — I10 ESSENTIAL (PRIMARY) HYPERTENSION: Chronic | Status: ACTIVE | Noted: 2025-04-01

## 2025-04-05 PROBLEM — J44.9 CHRONIC OBSTRUCTIVE PULMONARY DISEASE, UNSPECIFIED: Chronic | Status: ACTIVE | Noted: 2025-04-01

## 2025-04-05 PROBLEM — E78.5 HYPERLIPIDEMIA, UNSPECIFIED: Chronic | Status: ACTIVE | Noted: 2025-04-01

## 2025-04-05 LAB
ANION GAP SERPL CALC-SCNC: 14 MMOL/L — SIGNIFICANT CHANGE UP (ref 7–14)
BUN SERPL-MCNC: 8 MG/DL — SIGNIFICANT CHANGE UP (ref 7–23)
CALCIUM SERPL-MCNC: 8.5 MG/DL — SIGNIFICANT CHANGE UP (ref 8.4–10.5)
CHLORIDE SERPL-SCNC: 100 MMOL/L — SIGNIFICANT CHANGE UP (ref 98–107)
CO2 SERPL-SCNC: 24 MMOL/L — SIGNIFICANT CHANGE UP (ref 22–31)
CREAT SERPL-MCNC: 0.72 MG/DL — SIGNIFICANT CHANGE UP (ref 0.5–1.3)
CULTURE RESULTS: SIGNIFICANT CHANGE UP
CULTURE RESULTS: SIGNIFICANT CHANGE UP
EGFR: 82 ML/MIN/1.73M2 — SIGNIFICANT CHANGE UP
EGFR: 82 ML/MIN/1.73M2 — SIGNIFICANT CHANGE UP
GLUCOSE SERPL-MCNC: 111 MG/DL — HIGH (ref 70–99)
HCT VFR BLD CALC: 28.7 % — LOW (ref 34.5–45)
HGB BLD-MCNC: 8.7 G/DL — LOW (ref 11.5–15.5)
MAGNESIUM SERPL-MCNC: 2.1 MG/DL — SIGNIFICANT CHANGE UP (ref 1.6–2.6)
MCHC RBC-ENTMCNC: 16.8 PG — LOW (ref 27–34)
MCHC RBC-ENTMCNC: 30.3 G/DL — LOW (ref 32–36)
MCV RBC AUTO: 55.4 FL — LOW (ref 80–100)
NRBC # BLD AUTO: 0 K/UL — SIGNIFICANT CHANGE UP (ref 0–0)
NRBC # FLD: 0 K/UL — SIGNIFICANT CHANGE UP (ref 0–0)
NRBC BLD AUTO-RTO: 0 /100 WBCS — SIGNIFICANT CHANGE UP (ref 0–0)
PHOSPHATE SERPL-MCNC: 3.1 MG/DL — SIGNIFICANT CHANGE UP (ref 2.5–4.5)
PLATELET # BLD AUTO: 394 K/UL — SIGNIFICANT CHANGE UP (ref 150–400)
POTASSIUM SERPL-MCNC: 3.4 MMOL/L — LOW (ref 3.5–5.3)
POTASSIUM SERPL-SCNC: 3.4 MMOL/L — LOW (ref 3.5–5.3)
RBC # BLD: 5.18 M/UL — SIGNIFICANT CHANGE UP (ref 3.8–5.2)
RBC # FLD: 20.4 % — HIGH (ref 10.3–14.5)
SODIUM SERPL-SCNC: 138 MMOL/L — SIGNIFICANT CHANGE UP (ref 135–145)
SPECIMEN SOURCE: SIGNIFICANT CHANGE UP
SPECIMEN SOURCE: SIGNIFICANT CHANGE UP
WBC # BLD: 16.67 K/UL — HIGH (ref 3.8–10.5)
WBC # FLD AUTO: 16.67 K/UL — HIGH (ref 3.8–10.5)

## 2025-04-05 PROCEDURE — 99233 SBSQ HOSP IP/OBS HIGH 50: CPT

## 2025-04-05 RX ADMIN — Medication 25 GRAM(S): at 13:39

## 2025-04-05 RX ADMIN — ENOXAPARIN SODIUM 40 MILLIGRAM(S): 100 INJECTION SUBCUTANEOUS at 06:22

## 2025-04-05 RX ADMIN — IPRATROPIUM BROMIDE AND ALBUTEROL SULFATE 3 MILLILITER(S): .5; 2.5 SOLUTION RESPIRATORY (INHALATION) at 16:07

## 2025-04-05 RX ADMIN — ROSUVASTATIN CALCIUM 40 MILLIGRAM(S): 20 TABLET, FILM COATED ORAL at 22:16

## 2025-04-05 RX ADMIN — Medication 40 MILLIGRAM(S): at 06:25

## 2025-04-05 RX ADMIN — GABAPENTIN 100 MILLIGRAM(S): 400 CAPSULE ORAL at 06:25

## 2025-04-05 RX ADMIN — LOSARTAN POTASSIUM 50 MILLIGRAM(S): 100 TABLET, FILM COATED ORAL at 06:25

## 2025-04-05 RX ADMIN — DEXTROMETHORPHAN HBR, GUAIFENESIN 100 MILLIGRAM(S): 200 LIQUID ORAL at 06:22

## 2025-04-05 RX ADMIN — Medication 2 TABLET(S): at 22:17

## 2025-04-05 RX ADMIN — IPRATROPIUM BROMIDE AND ALBUTEROL SULFATE 3 MILLILITER(S): .5; 2.5 SOLUTION RESPIRATORY (INHALATION) at 22:46

## 2025-04-05 RX ADMIN — IPRATROPIUM BROMIDE AND ALBUTEROL SULFATE 3 MILLILITER(S): .5; 2.5 SOLUTION RESPIRATORY (INHALATION) at 10:47

## 2025-04-05 RX ADMIN — PROMETHAZINE HYDROCHLORIDE 25 MILLIGRAM(S): 25 INJECTION INTRAMUSCULAR; INTRAVENOUS at 22:34

## 2025-04-05 RX ADMIN — Medication 650 MILLIGRAM(S): at 23:22

## 2025-04-05 RX ADMIN — IPRATROPIUM BROMIDE AND ALBUTEROL SULFATE 3 MILLILITER(S): .5; 2.5 SOLUTION RESPIRATORY (INHALATION) at 03:17

## 2025-04-05 RX ADMIN — GABAPENTIN 100 MILLIGRAM(S): 400 CAPSULE ORAL at 17:52

## 2025-04-05 RX ADMIN — ROSUVASTATIN CALCIUM 40 MILLIGRAM(S): 20 TABLET, FILM COATED ORAL at 00:13

## 2025-04-05 RX ADMIN — Medication 25 GRAM(S): at 22:17

## 2025-04-05 RX ADMIN — Medication 40 MILLIEQUIVALENT(S): at 22:17

## 2025-04-05 RX ADMIN — Medication 40 MILLIEQUIVALENT(S): at 13:39

## 2025-04-05 RX ADMIN — Medication 1 DOSE(S): at 09:16

## 2025-04-05 RX ADMIN — TRAMADOL HYDROCHLORIDE 50 MILLIGRAM(S): 50 TABLET, FILM COATED ORAL at 14:21

## 2025-04-05 RX ADMIN — Medication 1 DOSE(S): at 22:16

## 2025-04-05 RX ADMIN — Medication 25 GRAM(S): at 06:21

## 2025-04-05 RX ADMIN — Medication 650 MILLIGRAM(S): at 22:17

## 2025-04-05 RX ADMIN — ENOXAPARIN SODIUM 40 MILLIGRAM(S): 100 INJECTION SUBCUTANEOUS at 17:52

## 2025-04-05 RX ADMIN — TRAMADOL HYDROCHLORIDE 50 MILLIGRAM(S): 50 TABLET, FILM COATED ORAL at 13:41

## 2025-04-05 NOTE — PHYSICAL THERAPY INITIAL EVALUATION ADULT - ADDITIONAL COMMENTS
Pt lives alone in an apartment with 5 steps to enter. Pt uses a rolling walker and was independent with ADLs, but reports recent difficulty with ADLs. Pt reports 2 falls in the past 6 months.  Pt left semi supine in bed in NAD, all lines intact, call bell in reach and MAGNOLIA Anaya made aware.

## 2025-04-05 NOTE — PROGRESS NOTE ADULT - PROBLEM SELECTOR PLAN 3
WBC 18 (was 65 on admission)  - infectious workup unrevealing - BCx/UA/CXR neg, QuantiFERON-TB Gold, fungal and viral serology pending  - ID evaluated at [facility] zosyn x 7 days, rec flow cytometry, AFB, bacterial and fungal cx to be sent with biopsy  - trend WBC

## 2025-04-05 NOTE — DIETITIAN INITIAL EVALUATION ADULT - PROBLEM SELECTOR PLAN 3
WBC 18 (was 65 on admission)  - infectious workup unrevealing - BCx/UA/CXR neg, QuantiFERON-TB Gold, fungal and viral serology pending  - ID evaluated at [facility] zosyn x 7 days, rec flow cytometry, AFB, bacterial and fungal cx to be sent with biopsy  - trend WBC

## 2025-04-05 NOTE — PROGRESS NOTE ADULT - SUBJECTIVE AND OBJECTIVE BOX
[facility] Division of Hospital Medicine  Quincy Smith MD  Pager [phone]      Patient is a 84y old  Female who presents with a chief complaint of     SUBJECTIVE / OVERNIGHT EVENTS:    no acute event o/n. no new complaints. denies sob     ADDITIONAL REVIEW OF SYSTEMS:    RESPIRATORY: No cough, wheezing, chills or hemoptysis; No shortness of breath  CARDIOVASCULAR: No chest pain, palpitations, dizziness, or leg swelling  GASTROINTESTINAL: No abdominal or epigastric pain. No nausea, vomiting, or hematemesis; No diarrhea or constipation. No melena or hematochezia.      MEDICATIONS  (STANDING):  albuterol/ipratropium for Nebulization 3 milliLiter(s) Nebulizer every 6 hours  enoxaparin Injectable 40 milliGRAM(s) SubCutaneous every 12 hours  fluticasone propionate/ salmeterol 250-50 MICROgram(s) Diskus 1 Dose(s) Inhalation two times a day  gabapentin 100 milliGRAM(s) Oral two times a day  lidocaine   4% Patch 1 Patch Transdermal daily  losartan 50 milliGRAM(s) Oral daily  pantoprazole    Tablet 40 milliGRAM(s) Oral before breakfast  piperacillin/tazobactam IVPB.. 3.375 Gram(s) IV Intermittent every 8 hours  polyethylene glycol 3350 17 Gram(s) Oral daily  potassium chloride   Powder 40 milliEquivalent(s) Oral every 8 hours  rosuvastatin 40 milliGRAM(s) Oral at bedtime  senna 2 Tablet(s) Oral at bedtime    MEDICATIONS  (PRN):  acetaminophen     Tablet .. 650 milliGRAM(s) Oral every 6 hours PRN Temp greater or equal to 38C (100.4F), Mild Pain (1 - 3), Moderate Pain (4 - 6)  guaiFENesin Oral Liquid (Sugar-Free) 100 milliGRAM(s) Oral every 6 hours PRN Cough  melatonin 3 milliGRAM(s) Oral at bedtime PRN Insomnia  promethazine 25 milliGRAM(s) Oral every 6 hours PRN allergies  traMADol 50 milliGRAM(s) Oral every 8 hours PRN Severe Pain (7 - 10)      CAPILLARY BLOOD GLUCOSE        I&O's Summary    04 Apr 2025 07:01  -  05 Apr 2025 07:00  --------------------------------------------------------  IN: 680 mL / OUT: 900 mL / NET: -220 mL        PHYSICAL EXAM:  Vital Signs Last 24 Hrs  T(C): 36.9 (05 Apr 2025 09:34), Max: 37.1 (04 Apr 2025 14:35)  T(F): 98.4 (05 Apr 2025 09:34), Max: 98.7 (04 Apr 2025 14:35)  HR: 101 (05 Apr 2025 09:34) (97 - 987)  BP: 151/59 (05 Apr 2025 09:34) (138/53 - 160/65)  BP(mean): --  RR: 18 (05 Apr 2025 09:34) (18 - 20)  SpO2: 96% (05 Apr 2025 09:34) (94% - 98%)    Parameters below as of 05 Apr 2025 09:34  Patient On (Oxygen Delivery Method): nasal cannula        CONSTITUTIONAL: NAD,  EYES: PERRLA; conjunctiva and sclera clear  ENMT: Moist oral mucosa, no pharyngeal injection or exudates;   NECK: Supple, no palpable masses;  RESPIRATORY: Normal respiratory effort; basilar crackles bilaterally  CARDIOVASCULAR: Regular rate and rhythm, normal S1 and S2, no murmur/rub/gallop; No lower extremity edema; Peripheral pulses are 2+ bilaterally  ABDOMEN: Nontender to palpation, normoactive bowel sounds, no rebound/guarding;   MUSCLOSKELETAL:   no clubbing or cyanosis of digits; no joint swelling or tenderness to palpation  PSYCH: A+O to person, place, and time; affect appropriate  NEUROLOGY: CN 2-12 are intact and symmetric; no gross sensory deficits;   SKIN: No rashes;     LABS:                        8.7    16.67 )-----------( 394      ( 05 Apr 2025 06:00 )             28.7     04-05    138  |  100  |  8   ----------------------------<  111[H]  3.4[L]   |  24  |  0.72    Ca    8.5      05 Apr 2025 06:00  Phos  3.1     04-05  Mg     2.10     04-05            Urinalysis Basic - ( 05 Apr 2025 06:00 )    Color: x / Appearance: x / SG: x / pH: x  Gluc: 111 mg/dL / Ketone: x  / Bili: x / Urobili: x   Blood: x / Protein: x / Nitrite: x   Leuk Esterase: x / RBC: x / WBC x   Sq Epi: x / Non Sq Epi: x / Bacteria: x          RADIOLOGY & ADDITIONAL TESTS:  Results Reviewed:   Imaging Personally Reviewed:  Electrocardiogram Personally Reviewed:    COORDINATION OF CARE:  Care Discussed with Consultants/Other Providers [Y/N]:  Prior or Outpatient Records Reviewed [Y/N]:

## 2025-04-05 NOTE — PROGRESS NOTE ADULT - PROBLEM SELECTOR PLAN 2
febrile on admission to [facility]  - infectious workup unrevealing - BCx/UA/CXR neg, QuantiFERON-TB Gold, fungal and viral serology pending  - CT chest with LLL lung nodule - management as below, CT CAP without infectious etiology   - ID (Dr. Bui) evaluated at [facility] - rec flow cytometry, AFB, bacterial and fungal cx to be sent with biopsy, , if any of these results come back positive will consult ID  - continue Zosyn (planned for empiric 7 day course per ID at [facility]) - through 4/7

## 2025-04-05 NOTE — PHYSICAL THERAPY INITIAL EVALUATION ADULT - PERTINENT HX OF CURRENT PROBLEM, REHAB EVAL
Pt is a 84 year old female with a past medical history of TAVR (2019), COPD (on PRN O2), HLD, S1 Fracture (02/2025 no surgical intervention) initially admitted to [facility] 3/31-4/4 for fever, found to have new LLL mass like consolidation, c/f malignancy, infectious workup negative, transferred to [facility] for bronchoscopy with interventional pulmonology.

## 2025-04-05 NOTE — DIETITIAN INITIAL EVALUATION ADULT - OTHER INFO
Pt seen at bedside. Observed breakfast tray with ~75% PO intake. No food preferences verbalized at this time. Pt declined oral nutritional supplement at this time. Patient denies difficulty chewing or swallowing at present. No GI distress noted at present; fecal incontinence with last BM 4/4 per RN flowsheet. Generalized edema 2+ noted, no PI/DTI noted, per RN flowsheet. Labs noted for hypokalemia. Potassium chloride per medication list for micronutrient support. Admission wt pending. Recommend to obtain admission wt to monitor wt trend. RD remains available upon request and will follow up per protocol.

## 2025-04-05 NOTE — PROGRESS NOTE ADULT - PROBLEM SELECTOR PLAN 6
s/p recent fall  - CT spine with s1 age indeterminant fx   - Tylenol/Tramadol PRN, lidocaine patch  - PT eval- NIKHIL

## 2025-04-05 NOTE — PHYSICAL THERAPY INITIAL EVALUATION ADULT - ASR WT BEARING STATUS EVAL
Changes In Treatment Protocol: Holding at 875mj per Aleshia DHILLON; 3 x weekly Protocol For Uva1: The patient received UVA1. Protocol For Photochemotherapy: Mineral Oil And Broad Band Uvb: The patient received Photochemotherapy: Mineral Oil and Broad Band UVB. Protocol For Photochemotherapy: Tar And Nbuvb (Goeckerman Treatment): The patient received Photochemotherapy: Tar and NBUVB (Goeckerman treatment). Protocol For Photochemotherapy For Severe Photoresponsive Dermatoses: Puva: The patient received Photochemotherapy for severe photoresponsive dermatoses: PUVA requiring at least 4 to 8 hours of care under direct physician supervision. Protocol For Photochemotherapy: Triamcinolone Ointment And Nbuvb: The patient received Photochemotherapy: Triamcinolone and NBUVB (triamcinolone ointment applied to all lesions prior to phototherapy). Post-Care Instructions: I reviewed with the patient in detail post-care instructions. Patient is to wear sun protection. Patients may expect sunburn like redness, discomfort and scabbing. Protocol For Broad Band Uvb: The patient received Broad Band UVB. Name Of Supervising Technician: MA Protocol For Photochemotherapy For Severe Photoresponsive Dermatoses: Tar And Broad Band Uvb (Goeckerman Treatment): The patient received Photochemotherapy for severe photoresponsive dermatoses: Tar and Broad Band UVB (Goeckerman treatment) requiring at least 4 to 8 hours of care under direct physician supervision. Protocol For Bath Puva: The patient received Bath PUVA. Protocol For Photochemotherapy: Petrolatum And Nbuvb: The patient received Photochemotherapy: Petrolatum and NBUVB (petrolatum applied to all lesions prior to phototherapy). Protocol For Photochemotherapy For Severe Photoresponsive Dermatoses: Tar And Nbuvb (Goeckerman Treatment): The patient received Photochemotherapy for severe photoresponsive dermatoses: Tar and NBUVB (Goeckerman treatment) requiring at least 4 to 8 hours of care under direct physician supervision. Protocol For Photochemotherapy: Tar And Broad Band Uvb (Goeckerman Treatment): The patient received Photochemotherapy: Tar and Broad Band UVB (Goeckerman treatment). Total Body Energy: 875 mj Protocol For Photochemotherapy For Severe Photoresponsive Dermatoses: Petrolatum And Broad Band Uvb: The patient received Photochemotherapyfor severe photoresponsive dermatoses: Petrolatum and Broad Band UVB requiring at least 4 to 8 hours of care under direct physician supervision. Protocol For Photochemotherapy: Baby Oil And Nbuvb: The patient received Photochemotherapy: Baby Oil and NBUVB (baby oil applied to all lesions prior to phototherapy). Protocol For Protocol For Photochemotherapy For Severe Photoresponsive Dermatoses: Bath Puva: The patient received Photochemotherapy for severe photoresponsive dermatoses: Bath PUVA requiring at least 4 to 8 hours of care under direct physician supervision. Protocol For Photochemotherapy For Severe Photoresponsive Dermatoses: Petrolatum And Nbuvb: The patient received Photochemotherapy for severe photoresponsive dermatoses: Petrolatum and NBUVB requiring at least 4 to 8 hours of care under direct physician supervision. Protocol: NBUVB Detail Level: Zone Render Post-Care In The Note: no Protocol For Uva: The patient received UVA. Protocol For Photochemotherapy: Petrolatum And Broad Band Uvb: The patient received Photochemotherapy: Petrolatum and Broad Band UVB. Protocol For Nbuvb: The patient received NBUVB. Protocol For Puva: The patient received PUVA. Comments On Previous Treatment: No burning, no change in meds, wears goggles Protocol For Photochemotherapy: Mineral Oil And Nbuvb: The patient received Photochemotherapy: Mineral Oil and NBUVB (mineral oil applied to all lesions prior to phototherapy). Consent: Written consent obtained.  The risks were reviewed with the patient including but not limited to: burn, pigmentary changes, pain, blistering, scabbing, redness, increased risk of skin cancers, and the remote possibility of scarring. Protocol For Nb Uva: The patient received NB UVA. no weight-bearing restrictions

## 2025-04-05 NOTE — PHYSICAL THERAPY INITIAL EVALUATION ADULT - LEVEL OF INDEPENDENCE: SIT/STAND, REHAB EVAL
due to dizziness while sitting at edge of the bed, symptoms subsided with return to supine/unable to perform

## 2025-04-05 NOTE — PHYSICAL THERAPY INITIAL EVALUATION ADULT - GENERAL OBSERVATIONS, REHAB EVAL
Chart reviewed and cleared for PT by MAGNOLIA Anyaa. Pt received semi supine in bed in NAD, all lines intact + IV, pulse ox, nasal canula, primafit, SpO2 97%, pts daughter at bedside.

## 2025-04-05 NOTE — DIETITIAN INITIAL EVALUATION ADULT - OTHER CALCULATIONS
Defer fluid needs to MD/Team.   Ideal Body Weight: 125lbs / 56.6kg +/-10%. IBW used to calculate nutritional estimated needs.   Wt hx as per [facility] HIE: Admission wt pending (4/4/25). 136.1kg (3/31), 95.7kg (3/10). Questionable wt hx. Recommend to obtain admission and daily weights to monitor wt trend.

## 2025-04-05 NOTE — DIETITIAN INITIAL EVALUATION ADULT - PROBLEM SELECTOR PLAN 2
febrile on admission to [facility]  - infectious workup unrevealing - BCx/UA/CXR neg, QuantiFERON-TB Gold, fungal and viral serology pending  - CT chest with LLL lung nodule - management as below, CT CAP without infectious etiology   - ID (Dr. Bui) evaluated at [facility] - rec flow cytometry, AFB, bacterial and fungal cx to be sent with biopsy, discussed with Dr. Ramos, if any of these results come back positive can consult ID at [facility]  - continue Zosyn (planned for empiric 7 day course per ID at [facility]) - today is day 4/7

## 2025-04-05 NOTE — PROGRESS NOTE ADULT - ASSESSMENT
84F with hx of TAVR (2019), COPD (on PRN O2), HLD, S1 Fracture (02/2025 no surgical intervention) initially admitted to [facility] 3/31-4/4 for fever, found to have new LLL mass like consolidation, c/f malignancy, infectious workup negative, transferred to [facility] for bronchoscopy with interventional pulmonology.

## 2025-04-05 NOTE — DIETITIAN INITIAL EVALUATION ADULT - REASON FOR ADMISSION
Per chart, Pt is 84F with hx of TAVR (2019), COPD (on PRN O2), HLD, S1 Fracture (02/2025 no surgical intervention) initially admitted to [facility] 3/31-4/4 for fever, found to have new LLL mass like consolidation, c/f malignancy, infectious workup negative, transferred to [facility] for bronchoscopy with interventional pulmonology.

## 2025-04-05 NOTE — DIETITIAN INITIAL EVALUATION ADULT - ORAL INTAKE PTA/DIET HISTORY
Patient reports generally good appetite/PO intake PTA, consumes a regular diet at baseline. Pt confirms NKFA, food intolerance. Pt reported UBW 200lbs. Pt denies any recent weight changes. Stated height 65 inches. Reported height used to monitor IBW.

## 2025-04-05 NOTE — DIETITIAN INITIAL EVALUATION ADULT - NS FNS DIET ORDER
Diet, NPO:   NPO for Procedure/Test     NPO Start Date: 06-Apr-2025,   NPO Start Time: 23:59 (04-05-25 @ 07:39) [Ordered]  Diet, DASH/TLC:   Sodium & Cholesterol Restricted (04-04-25 @ 15:30) [Active]

## 2025-04-06 LAB
ANION GAP SERPL CALC-SCNC: 15 MMOL/L — HIGH (ref 7–14)
BUN SERPL-MCNC: 10 MG/DL — SIGNIFICANT CHANGE UP (ref 7–23)
CALCIUM SERPL-MCNC: 8.4 MG/DL — SIGNIFICANT CHANGE UP (ref 8.4–10.5)
CHLORIDE SERPL-SCNC: 101 MMOL/L — SIGNIFICANT CHANGE UP (ref 98–107)
CO2 SERPL-SCNC: 23 MMOL/L — SIGNIFICANT CHANGE UP (ref 22–31)
CREAT SERPL-MCNC: 0.78 MG/DL — SIGNIFICANT CHANGE UP (ref 0.5–1.3)
EGFR: 75 ML/MIN/1.73M2 — SIGNIFICANT CHANGE UP
EGFR: 75 ML/MIN/1.73M2 — SIGNIFICANT CHANGE UP
GLUCOSE SERPL-MCNC: 103 MG/DL — HIGH (ref 70–99)
HCT VFR BLD CALC: 29.6 % — LOW (ref 34.5–45)
HGB BLD-MCNC: 9 G/DL — LOW (ref 11.5–15.5)
MAGNESIUM SERPL-MCNC: 2.2 MG/DL — SIGNIFICANT CHANGE UP (ref 1.6–2.6)
MCHC RBC-ENTMCNC: 16.9 PG — LOW (ref 27–34)
MCHC RBC-ENTMCNC: 30.4 G/DL — LOW (ref 32–36)
MCV RBC AUTO: 55.6 FL — LOW (ref 80–100)
NRBC # BLD AUTO: 0 K/UL — SIGNIFICANT CHANGE UP (ref 0–0)
NRBC # FLD: 0 K/UL — SIGNIFICANT CHANGE UP (ref 0–0)
NRBC BLD AUTO-RTO: 0 /100 WBCS — SIGNIFICANT CHANGE UP (ref 0–0)
PHOSPHATE SERPL-MCNC: 4.1 MG/DL — SIGNIFICANT CHANGE UP (ref 2.5–4.5)
PLATELET # BLD AUTO: 394 K/UL — SIGNIFICANT CHANGE UP (ref 150–400)
POTASSIUM SERPL-MCNC: 4 MMOL/L — SIGNIFICANT CHANGE UP (ref 3.5–5.3)
POTASSIUM SERPL-SCNC: 4 MMOL/L — SIGNIFICANT CHANGE UP (ref 3.5–5.3)
RBC # BLD: 5.32 M/UL — HIGH (ref 3.8–5.2)
RBC # FLD: 20.4 % — HIGH (ref 10.3–14.5)
SODIUM SERPL-SCNC: 139 MMOL/L — SIGNIFICANT CHANGE UP (ref 135–145)
WBC # BLD: 15.93 K/UL — HIGH (ref 3.8–10.5)
WBC # FLD AUTO: 15.93 K/UL — HIGH (ref 3.8–10.5)

## 2025-04-06 PROCEDURE — 99233 SBSQ HOSP IP/OBS HIGH 50: CPT

## 2025-04-06 RX ADMIN — Medication 25 GRAM(S): at 14:33

## 2025-04-06 RX ADMIN — Medication 40 MILLIGRAM(S): at 05:14

## 2025-04-06 RX ADMIN — GABAPENTIN 100 MILLIGRAM(S): 400 CAPSULE ORAL at 05:14

## 2025-04-06 RX ADMIN — GABAPENTIN 100 MILLIGRAM(S): 400 CAPSULE ORAL at 17:22

## 2025-04-06 RX ADMIN — DEXTROMETHORPHAN HBR, GUAIFENESIN 100 MILLIGRAM(S): 200 LIQUID ORAL at 17:48

## 2025-04-06 RX ADMIN — IPRATROPIUM BROMIDE AND ALBUTEROL SULFATE 3 MILLILITER(S): .5; 2.5 SOLUTION RESPIRATORY (INHALATION) at 16:17

## 2025-04-06 RX ADMIN — Medication 1 DOSE(S): at 21:37

## 2025-04-06 RX ADMIN — Medication 25 GRAM(S): at 21:37

## 2025-04-06 RX ADMIN — ENOXAPARIN SODIUM 40 MILLIGRAM(S): 100 INJECTION SUBCUTANEOUS at 05:14

## 2025-04-06 RX ADMIN — LOSARTAN POTASSIUM 50 MILLIGRAM(S): 100 TABLET, FILM COATED ORAL at 05:14

## 2025-04-06 RX ADMIN — ROSUVASTATIN CALCIUM 40 MILLIGRAM(S): 20 TABLET, FILM COATED ORAL at 21:37

## 2025-04-06 RX ADMIN — ENOXAPARIN SODIUM 40 MILLIGRAM(S): 100 INJECTION SUBCUTANEOUS at 17:22

## 2025-04-06 RX ADMIN — Medication 1 DOSE(S): at 14:36

## 2025-04-06 RX ADMIN — Medication 25 GRAM(S): at 05:05

## 2025-04-06 RX ADMIN — IPRATROPIUM BROMIDE AND ALBUTEROL SULFATE 3 MILLILITER(S): .5; 2.5 SOLUTION RESPIRATORY (INHALATION) at 22:27

## 2025-04-06 NOTE — PROGRESS NOTE ADULT - PROBLEM SELECTOR PLAN 3
WBC 18 (was 65 on admission)  - infectious workup unrevealing - BCx/UA/CXR neg, QuantiFERON-TB Gold, fungal and viral serology pending  - ID evaluated at [facility] zosyn x 7 days, rec flow cytometry, AFB, bacterial and fungal cx to be sent with biopsy  - trend WBC

## 2025-04-06 NOTE — PROGRESS NOTE ADULT - PROBLEM SELECTOR PLAN 2
febrile on admission to [facility]  - infectious workup unrevealing - BCx/UA/CXR neg, QuantiFERON-TB Gold, fungal and viral serology pending  - CT chest with LLL lung nodule - management as below, CT CAP without infectious etiology   - ID (Dr. Bui) evaluated at [facility] - rec flow cytometry, AFB, bacterial and fungal cx to be sent with biopsy, , if any of these results come back positive will consult ID  - continue Zosyn (planned for empiric 7 day course per ID at [facility]) - through 4/7

## 2025-04-06 NOTE — PROGRESS NOTE ADULT - SUBJECTIVE AND OBJECTIVE BOX
[facility] Division of Hospital Medicine  Quincy Smith MD  Pager [phone]      Patient is a 84y old  Female who presents with a chief complaint of Per chart, Pt is 84F with hx of TAVR (2019), COPD (on PRN O2), HLD, S1 Fracture (02/2025 no surgical intervention) initially admitted to [facility] 3/31-4/4 for fever, found to have new LLL mass like consolidation, c/f malignancy, infectious workup negative, transferred to [facility] for bronchoscopy with interventional pulmonology.   (05 Apr 2025 12:19)      SUBJECTIVE / OVERNIGHT EVENTS:    no acute event o/n. no new complaints     ADDITIONAL REVIEW OF SYSTEMS:    RESPIRATORY: No cough, wheezing, chills or hemoptysis; No shortness of breath  CARDIOVASCULAR: No chest pain, palpitations, dizziness, or leg swelling  GASTROINTESTINAL: No abdominal or epigastric pain. No nausea, vomiting, or hematemesis; No diarrhea or constipation. No melena or hematochezia.      MEDICATIONS  (STANDING):  albuterol/ipratropium for Nebulization 3 milliLiter(s) Nebulizer every 6 hours  enoxaparin Injectable 40 milliGRAM(s) SubCutaneous every 12 hours  fluticasone propionate/ salmeterol 250-50 MICROgram(s) Diskus 1 Dose(s) Inhalation two times a day  gabapentin 100 milliGRAM(s) Oral two times a day  lidocaine   4% Patch 1 Patch Transdermal daily  losartan 50 milliGRAM(s) Oral daily  pantoprazole    Tablet 40 milliGRAM(s) Oral before breakfast  piperacillin/tazobactam IVPB.. 3.375 Gram(s) IV Intermittent every 8 hours  polyethylene glycol 3350 17 Gram(s) Oral daily  rosuvastatin 40 milliGRAM(s) Oral at bedtime  senna 2 Tablet(s) Oral at bedtime    MEDICATIONS  (PRN):  acetaminophen     Tablet .. 650 milliGRAM(s) Oral every 6 hours PRN Temp greater or equal to 38C (100.4F), Mild Pain (1 - 3), Moderate Pain (4 - 6)  guaiFENesin Oral Liquid (Sugar-Free) 100 milliGRAM(s) Oral every 6 hours PRN Cough  melatonin 3 milliGRAM(s) Oral at bedtime PRN Insomnia  promethazine 25 milliGRAM(s) Oral every 6 hours PRN allergies  traMADol 50 milliGRAM(s) Oral every 8 hours PRN Severe Pain (7 - 10)      CAPILLARY BLOOD GLUCOSE        I&O's Summary    05 Apr 2025 07:01  -  06 Apr 2025 07:00  --------------------------------------------------------  IN: 1270 mL / OUT: 1200 mL / NET: 70 mL        PHYSICAL EXAM:  Vital Signs Last 24 Hrs  T(C): 36.8 (06 Apr 2025 09:49), Max: 37 (06 Apr 2025 05:55)  T(F): 98.2 (06 Apr 2025 09:49), Max: 98.6 (06 Apr 2025 05:55)  HR: 95 (06 Apr 2025 09:49) (89 - 96)  BP: 122/56 (06 Apr 2025 09:49) (122/56 - 140/56)  BP(mean): --  RR: 19 (06 Apr 2025 09:49) (18 - 24)  SpO2: 98% (06 Apr 2025 09:49) (95% - 98%)    Parameters below as of 06 Apr 2025 09:49  Patient On (Oxygen Delivery Method): nasal cannula        CONSTITUTIONAL: NAD,  EYES: PERRLA; conjunctiva and sclera clear  ENMT: Moist oral mucosa, no pharyngeal injection or exudates;   NECK: Supple, no palpable masses;  RESPIRATORY: Normal respiratory effort; basilar crackles  bilaterally  CARDIOVASCULAR: Regular rate and rhythm, normal S1 and S2, no murmur/rub/gallop; No lower extremity edema; Peripheral pulses are 2+ bilaterally  ABDOMEN: Nontender to palpation, normoactive bowel sounds, no rebound/guarding;   MUSCLOSKELETAL:   no clubbing or cyanosis of digits; no joint swelling or tenderness to palpation  PSYCH: A+O to person, place, and time; affect appropriate  NEUROLOGY: CN 2-12 are intact and symmetric; no gross sensory deficits;   SKIN: No rashes;     LABS:                        9.0    15.93 )-----------( 394      ( 06 Apr 2025 07:31 )             29.6     04-06    139  |  101  |  10  ----------------------------<  103[H]  4.0   |  23  |  0.78    Ca    8.4      06 Apr 2025 07:31  Phos  4.1     04-06  Mg     2.20     04-06            Urinalysis Basic - ( 06 Apr 2025 07:31 )    Color: x / Appearance: x / SG: x / pH: x  Gluc: 103 mg/dL / Ketone: x  / Bili: x / Urobili: x   Blood: x / Protein: x / Nitrite: x   Leuk Esterase: x / RBC: x / WBC x   Sq Epi: x / Non Sq Epi: x / Bacteria: x          RADIOLOGY & ADDITIONAL TESTS:  Results Reviewed:   Imaging Personally Reviewed:  Electrocardiogram Personally Reviewed:    COORDINATION OF CARE:  Care Discussed with Consultants/Other Providers [Y/N]:  Prior or Outpatient Records Reviewed [Y/N]:

## 2025-04-06 NOTE — PROGRESS NOTE ADULT - PROBLEM SELECTOR PLAN 1
noted on imaging  - CT with 2.4cm lung nodule  - IR unable to obtain percutaneous biopsy  - appreciate IP - planned for bronchoscopy 4/7- NPO after MN. hold am Lovenox dose tomorrow.

## 2025-04-06 NOTE — PROGRESS NOTE ADULT - ASSESSMENT
84F with hx of TAVR (2019), COPD (on PRN O2), HLD, S1 Fracture (02/2025 no surgical intervention) initially admitted to [facility] 3/31-4/4 for fever, found to have new LLL mass like consolidation, c/f malignancy, infectious workup negative, transferred to [facility] for bronchoscopy with interventional pulmonology.

## 2025-04-07 ENCOUNTER — RESULT REVIEW (OUTPATIENT)
Age: 85
End: 2025-04-07

## 2025-04-07 LAB
ALBUMIN SERPL ELPH-MCNC: 2.8 G/DL — LOW (ref 3.3–5)
ALP SERPL-CCNC: 70 U/L — SIGNIFICANT CHANGE UP (ref 40–120)
ALT FLD-CCNC: 13 U/L — SIGNIFICANT CHANGE UP (ref 4–33)
ANION GAP SERPL CALC-SCNC: 13 MMOL/L — SIGNIFICANT CHANGE UP (ref 7–14)
ANION GAP SERPL CALC-SCNC: 17 MMOL/L — HIGH (ref 7–14)
APPEARANCE UR: ABNORMAL
APTT BLD: 39.7 SEC — HIGH (ref 24.5–35.6)
AST SERPL-CCNC: 12 U/L — SIGNIFICANT CHANGE UP (ref 4–32)
B PERT IGG+IGM PNL SER: ABNORMAL
BACTERIA # UR AUTO: ABNORMAL /HPF
BILIRUB SERPL-MCNC: 0.4 MG/DL — SIGNIFICANT CHANGE UP (ref 0.2–1.2)
BILIRUB UR-MCNC: ABNORMAL
BLOOD GAS VENOUS COMPREHENSIVE RESULT: SIGNIFICANT CHANGE UP
BUN SERPL-MCNC: 12 MG/DL — SIGNIFICANT CHANGE UP (ref 7–23)
BUN SERPL-MCNC: 18 MG/DL — SIGNIFICANT CHANGE UP (ref 7–23)
CALCIUM SERPL-MCNC: 8.2 MG/DL — LOW (ref 8.4–10.5)
CALCIUM SERPL-MCNC: 8.3 MG/DL — LOW (ref 8.4–10.5)
CAST: 1 /LPF — SIGNIFICANT CHANGE UP (ref 0–4)
CHLORIDE SERPL-SCNC: 101 MMOL/L — SIGNIFICANT CHANGE UP (ref 98–107)
CHLORIDE SERPL-SCNC: 99 MMOL/L — SIGNIFICANT CHANGE UP (ref 98–107)
CO2 SERPL-SCNC: 21 MMOL/L — LOW (ref 22–31)
CO2 SERPL-SCNC: 24 MMOL/L — SIGNIFICANT CHANGE UP (ref 22–31)
COLOR FLD: ABNORMAL
COLOR SPEC: SIGNIFICANT CHANGE UP
CREAT SERPL-MCNC: 0.89 MG/DL — SIGNIFICANT CHANGE UP (ref 0.5–1.3)
CREAT SERPL-MCNC: 1.31 MG/DL — HIGH (ref 0.5–1.3)
DIFF PNL FLD: ABNORMAL
EGFR: 40 ML/MIN/1.73M2 — LOW
EGFR: 40 ML/MIN/1.73M2 — LOW
EGFR: 64 ML/MIN/1.73M2 — SIGNIFICANT CHANGE UP
EGFR: 64 ML/MIN/1.73M2 — SIGNIFICANT CHANGE UP
EOSINOPHIL # FLD: 0 % — SIGNIFICANT CHANGE UP
FLUID INTAKE SUBSTANCE CLASS: SIGNIFICANT CHANGE UP
FOLATE+VIT B12 SERBLD-IMP: 0 % — SIGNIFICANT CHANGE UP
GAMMA INTERFERON BACKGROUND BLD IA-ACNC: 0.02 IU/ML — SIGNIFICANT CHANGE UP
GLUCOSE SERPL-MCNC: 125 MG/DL — HIGH (ref 70–99)
GLUCOSE SERPL-MCNC: 164 MG/DL — HIGH (ref 70–99)
GLUCOSE UR QL: NEGATIVE MG/DL — SIGNIFICANT CHANGE UP
GRAM STN FLD: SIGNIFICANT CHANGE UP
HCT VFR BLD CALC: 29.1 % — LOW (ref 34.5–45)
HGB BLD-MCNC: 8.9 G/DL — LOW (ref 11.5–15.5)
INR BLD: 1.37 RATIO — HIGH (ref 0.85–1.16)
KETONES UR-MCNC: NEGATIVE MG/DL — SIGNIFICANT CHANGE UP
LEUKOCYTE ESTERASE UR-ACNC: ABNORMAL
LYMPHOCYTES # FLD: 3 % — SIGNIFICANT CHANGE UP
M TB IFN-G BLD-IMP: ABNORMAL
M TB IFN-G CD4+ BCKGRND COR BLD-ACNC: 0 IU/ML — SIGNIFICANT CHANGE UP
M TB IFN-G CD4+CD8+ BCKGRND COR BLD-ACNC: 0.01 IU/ML — SIGNIFICANT CHANGE UP
MAGNESIUM SERPL-MCNC: 2 MG/DL — SIGNIFICANT CHANGE UP (ref 1.6–2.6)
MAGNESIUM SERPL-MCNC: 2 MG/DL — SIGNIFICANT CHANGE UP (ref 1.6–2.6)
MCHC RBC-ENTMCNC: 16.9 PG — LOW (ref 27–34)
MCHC RBC-ENTMCNC: 30.6 G/DL — LOW (ref 32–36)
MCV RBC AUTO: 55.3 FL — LOW (ref 80–100)
MESOTHL CELL # FLD: 0 % — SIGNIFICANT CHANGE UP
MONOS+MACROS # FLD: 23 % — SIGNIFICANT CHANGE UP
NEUTROPHILS-BODY FLUID: 43 % — SIGNIFICANT CHANGE UP
NITRITE UR-MCNC: NEGATIVE — SIGNIFICANT CHANGE UP
NRBC # BLD AUTO: 0 K/UL — SIGNIFICANT CHANGE UP (ref 0–0)
NRBC # FLD: 0 K/UL — SIGNIFICANT CHANGE UP (ref 0–0)
NRBC BLD AUTO-RTO: 0 /100 WBCS — SIGNIFICANT CHANGE UP (ref 0–0)
OTHER CELLS FLD MANUAL: 31 % — SIGNIFICANT CHANGE UP
PH UR: 5.5 — SIGNIFICANT CHANGE UP (ref 5–8)
PHOSPHATE SERPL-MCNC: 3.7 MG/DL — SIGNIFICANT CHANGE UP (ref 2.5–4.5)
PHOSPHATE SERPL-MCNC: 5.5 MG/DL — HIGH (ref 2.5–4.5)
PLATELET # BLD AUTO: 431 K/UL — HIGH (ref 150–400)
POTASSIUM SERPL-MCNC: 3.8 MMOL/L — SIGNIFICANT CHANGE UP (ref 3.5–5.3)
POTASSIUM SERPL-MCNC: 4.6 MMOL/L — SIGNIFICANT CHANGE UP (ref 3.5–5.3)
POTASSIUM SERPL-SCNC: 3.8 MMOL/L — SIGNIFICANT CHANGE UP (ref 3.5–5.3)
POTASSIUM SERPL-SCNC: 4.6 MMOL/L — SIGNIFICANT CHANGE UP (ref 3.5–5.3)
PROT SERPL-MCNC: 6 G/DL — SIGNIFICANT CHANGE UP (ref 6–8.3)
PROT UR-MCNC: 30 MG/DL
PROTHROM AB SERPL-ACNC: 15.8 SEC — HIGH (ref 9.9–13.4)
QUANT TB PLUS MITOGEN MINUS NIL: 0.04 IU/ML — SIGNIFICANT CHANGE UP
RBC # BLD: 5.26 M/UL — HIGH (ref 3.8–5.2)
RBC # FLD: 20.4 % — HIGH (ref 10.3–14.5)
RBC CASTS # UR COMP ASSIST: 66 /HPF — HIGH (ref 0–4)
RCV VOL RI: SIGNIFICANT CHANGE UP CELLS/UL (ref 0–5)
REVIEW: SIGNIFICANT CHANGE UP
SODIUM SERPL-SCNC: 137 MMOL/L — SIGNIFICANT CHANGE UP (ref 135–145)
SODIUM SERPL-SCNC: 138 MMOL/L — SIGNIFICANT CHANGE UP (ref 135–145)
SP GR SPEC: 1.03 — SIGNIFICANT CHANGE UP (ref 1–1.03)
SPECIMEN SOURCE FLD: SIGNIFICANT CHANGE UP
SPECIMEN SOURCE: SIGNIFICANT CHANGE UP
SQUAMOUS # UR AUTO: 2 /HPF — SIGNIFICANT CHANGE UP (ref 0–5)
TOTAL CELLS COUNTED, BODY FLUID: 100 CELLS — SIGNIFICANT CHANGE UP
TOTAL NUCLEATED CELL COUNT, BODY FLUID: SIGNIFICANT CHANGE UP CELLS/UL (ref 0–5)
TUBE TYPE: SIGNIFICANT CHANGE UP
URATE CRY FLD QL MICRO: PRESENT
UROBILINOGEN FLD QL: 1 MG/DL — SIGNIFICANT CHANGE UP (ref 0.2–1)
WBC # BLD: 26.13 K/UL — HIGH (ref 3.8–10.5)
WBC # FLD AUTO: 26.13 K/UL — HIGH (ref 3.8–10.5)
WBC UR QL: 4 /HPF — SIGNIFICANT CHANGE UP (ref 0–5)

## 2025-04-07 PROCEDURE — 88333 PATH CONSLTJ SURG CYTO XM 1: CPT | Mod: 26,59

## 2025-04-07 PROCEDURE — 31629 BRONCHOSCOPY/NEEDLE BX EACH: CPT | Mod: GC

## 2025-04-07 PROCEDURE — 31645 BRNCHSC W/THER ASPIR 1ST: CPT | Mod: GC

## 2025-04-07 PROCEDURE — 88341 IMHCHEM/IMCYTCHM EA ADD ANTB: CPT | Mod: 26

## 2025-04-07 PROCEDURE — 71045 X-RAY EXAM CHEST 1 VIEW: CPT | Mod: 26

## 2025-04-07 PROCEDURE — 88342 IMHCHEM/IMCYTCHM 1ST ANTB: CPT | Mod: 26

## 2025-04-07 PROCEDURE — 88305 TISSUE EXAM BY PATHOLOGIST: CPT | Mod: 26

## 2025-04-07 PROCEDURE — 88112 CYTOPATH CELL ENHANCE TECH: CPT | Mod: 26,59

## 2025-04-07 PROCEDURE — 31654 BRONCH EBUS IVNTJ PERPH LES: CPT | Mod: GC

## 2025-04-07 PROCEDURE — 31627 NAVIGATIONAL BRONCHOSCOPY: CPT | Mod: GC

## 2025-04-07 PROCEDURE — 31628 BRONCHOSCOPY/LUNG BX EACH: CPT | Mod: GC

## 2025-04-07 PROCEDURE — 31624 DX BRONCHOSCOPE/LAVAGE: CPT | Mod: GC

## 2025-04-07 PROCEDURE — 99233 SBSQ HOSP IP/OBS HIGH 50: CPT

## 2025-04-07 PROCEDURE — 99233 SBSQ HOSP IP/OBS HIGH 50: CPT | Mod: GC,25

## 2025-04-07 PROCEDURE — 88173 CYTOPATH EVAL FNA REPORT: CPT | Mod: 26,59

## 2025-04-07 PROCEDURE — 88172 CYTP DX EVAL FNA 1ST EA SITE: CPT | Mod: 26,59

## 2025-04-07 PROCEDURE — 93010 ELECTROCARDIOGRAM REPORT: CPT

## 2025-04-07 PROCEDURE — 31653 BRONCH EBUS SAMPLNG 3/> NODE: CPT | Mod: GC

## 2025-04-07 DEVICE — SYS GALAXY BRONCHOSCOPE SINGLE USE: Type: IMPLANTABLE DEVICE | Status: FUNCTIONAL

## 2025-04-07 RX ORDER — SODIUM CHLORIDE 9 G/1000ML
500 INJECTION, SOLUTION INTRAVENOUS ONCE
Refills: 0 | Status: COMPLETED | OUTPATIENT
Start: 2025-04-07 | End: 2025-04-07

## 2025-04-07 RX ORDER — VANCOMYCIN HCL IN 5 % DEXTROSE 1.5G/250ML
1000 PLASTIC BAG, INJECTION (ML) INTRAVENOUS ONCE
Refills: 0 | Status: COMPLETED | OUTPATIENT
Start: 2025-04-07 | End: 2025-04-07

## 2025-04-07 RX ORDER — ONDANSETRON HCL/PF 4 MG/2 ML
4 VIAL (ML) INJECTION ONCE
Refills: 0 | Status: DISCONTINUED | OUTPATIENT
Start: 2025-04-07 | End: 2025-04-07

## 2025-04-07 RX ORDER — OXYCODONE HYDROCHLORIDE 30 MG/1
5 TABLET ORAL ONCE
Refills: 0 | Status: DISCONTINUED | OUTPATIENT
Start: 2025-04-07 | End: 2025-04-07

## 2025-04-07 RX ORDER — FENTANYL CITRATE-0.9 % NACL/PF 100MCG/2ML
25 SYRINGE (ML) INTRAVENOUS
Refills: 0 | Status: DISCONTINUED | OUTPATIENT
Start: 2025-04-07 | End: 2025-04-07

## 2025-04-07 RX ORDER — SODIUM CHLORIDE 9 G/1000ML
1000 INJECTION, SOLUTION INTRAVENOUS
Refills: 0 | Status: DISCONTINUED | OUTPATIENT
Start: 2025-04-07 | End: 2025-04-07

## 2025-04-07 RX ORDER — OXYCODONE HYDROCHLORIDE 30 MG/1
10 TABLET ORAL ONCE
Refills: 0 | Status: DISCONTINUED | OUTPATIENT
Start: 2025-04-07 | End: 2025-04-07

## 2025-04-07 RX ORDER — ALBUMIN (HUMAN) 12.5 G/50ML
250 INJECTION, SOLUTION INTRAVENOUS ONCE
Refills: 0 | Status: COMPLETED | OUTPATIENT
Start: 2025-04-07 | End: 2025-04-07

## 2025-04-07 RX ADMIN — LOSARTAN POTASSIUM 50 MILLIGRAM(S): 100 TABLET, FILM COATED ORAL at 05:39

## 2025-04-07 RX ADMIN — SODIUM CHLORIDE 500 MILLILITER(S): 9 INJECTION, SOLUTION INTRAVENOUS at 17:15

## 2025-04-07 RX ADMIN — IPRATROPIUM BROMIDE AND ALBUTEROL SULFATE 3 MILLILITER(S): .5; 2.5 SOLUTION RESPIRATORY (INHALATION) at 03:30

## 2025-04-07 RX ADMIN — SODIUM CHLORIDE 75 MILLILITER(S): 9 INJECTION, SOLUTION INTRAVENOUS at 17:00

## 2025-04-07 RX ADMIN — Medication 40 MILLIGRAM(S): at 05:39

## 2025-04-07 RX ADMIN — Medication 1000 MILLILITER(S): at 19:52

## 2025-04-07 RX ADMIN — SODIUM CHLORIDE 500 MILLILITER(S): 9 INJECTION, SOLUTION INTRAVENOUS at 19:52

## 2025-04-07 RX ADMIN — Medication 250 MILLIGRAM(S): at 20:46

## 2025-04-07 RX ADMIN — GABAPENTIN 100 MILLIGRAM(S): 400 CAPSULE ORAL at 22:48

## 2025-04-07 RX ADMIN — ROSUVASTATIN CALCIUM 40 MILLIGRAM(S): 20 TABLET, FILM COATED ORAL at 22:49

## 2025-04-07 RX ADMIN — Medication 1 DOSE(S): at 10:45

## 2025-04-07 RX ADMIN — Medication 25 GRAM(S): at 05:39

## 2025-04-07 RX ADMIN — Medication 2 TABLET(S): at 22:50

## 2025-04-07 RX ADMIN — ALBUMIN (HUMAN) 500 MILLILITER(S): 12.5 INJECTION, SOLUTION INTRAVENOUS at 20:10

## 2025-04-07 RX ADMIN — Medication 1 LOZENGE: at 22:49

## 2025-04-07 RX ADMIN — Medication 1 DOSE(S): at 22:52

## 2025-04-07 RX ADMIN — Medication 25 GRAM(S): at 22:50

## 2025-04-07 RX ADMIN — GABAPENTIN 100 MILLIGRAM(S): 400 CAPSULE ORAL at 05:39

## 2025-04-07 NOTE — CONSULT NOTE ADULT - ASSESSMENT
84F with hx of TAVR (2019), COPD (on PRN O2), HLD, S1 Fracture (02/2025 no surgical intervention) initially admitted to [facility] 3/31-4/4 for fever, found to have new LL mass like consolidation for which IP was consulted    #LLL nodule  s/p navigational bronchoscopy 4/7/25 with transbronchial biopsy of the LLL, EBUS with lymph node biopsies and BAL  f/u post procedure CXR  Unclear if artifact noted intraoperative telemetry with abnormal ST segments, post operative EKG was ordered-follow up and consider cardiology evaluation pending results  f/u BAL, LN Bx and TBBx results

## 2025-04-07 NOTE — PROGRESS NOTE ADULT - PROBLEM SELECTOR PLAN 1
noted on imaging  - CT with 2.4cm lung nodule  - IR unable to obtain percutaneous biopsy  - appreciate IP - planned for bronchoscopy 4/7- NPO after MN. hold am Lovenox dose tomorrow. noted on imaging  - CT with 2.4cm lung nodule  - IR unable to obtain percutaneous biopsy  - appreciate IP - planned for bronchoscopy 4/7- NPO for procedure . held am Lovenox dose today

## 2025-04-07 NOTE — CONSULT NOTE ADULT - ATTENDING COMMENTS
Patient seen and examined with team at bedside during rounds after lab data, medical records and radiology reports reviewed. I have read and agreeable in general with the documented note, assessment, and management plan which reflects my opinions from bedside rounds. Agree with note above and will highlight the followin-year-old female with history of TAVR in 2019 COPD recent S1 fracture being medically managed who presented with fever and [facility] found to have a left lower lobe masslike consolidation abutting descending aorta.  IP consulted for consideration of biopsy.  review of CT from 3/31/2025 demonstrating left lower lobe consolidation round in appearance.  This was not previously noted in 2019 on TAVR evaluation CT or follow-up CT in October.    however is possible for nodule to progress during this period of time.  Possibility of infection does exist as well.  White blood cell count remains elevated but is previously 65 now down to 26 although this is of unclear etiology was treated with empiric Zosyn.  No lymphadenopathy  noted but  and on lymph node focus  lymphoma could be possible as a .  I suspect this is likely malignancy given the rounded appearance.  Will evaluate lymph nodes as well during procedure. Risks and benefits discussed with patient and daughter today        I have personally provided 64 minutes of non-critical care time concurrently with the resident/fellow/NP/PA. This time excludes time spent on separate procedures and time spent teaching. I have reviewed the resident/fellow/NP/PA’s documentation and I agree with the assessment and plan of care. I have personally reviewed laboratory data, radiology results, discussion with primary team\patient, discussion with consulting services, and monitoring for potential decompensation. Interventional Pulmonology services provided to the patient were separate from general pulmonary service due to complexity of issues and interventions required.  Complex patient requiring advanced services.    Shaheed Contreras MD  Interventional Pulmonology & Critical Care Medicine

## 2025-04-07 NOTE — PROGRESS NOTE ADULT - ASSESSMENT
84F with hx of TAVR (2019), COPD (on PRN O2), HLD, S1 Fracture (02/2025 no surgical intervention) initially admitted to [facility] 3/31-4/4 for fever, found to have new LLL mass like consolidation, c/f malignancy, infectious workup negative, transferred to [facility] for bronchoscopy with interventional pulmonology.

## 2025-04-07 NOTE — CHART NOTE - NSCHARTNOTEFT_GEN_A_CORE
+ Called by interventional pulm fellow and told about Fever 102F after  bronchoscopy ( not documented in flowsheets yet)- Hypotension-92/60 given IVF bolus by PACU. Will give more IVF and repeat blood culture/urine culture/ lactate- STAT as per Dr Martinez. Continue agnes. CXR ordered as well case discussed with ID- add vancomycin x 1. MRSA PCR Will monitor patient closely.                                                           Medicine Subsequent Hospital Care Note- ACP  CC:  HPI/Subjective:  ROS:  Denies fever, chills, diaphoresis , malaise, night sweat, generalized weakness, SOB cough, sputum production, wheezing, hemoptysis, CP, CARTWRIGHT, orthopnea, PND, palpitations, diaphoresis, lightheadedness, dizziness, syncope, edema. nausea, vomiting, diarrhea, constipation, abdominal pain, melena, hematochezia, dysphagia, dysuria, frequency, urgency, hematuria, nocturia.    -------------------------------------------------------------------------------------------------------------------------------------------------  Vital Signs:  Vital Signs Last 24 Hrs  T(C): 36.5 (04-07-25 @ 18:00), Max: 39 (04-07-25 @ 16:05)  T(F): 97.7 (04-07-25 @ 18:00), Max: 102.2 (04-07-25 @ 16:05)  HR: 89 (04-07-25 @ 18:15) (76 - 111)  BP: 109/45 (04-07-25 @ 18:15) (76/50 - 137/60)  RR: 31 (04-07-25 @ 18:15) (16 - 43)  SpO2: 100% (04-07-25 @ 18:15) (93% - 100%) on (O2)    Telemetry/Alarms:  General: WN/WD NAD  Neurology: Awake, nonfocal, AGARWAL x 4  Eyes: Scleras clear, PERRLA/ EOMI, Gross vision intact  ENT:Gross hearing intact, grossly patent pharynx, no stridor  Neck: Neck supple, trachea midline, No JVD,   Respiratory: CTA B/L, No wheezing, rales, rhonchi  CV: RRR, S1S2, no murmurs, rubs or gallops  Abdominal: Soft, NT, ND +BS,   Extremities: No edema, + peripheral pulses  Skin: No Rashes, Hematoma, Ecchymosis  Lymphatic: No Neck, axilla, groin LAD  Psych: Oriented x 3, normal affect  Relevant labs, radiology and Medications reviewed                        8.9    26.13 )-----------( 431      ( 07 Apr 2025 06:05 )             29.1     04-07    137  |  99  |  12  ----------------------------<  125[H]  3.8   |  21[L]  |  0.89    Ca    8.3[L]      07 Apr 2025 06:05  Phos  3.7     04-07  Mg     2.00     04-07      PT/INR - ( 07 Apr 2025 06:05 )   PT: 15.8 sec;   INR: 1.37 ratio         PTT - ( 07 Apr 2025 06:05 )  PTT:39.7 sec  MEDICATIONS  (STANDING):  albuterol/ipratropium for Nebulization 3 milliLiter(s) Nebulizer every 6 hours  fluticasone propionate/ salmeterol 250-50 MICROgram(s) Diskus 1 Dose(s) Inhalation two times a day  gabapentin 100 milliGRAM(s) Oral two times a day  lactated ringers Bolus 500 milliLiter(s) IV Bolus once  lactated ringers. 1000 milliLiter(s) (75 mL/Hr) IV Continuous <Continuous>  lidocaine   4% Patch 1 Patch Transdermal daily  losartan 50 milliGRAM(s) Oral daily  pantoprazole    Tablet 40 milliGRAM(s) Oral before breakfast  piperacillin/tazobactam IVPB.. 3.375 Gram(s) IV Intermittent every 8 hours  polyethylene glycol 3350 17 Gram(s) Oral daily  rosuvastatin 40 milliGRAM(s) Oral at bedtime  senna 2 Tablet(s) Oral at bedtime  vancomycin  IVPB 1000 milliGRAM(s) IV Intermittent once    MEDICATIONS  (PRN):  acetaminophen     Tablet .. 650 milliGRAM(s) Oral every 6 hours PRN Temp greater or equal to 38C (100.4F), Mild Pain (1 - 3), Moderate Pain (4 - 6)  fentaNYL    Injectable 25 MICROGram(s) IV Push every 5 minutes PRN Severe Pain (7 - 10)  guaiFENesin Oral Liquid (Sugar-Free) 100 milliGRAM(s) Oral every 6 hours PRN Cough  melatonin 3 milliGRAM(s) Oral at bedtime PRN Insomnia  ondansetron Injectable 4 milliGRAM(s) IV Push once PRN Nausea and/or Vomiting  oxyCODONE    IR 5 milliGRAM(s) Oral once PRN Mild Pain (1 - 3)  oxyCODONE    IR 10 milliGRAM(s) Oral once PRN Moderate Pain (4 - 6)  promethazine 25 milliGRAM(s) Oral every 6 hours PRN allergies  traMADol 50 milliGRAM(s) Oral every 8 hours PRN Severe Pain (7 - 10)    I&O's Summary    06 Apr 2025 07:01  -  07 Apr 2025 07:00  --------------------------------------------------------  IN: 1150 mL / OUT: 1400 mL / NET: -250 mL    07 Apr 2025 07:01  -  07 Apr 2025 18:25  --------------------------------------------------------  IN: 1275 mL / OUT: 120 mL / NET: 1155 mL      I reviewed the above lab results, tests, telemetry, and  EKG interpretation. .  Assessment  84y Female  w/ PAST MEDICAL & SURGICAL HISTORY:  HTN (hypertension)  COPD (chronic obstructive pulmonary disease)  HLD (hyperlipidemia)  S/P TAVR (transcatheter aortic valve replacement)    admitted with complaints of Patient is a 84y old  Female who presents with a chief complaint of Per chart, Pt is 84F with hx of TAVR (2019), COPD (on PRN O2), HLD, S1 Fracture (02/2025 no surgical intervention) initially admitted to [facility] 3/31-4/4 for fever, found to have new LLL mass like consolidation, c/f malignancy, infectious workup negative, transferred to [facility] for bronchoscopy with interventional pulmonology.   (05 Apr 2025 12:19) now with fever and mild hypotension post bronchoscopy    PLAN    + Called by interventional pulm fellow and told about Fever 102F after  bronchoscopy ( not documented in flowsheets yet)- Hypotension-92/60 given IVF bolus by PACU. Will give more IVF and repeat blood culture/urine culture/ lactate- STAT as per Dr Martinez. Continue zosyn. CXR ordered as well case discussed with ID- add vancomycin x 1. MRSA PCR Will monitor patient closely.    Discussed with Dr Martinez and ID   Clinical findings, labs, tests, telemetry, and ekg reviewed with attending. Will monitor patient closely. + Called by interventional pulm fellow and told about Fever 102F after  bronchoscopy ( not documented in flowsheets yet)- Hypotension-92/60 given IVF bolus by PACU. Will give more IVF and repeat blood culture/urine culture/ lactate- STAT as per Dr Martinez. Continue agnes. CXR ordered as well case discussed with ID- add vancomycin x 1. MRSA PCR Will monitor patient closely.    ROS:  Denies fever, chills, diaphoresis , malaise, night sweat, generalized weakness, SOB cough, sputum production, wheezing, hemoptysis, CP, CARTWRIGHT, orthopnea, PND, palpitations, diaphoresis, lightheadedness, dizziness, syncope, edema. nausea, vomiting, diarrhea, constipation, abdominal pain, melena, hematochezia, dysphagia, dysuria, frequency, urgency, hematuria, nocturia.    -------------------------------------------------------------------------------------------------------------------------------------------------  Vital Signs:  Vital Signs Last 24 Hrs  T(C): 36.5 (04-07-25 @ 18:00), Max: 39 (04-07-25 @ 16:05)  T(F): 97.7 (04-07-25 @ 18:00), Max: 102.2 (04-07-25 @ 16:05)  HR: 89 (04-07-25 @ 18:15) (76 - 111)  BP: 109/45 (04-07-25 @ 18:15) (76/50 - 137/60)  RR: 31 (04-07-25 @ 18:15) (16 - 43)  SpO2: 100% (04-07-25 @ 18:15) (93% - 100%) on (O2)    Telemetry/Alarms:  General: WN/WD NAD  Neurology: Awake, nonfocal, AGARWAL x 4  Eyes: Scleras clear, PERRLA/ EOMI, Gross vision intact  ENT:Gross hearing intact, grossly patent pharynx, no stridor  Neck: Neck supple, trachea midline, No JVD,   Respiratory: CTA B/L, No wheezing, rales, rhonchi  CV: RRR, S1S2, no murmurs, rubs or gallops  Abdominal: Soft, NT, ND +BS,   Extremities: No edema, + peripheral pulses  Skin: No Rashes, Hematoma, Ecchymosis  Lymphatic: No Neck, axilla, groin LAD  Psych: Oriented x 3, normal affect  Relevant labs, radiology and Medications reviewed                        8.9    26.13 )-----------( 431      ( 07 Apr 2025 06:05 )             29.1     04-07    137  |  99  |  12  ----------------------------<  125[H]  3.8   |  21[L]  |  0.89    Ca    8.3[L]      07 Apr 2025 06:05  Phos  3.7     04-07  Mg     2.00     04-07      PT/INR - ( 07 Apr 2025 06:05 )   PT: 15.8 sec;   INR: 1.37 ratio         PTT - ( 07 Apr 2025 06:05 )  PTT:39.7 sec  MEDICATIONS  (STANDING):  albuterol/ipratropium for Nebulization 3 milliLiter(s) Nebulizer every 6 hours  fluticasone propionate/ salmeterol 250-50 MICROgram(s) Diskus 1 Dose(s) Inhalation two times a day  gabapentin 100 milliGRAM(s) Oral two times a day  lactated ringers Bolus 500 milliLiter(s) IV Bolus once  lactated ringers. 1000 milliLiter(s) (75 mL/Hr) IV Continuous <Continuous>  lidocaine   4% Patch 1 Patch Transdermal daily  losartan 50 milliGRAM(s) Oral daily  pantoprazole    Tablet 40 milliGRAM(s) Oral before breakfast  piperacillin/tazobactam IVPB.. 3.375 Gram(s) IV Intermittent every 8 hours  polyethylene glycol 3350 17 Gram(s) Oral daily  rosuvastatin 40 milliGRAM(s) Oral at bedtime  senna 2 Tablet(s) Oral at bedtime  vancomycin  IVPB 1000 milliGRAM(s) IV Intermittent once    MEDICATIONS  (PRN):  acetaminophen     Tablet .. 650 milliGRAM(s) Oral every 6 hours PRN Temp greater or equal to 38C (100.4F), Mild Pain (1 - 3), Moderate Pain (4 - 6)  fentaNYL    Injectable 25 MICROGram(s) IV Push every 5 minutes PRN Severe Pain (7 - 10)  guaiFENesin Oral Liquid (Sugar-Free) 100 milliGRAM(s) Oral every 6 hours PRN Cough  melatonin 3 milliGRAM(s) Oral at bedtime PRN Insomnia  ondansetron Injectable 4 milliGRAM(s) IV Push once PRN Nausea and/or Vomiting  oxyCODONE    IR 5 milliGRAM(s) Oral once PRN Mild Pain (1 - 3)  oxyCODONE    IR 10 milliGRAM(s) Oral once PRN Moderate Pain (4 - 6)  promethazine 25 milliGRAM(s) Oral every 6 hours PRN allergies  traMADol 50 milliGRAM(s) Oral every 8 hours PRN Severe Pain (7 - 10)    I&O's Summary    06 Apr 2025 07:01  -  07 Apr 2025 07:00  --------------------------------------------------------  IN: 1150 mL / OUT: 1400 mL / NET: -250 mL    07 Apr 2025 07:01  -  07 Apr 2025 18:25  --------------------------------------------------------  IN: 1275 mL / OUT: 120 mL / NET: 1155 mL      I reviewed the above lab results, tests, telemetry, and  EKG interpretation. .  Assessment  84y Female  w/ PAST MEDICAL & SURGICAL HISTORY:  HTN (hypertension)  COPD (chronic obstructive pulmonary disease)  HLD (hyperlipidemia)  S/P TAVR (transcatheter aortic valve replacement)    admitted with complaints of Patient is a 84y old  Female who presents with a chief complaint of Per chart, Pt is 84F with hx of TAVR (2019), COPD (on PRN O2), HLD, S1 Fracture (02/2025 no surgical intervention) initially admitted to [facility] 3/31-4/4 for fever, found to have new LLL mass like consolidation, c/f malignancy, infectious workup negative, transferred to [facility] for bronchoscopy with interventional pulmonology.   (05 Apr 2025 12:19) sepsis on zosyn now with fever and mild hypotension post bronchoscopy monitor for impending severe sepsis/septic shock    PLAN    + Called by interventional pulm fellow and told about Fever 102F after  bronchoscopy ( not documented in flowsheets yet)- Hypotension-92/60 given IVF bolus by PACU. Will give more IVF and repeat blood culture/urine culture/ lactate- STAT as per Dr Martinez. Continue zosyn. CXR ordered as well case discussed with ID- add vancomycin x 1. MRSA PCR Will monitor patient closely.    Discussed with Dr Martinez and ID   Clinical findings, labs, tests, telemetry, and ekg reviewed with attending. Will monitor patient closely.

## 2025-04-07 NOTE — CONSULT NOTE ADULT - SUBJECTIVE AND OBJECTIVE BOX
CHIEF COMPLAINT:Patient is a 84y old  Female who presents with a chief complaint of Per chart, Pt is 84F with hx of TAVR (), COPD (on PRN O2), HLD, S1 Fracture (2025 no surgical intervention) initially admitted to [facility] 3/31- for fever, found to have new LLL mass like consolidation, c/f malignancy, infectious workup negative, transferred to [facility] for bronchoscopy with interventional pulmonology.   (2025 12:19)      HPI:  84F with hx of TAVR (2019), COPD (on PRN O2), HLD, S1 Fracture (2025 no surgical intervention) initially admitted to [facility] 3/31- for fever, found to have new LL mass like consolidation, c/f malignancy, infectious workup negative, transferred to [facility] for bronchoscopy with interventional pulmonology. Patient denies fevers, chills, cp, sob, abdominal pain, n/v/d, dysuria, sick contacts, recent travel.     Infectious workup at [facility] - BCx, UA, CXR neg. QuantiFERON-TB Gold, fungal and viral serology pending. ID requests AFB, bacterial/fungal cultures, and flow cytometry of the biopsy specimen.    (2025 15:56)    IP consulted for sampling of LLL nodule.    PAST MEDICAL & SURGICAL HISTORY:  HTN (hypertension)      COPD (chronic obstructive pulmonary disease)      HLD (hyperlipidemia)      S/P TAVR (transcatheter aortic valve replacement)    SHx- former smoker      FAMILY HISTORY:        Allergies    tetracycline (Rash; Urticaria)    Intolerances        HOME MEDICATIONS:  Home Medications:  acetaminophen 325 mg oral tablet: 2 tab(s) orally every 6 hours As needed Temp greater or equal to 38C (100.4F), Mild Pain (1 - 3) (2025 15:23)  fluticasone-salmeterol 250 mcg-50 mcg/inh inhalation powder: 1 puff(s) inhaled 2 times a day (2025 15:23)  gabapentin 100 mg oral capsule: 1 cap(s) orally 2 times a day (2025 15:23)  guaiFENesin 100 mg/5 mL oral liquid: 5 milliliter(s) orally every 6 hours As needed Cough (2025 15:23)  ipratropium-albuterol 0.5 mg-2.5 mg/3 mL inhalation solution: 3 milliliter(s) inhaled every 6 hours (2025 15:23)  lidocaine 4% topical film: Apply topically to affected area once a day (2025 15:23)  losartan 50 mg oral tablet: 1 tab(s) orally once a day (2025 15:23)  Lovenox 40 mg/0.4 mL injectable solution: 40 milligram(s) subcutaneously once a day (2025 15:23)  melatonin 3 mg oral tablet: 1 tab(s) orally once a day (at bedtime) As needed Insomnia (2025 15:23)  pantoprazole 40 mg oral delayed release tablet: 1 tab(s) orally once a day (before a meal) (2025 15:23)  polyethylene glycol 3350 oral powder for reconstitution: 17 gram(s) orally once a day (2025 15:23)  promethazine 25 mg oral tablet: 1 tab(s) orally every 6 hours As needed allergics (2025 15:23)  rosuvastatin 40 mg oral tablet: 1 tab(s) orally once a day (at bedtime) (2025 15:23)  senna leaf extract oral tablet: 2 tab(s) orally once a day (at bedtime) (2025 15:23)  traMADol 50 mg oral tablet: 1 tab(s) orally every 8 hours As needed Moderate Pain (4 - 6) (2025 15:23)  Zosyn 3 g-0.375 g/50 mL intravenous solution: 3.375 gram(s) intravenously every 8 hours x 7 days - started on 25 (2025 15:23)      REVIEW OF SYSTEMS:  Constitutional: [x ] negative [ ] fevers [ ] chills [ ] weight loss [ ] weight gain  HEENT: [x ] negative [ ] dry eyes [ ] eye irritation [ ] postnasal drip [ ] nasal congestion  CV: [ x] negative  [ ] chest pain [ ] orthopnea [ ] palpitations [ ] murmur  Resp: [x ] negative [ ] cough [ ] shortness of breath [ ] dyspnea [ ] wheezing [ ] sputum [ ] hemoptysis  GI: [ ] negative [ ] nausea [ ] vomiting [ ] diarrhea [ ] constipation [ ] abd pain [ ] dysphagia   : [ ] negative [ ] dysuria [ ] nocturia [ ] hematuria [ ] increased urinary frequency  Musculoskeletal: [ ] negative [ ] back pain [ ] myalgias [ ] arthralgias [ ] fracture  Skin: [ ] negative [ ] rash [ ] itch  Neurological: [ ] negative [ ] headache [ ] dizziness [ ] syncope [ ] weakness [ ] numbness  Psychiatric: [ ] negative [ ] anxiety [ ] depression  Endocrine: [ ] negative [ ] diabetes [ ] thyroid problem  Hematologic/Lymphatic: [ ] negative [ ] anemia [ ] bleeding problem  Allergic/Immunologic: [ ] negative [ ] itchy eyes [ ] nasal discharge [ ] hives [ ] angioedema  x[ ] All other systems negative  [ ] Unable to assess ROS because ________    OBJECTIVE:  ICU Vital Signs Last 24 Hrs  T(C): 36.9 (2025 12:22), Max: 37.7 (2025 22:30)  T(F): 98.4 (2025 12:22), Max: 99.9 (2025 22:30)  HR: 93 (2025 12:22) (76 - 102)  BP: 113/48 (2025 12:22) (92/37 - 137/60)  BP(mean): --  ABP: --  ABP(mean): --  RR: 17 (2025 12:22) (16 - 20)  SpO2: 96% (2025 12:22) (96% - 99%)    O2 Parameters below as of 2025 12:22  Patient On (Oxygen Delivery Method): nasal cannula  O2 Flow (L/min): 3            - @ 07:01  -   @ 07:00  --------------------------------------------------------  IN: 1150 mL / OUT: 1400 mL / NET: -250 mL      CAPILLARY BLOOD GLUCOSE          PHYSICAL EXAM:  Gen: NAD, WD, WN  HEENT: MMM, PERRL, EOMI  Neck: No JVP elevation  CV: RRR, no m/r/g  Lung: CTAB  Abd: Soft, NT, ND  Ext: WWP, no CCE  Skin: No rash  Neuro: A&Ox3, no focal deficits    HOSPITAL MEDICATIONS:  Standing Meds:  albuterol/ipratropium for Nebulization 3 milliLiter(s) Nebulizer every 6 hours  fluticasone propionate/ salmeterol 250-50 MICROgram(s) Diskus 1 Dose(s) Inhalation two times a day  gabapentin 100 milliGRAM(s) Oral two times a day  lidocaine   4% Patch 1 Patch Transdermal daily  losartan 50 milliGRAM(s) Oral daily  pantoprazole    Tablet 40 milliGRAM(s) Oral before breakfast  piperacillin/tazobactam IVPB.. 3.375 Gram(s) IV Intermittent every 8 hours  polyethylene glycol 3350 17 Gram(s) Oral daily  rosuvastatin 40 milliGRAM(s) Oral at bedtime  senna 2 Tablet(s) Oral at bedtime      PRN Meds:  acetaminophen     Tablet .. 650 milliGRAM(s) Oral every 6 hours PRN  guaiFENesin Oral Liquid (Sugar-Free) 100 milliGRAM(s) Oral every 6 hours PRN  melatonin 3 milliGRAM(s) Oral at bedtime PRN  promethazine 25 milliGRAM(s) Oral every 6 hours PRN  traMADol 50 milliGRAM(s) Oral every 8 hours PRN      LABS:    The Labs were reviewed by me   The Radiology was reviewed by me    EKG tracing reviewed by me        137  |  99  |  12  ----------------------------<  125[H]  3.8   |  21[L]  |  0.89      139  |  101  |  10  ----------------------------<  103[H]  4.0   |  23  |  0.78      138  |  100  |  8   ----------------------------<  111[H]  3.4[L]   |  24  |  0.72    Ca    8.3[L]      2025 06:05  Ca    8.4      2025 07:31  Ca    8.5      2025 06:00  Phos  3.7       Mg     2.00           Magnesium: 2.00 mg/dL (25 @ 06:05)  Magnesium: 2.20 mg/dL (25 @ 07:31)  Magnesium: 2.10 mg/dL (25 @ 06:00)    Phosphorus: 3.7 mg/dL (25 @ 06:05)  Phosphorus: 4.1 mg/dL (25 @ 07:31)  Phosphorus: 3.1 mg/dL (25 @ 06:00)      PT/INR - ( 2025 06:05 )   PT: 15.8 sec;   INR: 1.37 ratio         PTT - ( 2025 06:05 )  PTT:39.7 sec              Urinalysis Basic - ( 2025 06:12 )    Color: Dark Yellow / Appearance: Turbid / S.030 / pH: x  Gluc: x / Ketone: Negative mg/dL  / Bili: Small / Urobili: 1.0 mg/dL   Blood: x / Protein: 30 mg/dL / Nitrite: Negative   Leuk Esterase: Moderate / RBC: 66 /HPF / WBC 4 /HPF   Sq Epi: x / Non Sq Epi: 2 /HPF / Bacteria: Many /HPF                              8.9    26.13 )-----------( 431      ( 2025 06:05 )             29.1                         9.0    15.93 )-----------( 394      ( 2025 07:31 )             29.6                         8.7    16.67 )-----------( 394      ( 2025 06:00 )             28.7     CAPILLARY BLOOD GLUCOSE            MICROBIOLOGY:       RADIOLOGY:  [ ] Reviewed and interpreted by me    PULMONARY FUNCTION TESTS:    EKG:

## 2025-04-07 NOTE — PRE-OP CHECKLIST - LOOSE TEETH
no
41yo single AAF, unemployed, domiciled in shelter, with 5 children (currently living with 23yoF and 17yoM and a grandchild), reporting a history of "schizoaffective bipolar" and PTSD, with approximately 10 psychiatric hospitalizations, multiple suicide attempts/gestures (cutting, overdosing, 'turning gas on'), polysubstance dependence with multiple detoxes/rehabs but denying seizures, history of multiple arrests for drug possession (denies active charges), PMH HTN, brought in by EMS from son's high school after reporting to a guidance counselor that she felt depressed. She does not meet full criteria for a major depressive episode, is not currently suicidal, homicidal, violent, manic or psychotic. She endorses symptoms primarily in the context of psychosocial stressors (shelter living, unemployment) and is seeking resources. There is no indication for psychiatric admission at this time. SW to provide referrals.

## 2025-04-07 NOTE — PRE-OP CHECKLIST - NS PREOP CHK HIBICLENS NA
GENERAL SURGERY TRAUMA CONSULTATION    CHIEF COMPLAINT:  Motor vehicle collision    Time patient seen: 1215    HISTORY OF PRESENT ILLNESS:  I was asked to see Jing Gill at the request of Shadia Fisher [phone] with any emergent concerns.

## 2025-04-07 NOTE — PROGRESS NOTE ADULT - PROBLEM SELECTOR PLAN 3
WBC 18 (was 65 on admission)  - infectious workup unrevealing - BCx/UA/CXR neg, QuantiFERON-TB Gold, fungal and viral serology pending  - ID evaluated at [facility] zosyn x 7 days, rec flow cytometry, AFB, bacterial and fungal cx to be sent with biopsy  - trend WBC WBC 26 today  (was 65 on admission)  - infectious workup unrevealing - BCx/UA/CXR neg, QuantiFERON-TB Gold, fungal and viral serology pending  - ID evaluated at [facility] zosyn x 7 days, rec flow cytometry, AFB, bacterial and fungal cx to be sent with biopsy  -worsening leucocytosis, per daughter , her baseline is 14-15  - pt with  EBV IGM+, will discuss with ID if concern for acute infection  - trend WBC

## 2025-04-07 NOTE — PROGRESS NOTE ADULT - PROBLEM SELECTOR PLAN 2
febrile on admission to [facility]  - infectious workup unrevealing - BCx/UA/CXR neg, QuantiFERON-TB Gold, fungal and viral serology pending  - CT chest with LLL lung nodule - management as below, CT CAP without infectious etiology   - ID (Dr. Bui) evaluated at [facility] - rec flow cytometry, AFB, bacterial and fungal cx to be sent with biopsy, , if any of these results come back positive will consult ID  - continue Zosyn (planned for empiric 7 day course per ID at [facility]) - through 4/7 febrile on admission to [facility]  - infectious workup unrevealing - BCx/UA/CXR neg, QuantiFERON-TB Gold, fungal and viral serology pending  - CT chest with LLL lung nodule - management as below, CT CAP without infectious etiology   - ID (Dr. Bui) evaluated at [facility] - rec flow cytometry, AFB, bacterial and fungal cx to be sent with biopsy, , if any of these results come back positive will consult ID  - continue Zosyn (planned for empiric 7 day course per ID at [facility]) - through 4/7  -worsening leucocytosis, per daughter , her baseline is 14-15  - pt with  EBV IGM+, will discuss with ID if concern for acute infection

## 2025-04-07 NOTE — PROGRESS NOTE ADULT - PROBLEM SELECTOR PLAN 5
BP stable  - continue losartan  - monitor vital signs pt was noted to be hypotensive this morning   - on  losartan 50 mg to be held for SBP<110   - monitor vital signs

## 2025-04-07 NOTE — PROGRESS NOTE ADULT - SUBJECTIVE AND OBJECTIVE BOX
Patient is a 84y old  Female who presents with a chief complaint of Per chart, Pt is 84F with hx of TAVR (2019), COPD (on PRN O2), HLD, S1 Fracture (2025 no surgical intervention) initially admitted to [facility] 3/31- for fever, found to have new LLL mass like consolidation, c/f malignancy, infectious workup negative, transferred to [facility] for bronchoscopy with interventional pulmonology.   (2025 12:19)      SUBJECTIVE / OVERNIGHT EVENTS:    MEDICATIONS  (STANDING):  albuterol/ipratropium for Nebulization 3 milliLiter(s) Nebulizer every 6 hours  fluticasone propionate/ salmeterol 250-50 MICROgram(s) Diskus 1 Dose(s) Inhalation two times a day  gabapentin 100 milliGRAM(s) Oral two times a day  lidocaine   4% Patch 1 Patch Transdermal daily  losartan 50 milliGRAM(s) Oral daily  pantoprazole    Tablet 40 milliGRAM(s) Oral before breakfast  piperacillin/tazobactam IVPB.. 3.375 Gram(s) IV Intermittent every 8 hours  polyethylene glycol 3350 17 Gram(s) Oral daily  rosuvastatin 40 milliGRAM(s) Oral at bedtime  senna 2 Tablet(s) Oral at bedtime    MEDICATIONS  (PRN):  acetaminophen     Tablet .. 650 milliGRAM(s) Oral every 6 hours PRN Temp greater or equal to 38C (100.4F), Mild Pain (1 - 3), Moderate Pain (4 - 6)  guaiFENesin Oral Liquid (Sugar-Free) 100 milliGRAM(s) Oral every 6 hours PRN Cough  melatonin 3 milliGRAM(s) Oral at bedtime PRN Insomnia  promethazine 25 milliGRAM(s) Oral every 6 hours PRN allergies  traMADol 50 milliGRAM(s) Oral every 8 hours PRN Severe Pain (7 - 10)      Vital Signs Last 24 Hrs  T(C): 36.7 (2025 05:45), Max: 37.7 (2025 22:30)  T(F): 98 (2025 05:45), Max: 99.9 (2025 22:30)  HR: 98 (2025 05:45) (76 - 102)  BP: 122/50 (2025 05:45) (121/55 - 137/60)  BP(mean): --  RR: 17 (2025 05:45) (17 - 20)  SpO2: 98% (2025 05:45) (96% - 99%)    Parameters below as of 2025 05:45  Patient On (Oxygen Delivery Method): nasal cannula      CAPILLARY BLOOD GLUCOSE        I&O's Summary    2025 07:01  -  2025 07:00  --------------------------------------------------------  IN: 1150 mL / OUT: 1400 mL / NET: -250 mL        PHYSICAL EXAM:  GENERAL: NAD, well-developed  HEAD:  Atraumatic, Normocephalic  EYES: EOMI, PERRLA, conjunctiva and sclera clear  NECK: Supple, No JVD  CHEST/LUNG: Clear to auscultation bilaterally; No wheeze  HEART: Regular rate and rhythm; No murmurs, rubs, or gallops  ABDOMEN: Soft, Nontender, Nondistended; Bowel sounds present  EXTREMITIES:  2+ Peripheral Pulses, No clubbing, cyanosis, or edema  PSYCH: AAOx3  NEUROLOGY: non-focal  SKIN: No rashes or lesions    LABS:                        8.9    26.13 )-----------( 431      ( 2025 06:05 )             29.1     04-07    137  |  99  |  12  ----------------------------<  125[H]  3.8   |  21[L]  |  0.89    Ca    8.3[L]      2025 06:05  Phos  3.7     04-07  Mg     2.00     04-07      PT/INR - ( 2025 06:05 )   PT: 15.8 sec;   INR: 1.37 ratio         PTT - ( 2025 06:05 )  PTT:39.7 sec      Urinalysis Basic - ( 2025 06:12 )    Color: Dark Yellow / Appearance: Turbid / S.030 / pH: x  Gluc: x / Ketone: Negative mg/dL  / Bili: Small / Urobili: 1.0 mg/dL   Blood: x / Protein: 30 mg/dL / Nitrite: Negative   Leuk Esterase: Moderate / RBC: 66 /HPF / WBC 4 /HPF   Sq Epi: x / Non Sq Epi: 2 /HPF / Bacteria: Many /HPF        RADIOLOGY & ADDITIONAL TESTS:    Imaging Personally Reviewed:    Consultant(s) Notes Reviewed:      Care Discussed with Consultants/Other Providers:   Patient is a 84y old  Female who presents with a chief complaint of Per chart, Pt is 84F with hx of TAVR (2019), COPD (on PRN O2), HLD, S1 Fracture (2025 no surgical intervention) initially admitted to [facility] 3/31- for fever, found to have new LLL mass like consolidation, c/f malignancy, infectious workup negative, transferred to [facility] for bronchoscopy with interventional pulmonology.   (2025 12:19)      SUBJECTIVE / OVERNIGHT EVENTS:  patient seen and examined by bedside, pt more sleepy today per daughter, pt afebrile , was noted to be hypotensive this morning. Pt NPO for procedure today     MEDICATIONS  (STANDING):  albuterol/ipratropium for Nebulization 3 milliLiter(s) Nebulizer every 6 hours  fluticasone propionate/ salmeterol 250-50 MICROgram(s) Diskus 1 Dose(s) Inhalation two times a day  gabapentin 100 milliGRAM(s) Oral two times a day  lidocaine   4% Patch 1 Patch Transdermal daily  losartan 50 milliGRAM(s) Oral daily  pantoprazole    Tablet 40 milliGRAM(s) Oral before breakfast  piperacillin/tazobactam IVPB.. 3.375 Gram(s) IV Intermittent every 8 hours  polyethylene glycol 3350 17 Gram(s) Oral daily  rosuvastatin 40 milliGRAM(s) Oral at bedtime  senna 2 Tablet(s) Oral at bedtime    MEDICATIONS  (PRN):  acetaminophen     Tablet .. 650 milliGRAM(s) Oral every 6 hours PRN Temp greater or equal to 38C (100.4F), Mild Pain (1 - 3), Moderate Pain (4 - 6)  guaiFENesin Oral Liquid (Sugar-Free) 100 milliGRAM(s) Oral every 6 hours PRN Cough  melatonin 3 milliGRAM(s) Oral at bedtime PRN Insomnia  promethazine 25 milliGRAM(s) Oral every 6 hours PRN allergies  traMADol 50 milliGRAM(s) Oral every 8 hours PRN Severe Pain (7 - 10)      Vital Signs Last 24 Hrs  T(C): 36.7 (2025 05:45), Max: 37.7 (2025 22:30)  T(F): 98 (2025 05:45), Max: 99.9 (2025 22:30)  HR: 98 (2025 05:45) (76 - 102)  BP: 122/50 (2025 05:45) (121/55 - 137/60)  BP(mean): --  RR: 17 (2025 05:45) (17 - 20)  SpO2: 98% (2025 05:45) (96% - 99%)    Parameters below as of 2025 05:45  Patient On (Oxygen Delivery Method): nasal cannula      CAPILLARY BLOOD GLUCOSE        I&O's Summary    2025 07:01  -  2025 07:00  --------------------------------------------------------  IN: 1150 mL / OUT: 1400 mL / NET: -250 mL        PHYSICAL EXAM:  CONSTITUTIONAL: NAD,  EYES: PERRLA; conjunctiva and sclera clear  NECK: Supple, no palpable masses;  RESPIRATORY: Normal respiratory effort; basilar crackles  bilaterally  CARDIOVASCULAR: Regular rate and rhythm, normal S1 and S2, no murmur/rub/gallop; No lower extremity edema; Peripheral pulses are 2+ bilaterally  ABDOMEN: Nontender to palpation, normoactive bowel sounds, no rebound/guarding;   MUSCLOSKELETAL:   no clubbing or cyanosis of digits; no joint swelling or tenderness to palpation  PSYCH: A+O to person, place, and time; affect appropriate  NEUROLOGY: CN 2-12 are intact and symmetric; no gross sensory deficits;   SKIN: No rashes;       LABS:                        8.9    26.13 )-----------( 431      ( 2025 06:05 )             29.1     04-07    137  |  99  |  12  ----------------------------<  125[H]  3.8   |  21[L]  |  0.89    Ca    8.3[L]      2025 06:05  Phos  3.7     04-07  Mg     2.00     04-07      PT/INR - ( 2025 06:05 )   PT: 15.8 sec;   INR: 1.37 ratio         PTT - ( 2025 06:05 )  PTT:39.7 sec      Urinalysis Basic - ( 2025 06:12 )    Color: Dark Yellow / Appearance: Turbid / S.030 / pH: x  Gluc: x / Ketone: Negative mg/dL  / Bili: Small / Urobili: 1.0 mg/dL   Blood: x / Protein: 30 mg/dL / Nitrite: Negative   Leuk Esterase: Moderate / RBC: 66 /HPF / WBC 4 /HPF   Sq Epi: x / Non Sq Epi: 2 /HPF / Bacteria: Many /HPF        RADIOLOGY & ADDITIONAL TESTS:    Imaging Personally Reviewed:    Consultant(s) Notes Reviewed:      Care Discussed with Consultants/Other Providers:

## 2025-04-07 NOTE — PROGRESS NOTE ADULT - PROBLEM SELECTOR PLAN 7
DVT ppx: lovenox  DIET: DASH   DISPO: PT DVT ppx: lovenox  DIET: DASH   DISPO: pending clinical course   plan of care d/w pt and daughter at bedside   case d/w ACP

## 2025-04-08 DIAGNOSIS — D64.9 ANEMIA, UNSPECIFIED: ICD-10-CM

## 2025-04-08 LAB
ALBUMIN SERPL ELPH-MCNC: 2.9 G/DL — LOW (ref 3.3–5)
ALP SERPL-CCNC: 60 U/L — SIGNIFICANT CHANGE UP (ref 40–120)
ALT FLD-CCNC: 13 U/L — SIGNIFICANT CHANGE UP (ref 4–33)
ANION GAP SERPL CALC-SCNC: 13 MMOL/L — SIGNIFICANT CHANGE UP (ref 7–14)
ANISOCYTOSIS BLD QL: SLIGHT — SIGNIFICANT CHANGE UP
AST SERPL-CCNC: 10 U/L — SIGNIFICANT CHANGE UP (ref 4–32)
BASOPHILS # BLD AUTO: 0 K/UL — SIGNIFICANT CHANGE UP (ref 0–0.2)
BASOPHILS NFR BLD AUTO: 0 % — SIGNIFICANT CHANGE UP (ref 0–2)
BILIRUB SERPL-MCNC: 0.3 MG/DL — SIGNIFICANT CHANGE UP (ref 0.2–1.2)
BUN SERPL-MCNC: 22 MG/DL — SIGNIFICANT CHANGE UP (ref 7–23)
CALCIUM SERPL-MCNC: 7.7 MG/DL — LOW (ref 8.4–10.5)
CHLORIDE SERPL-SCNC: 100 MMOL/L — SIGNIFICANT CHANGE UP (ref 98–107)
CO2 SERPL-SCNC: 25 MMOL/L — SIGNIFICANT CHANGE UP (ref 22–31)
CREAT SERPL-MCNC: 1.08 MG/DL — SIGNIFICANT CHANGE UP (ref 0.5–1.3)
EGFR: 51 ML/MIN/1.73M2 — LOW
EGFR: 51 ML/MIN/1.73M2 — LOW
EOSINOPHIL # BLD AUTO: 0 K/UL — SIGNIFICANT CHANGE UP (ref 0–0.5)
EOSINOPHIL NFR BLD AUTO: 0 % — SIGNIFICANT CHANGE UP (ref 0–6)
GIANT PLATELETS BLD QL SMEAR: PRESENT — SIGNIFICANT CHANGE UP
GLUCOSE SERPL-MCNC: 113 MG/DL — HIGH (ref 70–99)
GRAM STN FLD: SIGNIFICANT CHANGE UP
H CAPSUL AG SPEC-ACNC: SIGNIFICANT CHANGE UP
H CAPSUL AG UR QL IA: SIGNIFICANT CHANGE UP
HCT VFR BLD CALC: 24.3 % — LOW (ref 34.5–45)
HCT VFR BLD CALC: 27.4 % — LOW (ref 34.5–45)
HGB BLD-MCNC: 7.2 G/DL — LOW (ref 11.5–15.5)
HGB BLD-MCNC: 8.2 G/DL — LOW (ref 11.5–15.5)
HYPOCHROMIA BLD QL: SLIGHT — SIGNIFICANT CHANGE UP
IANC: 10.79 K/UL — HIGH (ref 1.8–7.4)
LYMPHOCYTES # BLD AUTO: 0.58 K/UL — LOW (ref 1–3.3)
LYMPHOCYTES # BLD AUTO: 3.5 % — LOW (ref 13–44)
MANUAL SMEAR VERIFICATION: SIGNIFICANT CHANGE UP
MCHC RBC-ENTMCNC: 16.5 PG — LOW (ref 27–34)
MCHC RBC-ENTMCNC: 17 PG — LOW (ref 27–34)
MCHC RBC-ENTMCNC: 29.6 G/DL — LOW (ref 32–36)
MCHC RBC-ENTMCNC: 29.9 G/DL — LOW (ref 32–36)
MCV RBC AUTO: 55.6 FL — LOW (ref 80–100)
MCV RBC AUTO: 56.8 FL — LOW (ref 80–100)
METAMYELOCYTES # FLD: 0.9 % — SIGNIFICANT CHANGE UP (ref 0–1)
METAMYELOCYTES NFR BLD: 0.9 % — SIGNIFICANT CHANGE UP (ref 0–1)
MICROCYTES BLD QL: SLIGHT — SIGNIFICANT CHANGE UP
MONOCYTES # BLD AUTO: 2.94 K/UL — HIGH (ref 0–0.9)
MONOCYTES NFR BLD AUTO: 17.7 % — HIGH (ref 2–14)
MYELOCYTES NFR BLD: 1.8 % — HIGH (ref 0–0)
NEUTROPHILS # BLD AUTO: 12.65 K/UL — HIGH (ref 1.8–7.4)
NEUTROPHILS NFR BLD AUTO: 75.2 % — SIGNIFICANT CHANGE UP (ref 43–77)
NEUTS BAND # BLD: 0.9 % — SIGNIFICANT CHANGE UP (ref 0–6)
NEUTS BAND NFR BLD: 0.9 % — SIGNIFICANT CHANGE UP (ref 0–6)
NIGHT BLUE STAIN TISS: SIGNIFICANT CHANGE UP
NIGHT BLUE STAIN TISS: SIGNIFICANT CHANGE UP
NRBC # BLD AUTO: 0 K/UL — SIGNIFICANT CHANGE UP (ref 0–0)
NRBC # FLD: 0 K/UL — SIGNIFICANT CHANGE UP (ref 0–0)
NRBC BLD AUTO-RTO: 0 /100 WBCS — SIGNIFICANT CHANGE UP (ref 0–0)
PLAT MORPH BLD: NORMAL — SIGNIFICANT CHANGE UP
PLATELET # BLD AUTO: 340 K/UL — SIGNIFICANT CHANGE UP (ref 150–400)
PLATELET # BLD AUTO: 365 K/UL — SIGNIFICANT CHANGE UP (ref 150–400)
PLATELET COUNT - ESTIMATE: NORMAL — SIGNIFICANT CHANGE UP
POIKILOCYTOSIS BLD QL AUTO: SLIGHT — SIGNIFICANT CHANGE UP
POTASSIUM SERPL-MCNC: 4.2 MMOL/L — SIGNIFICANT CHANGE UP (ref 3.5–5.3)
POTASSIUM SERPL-SCNC: 4.2 MMOL/L — SIGNIFICANT CHANGE UP (ref 3.5–5.3)
PROT SERPL-MCNC: 5.8 G/DL — LOW (ref 6–8.3)
RBC # BLD: 4.37 M/UL — SIGNIFICANT CHANGE UP (ref 3.8–5.2)
RBC # BLD: 4.82 M/UL — SIGNIFICANT CHANGE UP (ref 3.8–5.2)
RBC # FLD: 19.7 % — HIGH (ref 10.3–14.5)
RBC # FLD: 20.7 % — HIGH (ref 10.3–14.5)
RBC BLD AUTO: ABNORMAL
SMUDGE CELLS # BLD: PRESENT — SIGNIFICANT CHANGE UP
SODIUM SERPL-SCNC: 138 MMOL/L — SIGNIFICANT CHANGE UP (ref 135–145)
SPECIMEN SOURCE: SIGNIFICANT CHANGE UP
TARGETS BLD QL SMEAR: SLIGHT — SIGNIFICANT CHANGE UP
WBC # BLD: 16.62 K/UL — HIGH (ref 3.8–10.5)
WBC # BLD: 19.62 K/UL — HIGH (ref 3.8–10.5)
WBC # FLD AUTO: 16.62 K/UL — HIGH (ref 3.8–10.5)
WBC # FLD AUTO: 19.62 K/UL — HIGH (ref 3.8–10.5)

## 2025-04-08 PROCEDURE — 99222 1ST HOSP IP/OBS MODERATE 55: CPT | Mod: GC

## 2025-04-08 PROCEDURE — G0545: CPT

## 2025-04-08 PROCEDURE — 99233 SBSQ HOSP IP/OBS HIGH 50: CPT

## 2025-04-08 PROCEDURE — 99233 SBSQ HOSP IP/OBS HIGH 50: CPT | Mod: GC

## 2025-04-08 RX ORDER — CLOTRIMAZOLE 1 G/100G
1 CREAM TOPICAL AT BEDTIME
Refills: 0 | Status: COMPLETED | OUTPATIENT
Start: 2025-04-08 | End: 2025-04-14

## 2025-04-08 RX ORDER — FLUCONAZOLE 150 MG
150 TABLET ORAL ONCE
Refills: 0 | Status: COMPLETED | OUTPATIENT
Start: 2025-04-08 | End: 2025-04-08

## 2025-04-08 RX ORDER — PIPERACILLIN-TAZO-DEXTROSE,ISO 2.25G/50ML
3.38 IV SOLUTION, PIGGYBACK PREMIX FROZEN(ML) INTRAVENOUS EVERY 8 HOURS
Refills: 0 | Status: DISCONTINUED | OUTPATIENT
Start: 2025-04-08 | End: 2025-04-09

## 2025-04-08 RX ADMIN — Medication 1 LOZENGE: at 06:42

## 2025-04-08 RX ADMIN — LOSARTAN POTASSIUM 50 MILLIGRAM(S): 100 TABLET, FILM COATED ORAL at 06:41

## 2025-04-08 RX ADMIN — ROSUVASTATIN CALCIUM 40 MILLIGRAM(S): 20 TABLET, FILM COATED ORAL at 21:59

## 2025-04-08 RX ADMIN — DEXTROMETHORPHAN HBR, GUAIFENESIN 100 MILLIGRAM(S): 200 LIQUID ORAL at 14:18

## 2025-04-08 RX ADMIN — IPRATROPIUM BROMIDE AND ALBUTEROL SULFATE 3 MILLILITER(S): .5; 2.5 SOLUTION RESPIRATORY (INHALATION) at 03:29

## 2025-04-08 RX ADMIN — Medication 40 MILLIGRAM(S): at 06:43

## 2025-04-08 RX ADMIN — Medication 1 DOSE(S): at 21:58

## 2025-04-08 RX ADMIN — Medication 1 DOSE(S): at 09:24

## 2025-04-08 RX ADMIN — Medication 25 GRAM(S): at 21:59

## 2025-04-08 RX ADMIN — GABAPENTIN 100 MILLIGRAM(S): 400 CAPSULE ORAL at 06:42

## 2025-04-08 RX ADMIN — Medication 25 GRAM(S): at 06:42

## 2025-04-08 RX ADMIN — GABAPENTIN 100 MILLIGRAM(S): 400 CAPSULE ORAL at 18:14

## 2025-04-08 RX ADMIN — Medication 25 GRAM(S): at 13:39

## 2025-04-08 RX ADMIN — Medication 1 LOZENGE: at 18:14

## 2025-04-08 RX ADMIN — Medication 150 MILLIGRAM(S): at 09:24

## 2025-04-08 RX ADMIN — IPRATROPIUM BROMIDE AND ALBUTEROL SULFATE 3 MILLILITER(S): .5; 2.5 SOLUTION RESPIRATORY (INHALATION) at 21:19

## 2025-04-08 RX ADMIN — IPRATROPIUM BROMIDE AND ALBUTEROL SULFATE 3 MILLILITER(S): .5; 2.5 SOLUTION RESPIRATORY (INHALATION) at 14:31

## 2025-04-08 RX ADMIN — DEXTROMETHORPHAN HBR, GUAIFENESIN 100 MILLIGRAM(S): 200 LIQUID ORAL at 22:14

## 2025-04-08 RX ADMIN — Medication 25 GRAM(S): at 14:18

## 2025-04-08 NOTE — CONSULT NOTE ADULT - ATTENDING COMMENTS
84F with hx TAVR (2019), COPD (on PRN O2), HLD, S1 Fracture (02/2025 no surgical intervention) initially admitted to [facility] 3/31-4/4 after a fall.  Noted to have fever, found to have new LLL mass like consolidation, c/f malignancy, infectious workup negative.  Patient was transferred to [facility] for bronchoscopy with interventional pulmonology.     Infectious workup at [facility] - BCx, UA, CXR neg. QuantiFERON-TB Gold, fungal and viral serology pending. ID requests AFB, bacterial/fungal cultures, and flow cytometry of the biopsy specimen. 84F with hx TAVR (2019), COPD (on PRN O2), HLD, S1 Fracture (02/2025 no surgical intervention) initially admitted to [facility] 3/31-4/4 after a fall.  Noted to have fever, found to have new LLL mass like consolidation, c/f malignancy, infectious workup negative [(-) BC / UA / CXR].  Patient was transferred to [facility] for bronchoscopy with interventional pulmonology.  Underwent bronchoscopy and biopsy 4/7 complicated by isolated fever 102.  BC drawn and zosyn continued.  ID asked to help management.    Patient with LLL lung mass now s/p biopsy 4/7 with isolated post procedural fever  - patient completed empiric course of zosyn for possible underlying pneumonia  - if BC remains negative tomorrow, would d/c zosyn  - f/u all cultures 84F with hx TAVR (2019), COPD (on PRN O2), HLD, S1 Fracture (02/2025 no surgical intervention) initially admitted to [facility] 3/31-4/4 after a fall.  Noted to have fever, found to have new LLL mass like consolidation, c/f malignancy, infectious workup negative [(-) BC / UA / CXR].  Patient was transferred to [facility] for bronchoscopy with interventional pulmonology.  Underwent bronchoscopy and biopsy 4/7 complicated by isolated fever 102.  BC drawn and zosyn continued.  ID asked to help management.    Patient with LLL lung mass now s/p biopsy 4/7 with isolated post procedural fever with leukocytosis  - per chart, patient had leukocytosis prior to admission  - leukocytosis may be reactive to underlying malignancy (lung mass, splenomegaly)  - patient completed empiric course of zosyn for possible underlying pneumonia  - if BC remains negative tomorrow, would d/c zosyn  - f/u all cultures

## 2025-04-08 NOTE — PROGRESS NOTE ADULT - PROBLEM SELECTOR PLAN 6
pt was noted to be hypotensive yesterday, now resolved   - on  losartan 50 mg to be held for SBP<110   - monitor vital signs

## 2025-04-08 NOTE — PROGRESS NOTE ADULT - PROBLEM SELECTOR PLAN 3
Anemia profile noted from 4/2/25 , low serum iron and TIBC, consistent with AOCD with iron deficiency   Hb 8.9-->7.2 , maybe dilutional as pt received IVF  will repeat cbc    will transfuse for HB < 7

## 2025-04-08 NOTE — PROGRESS NOTE ADULT - PROBLEM SELECTOR PLAN 4
WBC 26 today  (was 65 on admission)  - infectious workup unrevealing - BCx/UA/CXR neg, QuantiFERON-TB Gold, fungal and viral serology pending  - ID evaluated at [facility] zosyn x 7 days, rec flow cytometry, AFB, bacterial and fungal cx to be sent with biopsy  -worsening leucocytosis yesterday , improved today to 16 , per daughter , her baseline is 14-15  - pt with  EBV IGM+, will discuss with ID if concern for acute infection  -ID Eval requested, will f/u recs  - trend WBC

## 2025-04-08 NOTE — PROGRESS NOTE ADULT - PROBLEM SELECTOR PLAN 1
LLL noted on imaging  - CT with 2.4cm lung nodule  - IR unable to obtain percutaneous biopsy  - appreciate IP ,s/p navigational bronchoscopy 4/7/25 with transbronchial biopsy of the LLL, EBUS with lymph node biopsies and BAL   post procedure CXR with  no pneumothorax or right pleural effusion.  pt  noted on intraoperative telemetry with abnormal ST segments, , will request  cardiology evaluation pending results  f/u BAL, LN Bx and TBBx results

## 2025-04-08 NOTE — CONSULT NOTE ADULT - SUBJECTIVE AND OBJECTIVE BOX
Cong Marcum MD  Interventional Cardiology / Endovascular Specialist  [place] Office : [address] [place] [address] [place] [address]  Tel:   [place] Office : [address] [facility] [place] [address]  Tel: [phone]    HISTORY OF PRESENTING ILLNESS:  84F with hx of TAVR (2019), COPD (on PRN O2), HLD, S1 Fracture (02/2025 no surgical intervention) initially admitted to [facility] 3/31-4/4 for fever, found to have new LL mass like consolidation, c/f malignancy, infectious workup negative, transferred to [facility] for bronchoscopy with interventional pulmonology. Patient denies fevers, chills, cp, sob, abdominal pain, n/v/d, dysuria, sick contacts, recent travel. S/p bronchoscopy 4/7. Cardiology consult called for intra-op ST changes on tele.        Infectious workup at [facility] - BCx, UA, CXR neg. QuantiFERON-TB Gold, fungal and viral serology pending. ID requests AFB, bacterial/fungal cultures, and flow cytometry of the biopsy specimen.   	  MEDICATIONS:  losartan 50 milliGRAM(s) Oral daily    piperacillin/tazobactam IVPB.. 3.375 Gram(s) IV Intermittent every 8 hours    albuterol/ipratropium for Nebulization 3 milliLiter(s) Nebulizer every 6 hours  fluticasone propionate/ salmeterol 250-50 MICROgram(s) Diskus 1 Dose(s) Inhalation two times a day  guaiFENesin Oral Liquid (Sugar-Free) 100 milliGRAM(s) Oral every 6 hours PRN    acetaminophen     Tablet .. 650 milliGRAM(s) Oral every 6 hours PRN  gabapentin 100 milliGRAM(s) Oral two times a day  melatonin 3 milliGRAM(s) Oral at bedtime PRN  promethazine 25 milliGRAM(s) Oral every 6 hours PRN  traMADol 50 milliGRAM(s) Oral every 8 hours PRN    pantoprazole    Tablet 40 milliGRAM(s) Oral before breakfast  polyethylene glycol 3350 17 Gram(s) Oral daily  senna 2 Tablet(s) Oral at bedtime    rosuvastatin 40 milliGRAM(s) Oral at bedtime    benzocaine/menthol Lozenge 1 Lozenge Oral two times a day  clotrimazole 1% Vaginal Cream 1 Applicatorful Vaginal at bedtime  lidocaine   4% Patch 1 Patch Transdermal daily      PAST MEDICAL/SURGICAL HISTORY  PAST MEDICAL & SURGICAL HISTORY:  HTN (hypertension)      COPD (chronic obstructive pulmonary disease)      HLD (hyperlipidemia)      S/P TAVR (transcatheter aortic valve replacement)          SOCIAL HISTORY: Substance Use (street drugs): ( x ) never used  (  ) other:    FAMILY HISTORY:      PHYSICAL EXAM:  T(C): 36.8 (04-08-25 @ 14:26), Max: 39 (04-07-25 @ 16:05)  HR: 81 (04-08-25 @ 14:26) (79 - 111)  BP: 101/54 (04-08-25 @ 14:26) (76/50 - 119/50)  RR: 17 (04-08-25 @ 14:26) (14 - 43)  SpO2: 100% (04-08-25 @ 14:26) (93% - 100%)  Wt(kg): --  I&O's Summary    07 Apr 2025 07:01  -  08 Apr 2025 07:00  --------------------------------------------------------  IN: 1525 mL / OUT: 520 mL / NET: 1005 mL    08 Apr 2025 07:01  -  08 Apr 2025 14:56  --------------------------------------------------------  IN: 400 mL / OUT: 300 mL / NET: 100 mL        Weight (kg): 95 (04-07 @ 17:00)    GENERAL: NAD, well-groomed, well-developed  EYES:  PERRLA  Cardiovascular: Normal S1 S2, No JVD, No murmurs, No edema  Respiratory: Lungs clear to auscultation	  Gastrointestinal:  Soft, Non-tender, + BS	  Extremities: Normal range of motion, No clubbing, cyanosis or edema  NERVOUS SYSTEM:  Alert & Oriented X3                              7.2    16.62 )-----------( 340      ( 08 Apr 2025 06:26 )             24.3     04-08    138  |  100  |  22  ----------------------------<  113[H]  4.2   |  25  |  1.08    Ca    7.7[L]      08 Apr 2025 06:26  Phos  5.5     04-07  Mg     2.00     04-07    TPro  5.8[L]  /  Alb  2.9[L]  /  TBili  0.3  /  DBili  x   /  AST  10  /  ALT  13  /  AlkPhos  60  04-08    proBNP:   Lipid Profile:   HgA1c:   TSH:     Consultant(s) Notes Reviewed:  [x ] YES  [ ] NO    Care Discussed with Consultants/Other Providers [ x] YES  [ ] NO    Imaging Personally Reviewed independently:  [x] YES  [ ] NO    All labs, radiologic studies, vitals, orders and medications list reviewed. Patient is seen and examined at bedside. Case discussed with medical team.

## 2025-04-08 NOTE — PROGRESS NOTE ADULT - PROBLEM SELECTOR PLAN 2
febrile on admission to [facility]  - infectious workup unrevealing - BCx/UA/CXR neg, QuantiFERON-TB Gold, fungal and viral serology pending  - CT chest with LLL lung nodule - management as below, CT CAP without infectious etiology   - ID (Dr. Bui) evaluated at [facility] - rec flow cytometry, AFB, bacterial and fungal cx to be sent with biopsy, , if any of these results come back positive will consult ID  - continue Zosyn (planned for empiric 7 day course per ID at [facility]) -   -pt had worsening leucocytosis yesterday , improved today to 16 , per daughter  her baseline is 14-15  - pt with  EBV IGM+,  -ID Eval requested, will f/u recs

## 2025-04-08 NOTE — PROGRESS NOTE ADULT - ASSESSMENT
84F with hx of TAVR (2019), COPD (on PRN O2), HLD, S1 Fracture (02/2025 no surgical intervention) initially admitted to [facility] 3/31-4/4 for fever, found to have new LL mass like consolidation for which IP was consulted    #LLL nodule  s/p navigational bronchoscopy 4/7/25 with transbronchial biopsy of the LLL, EBUS with lymph node biopsies and BAL  f/u BAL, LN Bx and TBBx results  pending infectious workup, remains on zosyn for today as per ID with possible de-escalation  f/u cardiology workup

## 2025-04-08 NOTE — PROGRESS NOTE ADULT - SUBJECTIVE AND OBJECTIVE BOX
CHIEF COMPLAINT:Patient is a 84y old  Female who presents with a chief complaint of transfer from [facility] (08 Apr 2025 10:27)      Interval Events:  s/p bronchoscopy yesterday  working with PT today, no new pulmonary complaints    REVIEW OF SYSTEMS:  [x] All other systems negative except per HPI   [ ] Unable to assess ROS because ________    OBJECTIVE:  ICU Vital Signs Last 24 Hrs  T(C): 36.8 (08 Apr 2025 14:26), Max: 36.8 (08 Apr 2025 14:26)  T(F): 98.3 (08 Apr 2025 14:26), Max: 98.3 (08 Apr 2025 14:26)  HR: 81 (08 Apr 2025 14:26) (79 - 94)  BP: 101/54 (08 Apr 2025 14:26) (76/50 - 119/50)  BP(mean): 64 (07 Apr 2025 21:00) (51 - 70)  ABP: --  ABP(mean): --  RR: 17 (08 Apr 2025 14:26) (14 - 31)  SpO2: 100% (08 Apr 2025 14:26) (94% - 100%)    O2 Parameters below as of 08 Apr 2025 14:31  Patient On (Oxygen Delivery Method): nasal cannula              04-07 @ 07:01  -  04-08 @ 07:00  --------------------------------------------------------  IN: 1525 mL / OUT: 520 mL / NET: 1005 mL    04-08 @ 07:01  -  04-08 @ 17:10  --------------------------------------------------------  IN: 400 mL / OUT: 300 mL / NET: 100 mL        PHYSICAL EXAM:  Gen: NAD, WD, WN  HEENT: MMM, PERRL, EOMI  Neck: No JVP elevation  CV: RRR, no m/r/g  Lung: CTAB  Abd: Soft, NT, ND  Ext: WWP, no CCE  Skin: No rash  Neuro: A&Ox3, no focal deficits    HOSPITAL MEDICATIONS:  MEDICATIONS  (STANDING):  albuterol/ipratropium for Nebulization 3 milliLiter(s) Nebulizer every 6 hours  benzocaine/menthol Lozenge 1 Lozenge Oral two times a day  clotrimazole 1% Vaginal Cream 1 Applicatorful Vaginal at bedtime  fluticasone propionate/ salmeterol 250-50 MICROgram(s) Diskus 1 Dose(s) Inhalation two times a day  gabapentin 100 milliGRAM(s) Oral two times a day  lidocaine   4% Patch 1 Patch Transdermal daily  losartan 50 milliGRAM(s) Oral daily  pantoprazole    Tablet 40 milliGRAM(s) Oral before breakfast  piperacillin/tazobactam IVPB.. 3.375 Gram(s) IV Intermittent every 8 hours  polyethylene glycol 3350 17 Gram(s) Oral daily  rosuvastatin 40 milliGRAM(s) Oral at bedtime  senna 2 Tablet(s) Oral at bedtime    MEDICATIONS  (PRN):  acetaminophen     Tablet .. 650 milliGRAM(s) Oral every 6 hours PRN Temp greater or equal to 38C (100.4F), Mild Pain (1 - 3), Moderate Pain (4 - 6)  guaiFENesin Oral Liquid (Sugar-Free) 100 milliGRAM(s) Oral every 6 hours PRN Cough  melatonin 3 milliGRAM(s) Oral at bedtime PRN Insomnia  promethazine 25 milliGRAM(s) Oral every 6 hours PRN allergies  traMADol 50 milliGRAM(s) Oral every 8 hours PRN Severe Pain (7 - 10)      LABS:    The Labs were reviewed by me   The Radiology was reviewed by me    EKG tracing reviewed by me    04-08    138  |  100  |  22  ----------------------------<  113[H]  4.2   |  25  |  1.08  04-07    138  |  101  |  18  ----------------------------<  164[H]  4.6   |  24  |  1.31[H]  04-07    137  |  99  |  12  ----------------------------<  125[H]  3.8   |  21[L]  |  0.89    Ca    7.7[L]      08 Apr 2025 06:26  Ca    8.2[L]      07 Apr 2025 18:56  Ca    8.3[L]      07 Apr 2025 06:05  Phos  5.5     04-07  Mg     2.00     04-07    TPro  5.8[L]  /  Alb  2.9[L]  /  TBili  0.3  /  DBili  x   /  AST  10  /  ALT  13  /  AlkPhos  60  04-08  TPro  6.0  /  Alb  2.8[L]  /  TBili  0.4  /  DBili  x   /  AST  12  /  ALT  13  /  AlkPhos  70  04-07    Magnesium: 2.00 mg/dL (04-07-25 @ 18:56)  Magnesium: 2.00 mg/dL (04-07-25 @ 06:05)  Magnesium: 2.20 mg/dL (04-06-25 @ 07:31)    Phosphorus: 5.5 mg/dL (04-07-25 @ 18:56)  Phosphorus: 3.7 mg/dL (04-07-25 @ 06:05)  Phosphorus: 4.1 mg/dL (04-06-25 @ 07:31)      PT/INR - ( 07 Apr 2025 06:05 )   PT: 15.8 sec;   INR: 1.37 ratio         PTT - ( 07 Apr 2025 06:05 )  PTT:39.7 sec              Urinalysis Basic - ( 08 Apr 2025 06:26 )    Color: x / Appearance: x / SG: x / pH: x  Gluc: 113 mg/dL / Ketone: x  / Bili: x / Urobili: x   Blood: x / Protein: x / Nitrite: x   Leuk Esterase: x / RBC: x / WBC x   Sq Epi: x / Non Sq Epi: x / Bacteria: x                              8.2    19.62 )-----------( 365      ( 08 Apr 2025 15:35 )             27.4                         7.2    16.62 )-----------( 340      ( 08 Apr 2025 06:26 )             24.3                         8.9    26.13 )-----------( 431      ( 07 Apr 2025 06:05 )             29.1     CAPILLARY BLOOD GLUCOSE            MICROBIOLOGY:     RADIOLOGY:  [ ] Reviewed and interpreted by me    Point of Care Ultrasound Findings:    PFT:    EKG:

## 2025-04-08 NOTE — CONSULT NOTE ADULT - SUBJECTIVE AND OBJECTIVE BOX
Patient is a 84y old  Female who presents with a chief complaint of Per chart, Pt is 84F with hx of TAVR (2019), COPD (on PRN O2), HLD, S1 Fracture (02/2025 no surgical intervention) initially admitted to [facility] 3/31-4/4 for fever, found to have new LLL mass like consolidation, c/f malignancy, infectious workup negative, transferred to [facility] for bronchoscopy with interventional pulmonology.   (05 Apr 2025 12:19)    HPI:  84F with hx of TAVR (2019), COPD (on PRN O2), HLD, S1 Fracture (02/2025 no surgical intervention) initially admitted to [facility] 3/31-4/4 for fever, found to have new LL mass like consolidation, c/f malignancy, infectious workup negative, transferred to [facility] for bronchoscopy with interventional pulmonology. Patient denies fevers, chills, cp, sob, abdominal pain, n/v/d, dysuria, sick contacts, recent travel.     Infectious workup at [facility] - BCx, UA, CXR neg. QuantiFERON-TB Gold, fungal and viral serology pending. ID requests AFB, bacterial/fungal cultures, and flow cytometry of the biopsy specimen.    (04 Apr 2025 15:56)       prior hospital charts reviewed [  ]  primary team notes reviewed [  ]  other consultant notes reviewed [  ]    PAST MEDICAL & SURGICAL HISTORY:  HTN (hypertension)      COPD (chronic obstructive pulmonary disease)      HLD (hyperlipidemia)      S/P TAVR (transcatheter aortic valve replacement)          Allergies  tetracycline (Rash; Urticaria)    ANTIMICROBIALS (past 90 days)  MEDICATIONS  (STANDING):  fluconAZOLE   Tablet   150 milliGRAM(s) Oral (04-08-25 @ 09:24)    piperacillin/tazobactam IVPB..   25 mL/Hr IV Intermittent (04-08-25 @ 06:42)   25 mL/Hr IV Intermittent (04-07-25 @ 22:50)   25 mL/Hr IV Intermittent (04-07-25 @ 05:39)   25 mL/Hr IV Intermittent (04-06-25 @ 21:37)   25 mL/Hr IV Intermittent (04-06-25 @ 14:33)   25 mL/Hr IV Intermittent (04-06-25 @ 05:05)   25 mL/Hr IV Intermittent (04-05-25 @ 22:17)   25 mL/Hr IV Intermittent (04-05-25 @ 13:39)   25 mL/Hr IV Intermittent (04-05-25 @ 06:21)   25 mL/Hr IV Intermittent (04-04-25 @ 21:57)    vancomycin  IVPB   250 mL/Hr IV Intermittent (04-07-25 @ 20:46)        piperacillin/tazobactam IVPB.. 3.375 every 8 hours    MEDICATIONS  (STANDING):  acetaminophen     Tablet .. 650 every 6 hours PRN  albuterol/ipratropium for Nebulization 3 every 6 hours  fluticasone propionate/ salmeterol 250-50 MICROgram(s) Diskus 1 two times a day  gabapentin 100 two times a day  guaiFENesin Oral Liquid (Sugar-Free) 100 every 6 hours PRN  losartan 50 daily  melatonin 3 at bedtime PRN  pantoprazole    Tablet 40 before breakfast  polyethylene glycol 3350 17 daily  promethazine 25 every 6 hours PRN  rosuvastatin 40 at bedtime  senna 2 at bedtime  traMADol 50 every 8 hours PRN    SOCIAL HISTORY:       FAMILY HISTORY:    REVIEW OF SYSTEMS  [  ] ROS unobtainable because:    [  ] All other systems negative except as noted below:	    Constitutional:  [ ] fever [ ] chills  [ ] weight loss  [ ] weakness  Skin:  [ ] rash [ ] phlebitis	  Eyes: [ ] icterus [ ] pain  [ ] discharge	  ENMT: [ ] sore throat  [ ] thrush [ ] ulcers [ ] exudates  Respiratory: [ ] dyspnea [ ] hemoptysis [ ] cough [ ] sputum	  Cardiovascular:  [ ] chest pain [ ] palpitations [ ] edema	  Gastrointestinal:  [ ] nausea [ ] vomiting [ ] diarrhea [ ] constipation [ ] pain	  Genitourinary:  [ ] dysuria [ ] frequency [ ] hematuria [ ] discharge [ ] flank pain  [ ] incontinence  Musculoskeletal:  [ ] myalgias [ ] arthralgias [ ] arthritis  [ ] back pain  Neurological:  [ ] headache [ ] seizures  [ ] confusion/altered mental status  Psychiatric:  [ ] anxiety [ ] depression	  Hematology/Lymphatics:  [ ] lymphadenopathy  Endocrine:  [ ] adrenal [ ] thyroid  Allergic/Immunologic:	 [ ] transplant [ ] seasonal    Vital Signs Last 24 Hrs  T(F): 97.3 (04-08-25 @ 10:05), Max: 102.2 (04-07-25 @ 16:05)  Vital Signs Last 24 Hrs  HR: 80 (04-08-25 @ 10:05) (79 - 111)  BP: 115/55 (04-08-25 @ 06:00) (76/50 - 119/50)  RR: 16 (04-08-25 @ 10:05)  SpO2: 98% (04-08-25 @ 10:05) (93% - 100%)  Wt(kg): --    PHYSICAL EXAM:  Constitutional: non-toxic, no distress  HEAD/EYES: anicteric, no conjunctival injection  ENT:  supple, no thrush  Cardiovascular:   normal S1, S2, no murmur, no edema  Respiratory:  clear BS bilaterally, no wheezes, no rales  GI:  soft, non-tender, normal bowel sounds  :  no otoole, no CVA tenderness  Musculoskeletal:  no synovitis, normal ROM  Neurologic: awake and alert, normal strength, no focal findings  Skin:  no rash, no erythema, no phlebitis  Heme/Onc: no lymphadenopathy   Psychiatric:  awake, alert, appropriate mood                            7.2    16.62 )-----------( 340      ( 08 Apr 2025 06:26 )             24.3   04-08    138  |  100  |  22  ----------------------------<  113[H]  4.2   |  25  |  1.08    Ca    7.7[L]      08 Apr 2025 06:26  Phos  5.5     04-07  Mg     2.00     04-07    TPro  5.8[L]  /  Alb  2.9[L]  /  TBili  0.3  /  DBili  x   /  AST  10  /  ALT  13  /  AlkPhos  60  04-08    Urinalysis Basic - ( 08 Apr 2025 06:26 )    Urinalysis (04.07.25 @ 06:12)   pH Urine: 5.5  Glucose Qualitative, Urine: Negative mg/dL  Blood, Urine: Trace  Color: Dark Yellow  Urine Appearance: Turbid  Bilirubin: Small  Ketone - Urine: Negative mg/dL  Specific Gravity: 1.030  Protein, Urine: 30 mg/dL  Urobilinogen: 1.0 mg/dL  Nitrite: Negative  Leukocyte Esterase Concentration: Moderate      MICROBIOLOGY:  Culture - Tissue with Gram Stain (collected 07 Apr 2025 17:00)  Source: Tissue LEFT LOWER LOBE FORCEP BIOPSYe  Gram Stain (08 Apr 2025 01:37):    No polymorphonuclear cells seen per low power field    No organisms seen per oil power field    Culture - Fungal, Tissue (collected 07 Apr 2025 17:00)  Source: Tissue LEFT LOWER LOBE FORCEP BIO  Preliminary Report (08 Apr 2025 08:02):    Testing in progress    Culture - Fungal, Bronchial (collected 07 Apr 2025 15:39)  Source: Bronchial BAL LLL  Preliminary Report (08 Apr 2025 07:48):    Testing in progress    Culture - Bronchial (collected 07 Apr 2025 15:39)  Source: Bronchial BAL LLL  Gram Stain (07 Apr 2025 22:51):    Moderate polymorphonuclear leukocytes per low power field    No squamous epithelial cells per low power field    No organisms seen per oil power field    Culture - Blood (collected 07 Apr 2025 07:05)  Source: Blood Blood-Peripheral  Preliminary Report (08 Apr 2025 09:02):    No growth at 24 hours    Culture - Blood (collected 07 Apr 2025 06:10)  Source: Blood Blood-Peripheral  Preliminary Report (08 Apr 2025 09:02):    No growth at 24 hours            CMV IgG Antibody: <0.20 U/mL (04-03-25 @ 06:45)        RADIOLOGY:  < from: CT Chest w/ IV Cont (04.01.25 @ 18:15) >  CT of the Chest, Abdomen and Pelvis was performed.  Sagittal and coronal reformats were performed.    FINDINGS:    CHEST:  LUNGS AND LARGE AIRWAYS: Patent central airways.  2.4 x 2.4 cm irregular noncalcified masslike density medial aspect of the   left lower lobe abutting the pleura (axial image 3-66 which is new since   October 2019. 3 mm noncalcified subpleural nodule right middle lobe axial   image 3-92 is unchanged. Calcified granuloma within the right upper and   left upper lobes.  Mild bibasilar subsegmental atelectasis. No focal alveolar consolidation.  PLEURA: Trace trace bilateral pleural effusion.  VESSELS: Atherosclerotic vascular calcification thoracic aorta, mitral   valve and coronary arteries. No thoracic aneurysm or dissection. No acute   aortic syndrome. Aberrant right subclavian artery. Exam was not performed   or optimized for evaluation of pulmonary embolism.  HEART: Heart size is normal. No pericardial effusion. Status post TAVR.  MEDIASTINUM AND ROBINSON: No lymphadenopathy.  CHEST WALL AND LOWER NECK:Within normal limits.    ABDOMEN AND PELVIS:  LIVER: Within normal limits.  BILE DUCTS: Normal caliber.  GALLBLADDER: Within normal limits.  SPLEEN: Spleen is enlarged measuring 15.5 cm. and increased in size from   prior exam October 2, 2019. No focal defects  PANCREAS: Within normal limits.  ADRENALS: 2 cm low-density left adrenal nodule previously measured 1.2 cm   October 2019 with attenuation values at that time consistent with adenoma.  KIDNEYS/URETERS: No hydronephrosis. No renal or ureteral stones. Simple   left renal cyst measuring 6.1 x 5.4 cm. No solid renal mass lesions.    Diverticulosis without acute diverticulitis.    BLADDER: Within normal limits.  REPRODUCTIVE ORGANS: Uterus and adnexa within normal limits.    BOWEL: No bowelobstruction. Appendix is normal.  PERITONEUM/RETROPERITONEUM: Within normal limits.  No lytic or blastic process.  VESSELS: Atherosclerotic changes.  LYMPH NODES: Scattered subcentimeter periaortic lymph nodes. Small   bilateral external iliac lymph nodes measuring up to 1.1 x 0.9 cm on left   axial image 3-250.  ABDOMINAL WALL: Within normal limits.  BONES: Degenerative changes.    IMPRESSION:  2.4 x 2.4 cm irregular noncalcified masslike density medial aspect of the   left lower lobe abutting the pleura which is new since October 2019.   Presumed neoplastic etiology until proven otherwise. PET/CT correlation   recommended for further evaluation.    2 cm left adrenal nodule previously measured 1.2 cm October 2019 with   attenuation values atthat time consistent with adenoma.    Spleen is enlarged measuring 15.5 cm. and increased in size from prior   exam October 2019. No focal defects    Scattered subcentimeter periaortic lymph nodes. Small bilateral external   iliac lymph nodes measuring up to 1.1 x 0.9 cm.    Please refer to detailed findings otherwise described above.    < end of copied text >  < from: Xray Wrist 3 Views, Left (04.03.25 @ 13:55) >  IMPRESSION:  No fractures or dislocations.    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 Preserved remaining joint spaces andno   joint margin erosions.    Carpal bones normally aligned.    Neutral ulnar variance.    Generalized osteopenia otherwise no discrete lytic or blastic lesions.    < end of copied text >  imaging below personally reviewed and agree with findings Patient is a 84y old  Female who presents with a chief complaint of Per chart, Pt is 84F with hx of TAVR (2019), COPD (on PRN O2), HLD, S1 Fracture (02/2025 no surgical intervention) initially admitted to [facility] 3/31-4/4 for fever, found to have new LLL mass like consolidation, c/f malignancy, infectious workup negative, transferred to [facility] for bronchoscopy with interventional pulmonology.   (05 Apr 2025 12:19)    HPI:  84F with hx of TAVR (2019), COPD (on PRN O2), HLD, S1 Fracture (02/2025 no surgical intervention) initially admitted to [facility] 3/31-4/4 for fever, found to have new LL mass like consolidation, c/f malignancy, infectious workup negative, transferred to [facility] for bronchoscopy with interventional pulmonology. Patient denies fevers, chills, cp, sob, abdominal pain, n/v/d, dysuria, sick contacts, recent travel.     Infectious workup at [facility] - BCx, UA, CXR neg. QuantiFERON-TB Gold, fungal and viral serology pending. ID requests AFB, bacterial/fungal cultures, and flow cytometry of the biopsy specimen.    (04 Apr 2025 15:56)     prior hospital charts reviewed [x  ]  primary team notes reviewed [ x ]  other consultant notes reviewed [  ]    PAST MEDICAL & SURGICAL HISTORY:  HTN (hypertension)  COPD (chronic obstructive pulmonary disease)  HLD (hyperlipidemia)  S/P TAVR (transcatheter aortic valve replacement)    Allergies  tetracycline (Rash; Urticaria)    ANTIMICROBIALS:  vancomycin  IVPB (4/7 x1)  fluconAZOLE   Tablet (4/8 x1)    active:  piperacillin/tazobactam IVPB.. 3.375 every 8 hours (4/4-)    MEDICATIONS  (STANDING):  albuterol/ipratropium for Nebulization 3 every 6 hours  fluticasone propionate/ salmeterol 250-50 MICROgram(s) Diskus 1 two times a day  gabapentin 100 two times a day  losartan 50 daily  pantoprazole    Tablet 40 before breakfast  polyethylene glycol 3350 17 daily  rosuvastatin 40 at bedtime  senna 2 at bedtime    SOCIAL HISTORY:   prior smoker    FAMILY HISTORY:    REVIEW OF SYSTEMS  [  ] ROS unobtainable because:    [  ] All other systems negative except as noted below:	    Constitutional:  [ ] fever [ ] chills  [ ] weight loss  [ ] weakness  Skin:  [ ] rash [ ] phlebitis	  Eyes: [ ] icterus [ ] pain  [ ] discharge	  ENMT: [ ] sore throat  [ ] thrush [ ] ulcers [ ] exudates  Respiratory: [ ] dyspnea [ ] hemoptysis [ ] cough [ ] sputum	  Cardiovascular:  [ ] chest pain [ ] palpitations [ ] edema	  Gastrointestinal:  [ ] nausea [ ] vomiting [ ] diarrhea [ ] constipation [ ] pain	  Genitourinary:  [ ] dysuria [ ] frequency [ ] hematuria [ ] discharge [ ] flank pain  [ ] incontinence  Musculoskeletal:  [ ] myalgias [ ] arthralgias [ ] arthritis  [ ] back pain  Neurological:  [ ] headache [ ] seizures  [ ] confusion/altered mental status  Psychiatric:  [ ] anxiety [ ] depression	  Hematology/Lymphatics:  [ ] lymphadenopathy  Endocrine:  [ ] adrenal [ ] thyroid  Allergic/Immunologic:	 [ ] transplant [ ] seasonal    Vital Signs Last 24 Hrs  T(F): 97.3 (04-08-25 @ 10:05), Max: 102.2 (04-07-25 @ 16:05)  Vital Signs Last 24 Hrs  HR: 80 (04-08-25 @ 10:05) (79 - 111)  BP: 115/55 (04-08-25 @ 06:00) (76/50 - 119/50)  RR: 16 (04-08-25 @ 10:05)  SpO2: 98% (04-08-25 @ 10:05) (93% - 100%)  Wt(kg): --    PHYSICAL EXAM:                              7.2    16.62 )-----------( 340      ( 08 Apr 2025 06:26 )             24.3     138  |  100  |  22  ----------------------------<  113[H]  4.2   |  25  |  1.08    Ca    7.7[L]      08 Apr 2025 06:26  Phos  5.5     04-07  Mg     2.00     04-07    TPro  5.8[L]  /  Alb  2.9[L]  /  TBili  0.3  /  DBili  x   /  AST  10  /  ALT  13  /  AlkPhos  60  04-08    Urinalysis Basic - ( 08 Apr 2025 06:26 )  Urinalysis (04.07.25 @ 06:12)   pH Urine: 5.5  Glucose Qualitative, Urine: Negative mg/dL  Blood, Urine: Trace  Color: Dark Yellow  Urine Appearance: Turbid  Bilirubin: Small  Ketone - Urine: Negative mg/dL  Specific Gravity: 1.030  Protein, Urine: 30 mg/dL  Urobilinogen: 1.0 mg/dL  Nitrite: Negative  Leukocyte Esterase Concentration: Moderate    MICROBIOLOGY:  Culture - Tissue with Gram Stain (collected 07 Apr 2025 17:00)  Source: Tissue LEFT LOWER LOBE FORCEP BIOPSYe  Gram Stain (08 Apr 2025 01:37):    No polymorphonuclear cells seen per low power field    No organisms seen per oil power field    Culture - Fungal, Tissue (collected 07 Apr 2025 17:00)  Source: Tissue LEFT LOWER LOBE FORCEP BIO  Preliminary Report (08 Apr 2025 08:02):    Testing in progress    Culture - Fungal, Bronchial (collected 07 Apr 2025 15:39)  Source: Bronchial BAL LLL  Preliminary Report (08 Apr 2025 07:48):    Testing in progress    Culture - Bronchial (collected 07 Apr 2025 15:39)  Source: Bronchial BAL LLL  Gram Stain (07 Apr 2025 22:51):    Moderate polymorphonuclear leukocytes per low power field    No squamous epithelial cells per low power field    No organisms seen per oil power field    Culture - Blood (collected 07 Apr 2025 07:05)  Source: Blood Blood-Peripheral  Preliminary Report (08 Apr 2025 09:02):    No growth at 24 hours    Culture - Blood (collected 07 Apr 2025 06:10)  Source: Blood Blood-Peripheral  Preliminary Report (08 Apr 2025 09:02):    No growth at 24 hours    CMV IgG Antibody: <0.20 U/mL (04-03-25 @ 06:45)    RADIOLOGY:  below radiology personally reviewed and agree with finding    CT Chest w/ IV Cont (04.01.25 @ 18:15) >  IMPRESSION:  2.4 x 2.4 cm irregular noncalcified masslike density medial aspect of the left lower lobe abutting the pleura which is new since October 2019. Presumed neoplastic etiology until proven otherwise. PET/CT correlation recommended for further evaluation.  2 cm left adrenal nodule previously measured 1.2 cm October 2019 with attenuation values at that time consistent with adenoma.  Spleen is enlarged measuring 15.5 cm. and increased in size from prior exam October 2019. No focal defects.  Scattered subcentimeter periaortic lymph nodes. Small bilateral external iliac lymph nodes measuring up to 1.1 x 0.9 cm.      Xray Wrist 3 Views, Left (04.03.25 @ 13:55) >  IMPRESSION:  No fractures or dislocations.  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 Preserved remaining joint spaces and no joint margin erosions.  Carpal bones normally aligned.  Neutral ulnar variance.  Generalized osteopenia otherwise no discrete lytic or blastic lesions.    Patient is a 84y old  Female who presents with a chief complaint of Per chart, Pt is 84F with hx of TAVR (2019), COPD (on PRN O2), HLD, S1 Fracture (02/2025 no surgical intervention) initially admitted to [facility] 3/31-4/4 for fever, found to have new LLL mass like consolidation, c/f malignancy, infectious workup negative, transferred to [facility] for bronchoscopy with interventional pulmonology.   (05 Apr 2025 12:19)    HPI:  84F with hx of TAVR (2019), COPD (on PRN O2), HLD, S1 Fracture (02/2025 no surgical intervention) initially admitted to [facility] 3/31-4/4 for fever, found to have new LL mass like consolidation, c/f malignancy, infectious workup negative, transferred to [facility] for bronchoscopy with interventional pulmonology. Patient denies fevers, chills, cp, sob, abdominal pain, n/v/d, dysuria, sick contacts, recent travel.     Infectious workup at [facility] - BCx, UA, CXR neg. QuantiFERON-TB Gold, fungal and viral serology pending. ID requests AFB, bacterial/fungal cultures, and flow cytometry of the biopsy specimen.    (04 Apr 2025 15:56)     prior hospital charts reviewed [x  ]  primary team notes reviewed [ x ]  other consultant notes reviewed [  ]    PAST MEDICAL & SURGICAL HISTORY:  HTN (hypertension)  COPD (chronic obstructive pulmonary disease)  HLD (hyperlipidemia)  S/P TAVR (transcatheter aortic valve replacement)    Allergies  tetracycline (Rash; Urticaria)    ANTIMICROBIALS:  vancomycin  IVPB (4/7 x1)  fluconAZOLE   Tablet (4/8 x1)    active:  piperacillin/tazobactam IVPB.. 3.375 every 8 hours (4/4-)    MEDICATIONS  (STANDING):  albuterol/ipratropium for Nebulization 3 every 6 hours  fluticasone propionate/ salmeterol 250-50 MICROgram(s) Diskus 1 two times a day  gabapentin 100 two times a day  losartan 50 daily  pantoprazole    Tablet 40 before breakfast  polyethylene glycol 3350 17 daily  rosuvastatin 40 at bedtime  senna 2 at bedtime    SOCIAL HISTORY:   prior smoker    FAMILY HISTORY:    REVIEW OF SYSTEMS  [  ] ROS unobtainable because:    [  ] All other systems negative except as noted below:	    Constitutional:  [ ] fever [ ] chills  [ ] weight loss  [ ] weakness  Skin:  [ ] rash [ ] phlebitis	  Eyes: [ ] icterus [ ] pain  [ ] discharge	  ENMT: [ ] sore throat  [ ] thrush [ ] ulcers [ ] exudates  Respiratory: [ ] dyspnea [ ] hemoptysis [ ] cough [ ] sputum	  Cardiovascular:  [ ] chest pain [ ] palpitations [ ] edema	  Gastrointestinal:  [ ] nausea [ ] vomiting [ ] diarrhea [ ] constipation [ ] pain	  Genitourinary:  [ ] dysuria [ ] frequency [ ] hematuria [ ] discharge [ ] flank pain  [ ] incontinence  Musculoskeletal:  [ ] myalgias [ ] arthralgias [ ] arthritis  [ ] back pain  Neurological:  [ ] headache [ ] seizures  [ ] confusion/altered mental status  Psychiatric:  [ ] anxiety [ ] depression	  Hematology/Lymphatics:  [ ] lymphadenopathy  Endocrine:  [ ] adrenal [ ] thyroid  Allergic/Immunologic:	 [ ] transplant [ ] seasonal    Vital Signs Last 24 Hrs  T(F): 97.3 (04-08-25 @ 10:05), Max: 102.2 (04-07-25 @ 16:05)  Vital Signs Last 24 Hrs  HR: 80 (04-08-25 @ 10:05) (79 - 111)  BP: 115/55 (04-08-25 @ 06:00) (76/50 - 119/50)  RR: 16 (04-08-25 @ 10:05)  SpO2: 98% (04-08-25 @ 10:05) (93% - 100%)  Wt(kg): --    PHYSICAL EXAM:                              7.2    16.62 )-----------( 340      ( 08 Apr 2025 06:26 )             24.3     138  |  100  |  22  ----------------------------<  113[H]  4.2   |  25  |  1.08    Ca    7.7[L]      08 Apr 2025 06:26  Phos  5.5     04-07  Mg     2.00     04-07    TPro  5.8[L]  /  Alb  2.9[L]  /  TBili  0.3  /  DBili  x   /  AST  10  /  ALT  13  /  AlkPhos  60  04-08    Urinalysis Basic - ( 08 Apr 2025 06:26 )  Urinalysis (04.07.25 @ 06:12)   pH Urine: 5.5  Glucose Qualitative, Urine: Negative mg/dL  Blood, Urine: Trace  Color: Dark Yellow  Urine Appearance: Turbid  Bilirubin: Small  Ketone - Urine: Negative mg/dL  Specific Gravity: 1.030  Protein, Urine: 30 mg/dL  Urobilinogen: 1.0 mg/dL  Nitrite: Negative  Leukocyte Esterase Concentration: Moderate    MICROBIOLOGY:  Culture - Tissue with Gram Stain (collected 07 Apr 2025 17:00)  Source: Tissue LEFT LOWER LOBE FORCEP BIOPSYe  Gram Stain (08 Apr 2025 01:37):    No polymorphonuclear cells seen per low power field    No organisms seen per oil power field    Culture - Fungal, Tissue (collected 07 Apr 2025 17:00)  Source: Tissue LEFT LOWER LOBE FORCEP BIO  Preliminary Report (08 Apr 2025 08:02):    Testing in progress    Culture - Fungal, Bronchial (collected 07 Apr 2025 15:39)  Source: Bronchial BAL LLL  Preliminary Report (08 Apr 2025 07:48):    Testing in progress    Culture - Bronchial (collected 07 Apr 2025 15:39)  Source: Bronchial BAL LLL  Gram Stain (07 Apr 2025 22:51):    Moderate polymorphonuclear leukocytes per low power field    No squamous epithelial cells per low power field    No organisms seen per oil power field    Culture - Blood (collected 07 Apr 2025 07:05)  Source: Blood Blood-Peripheral  Preliminary Report (08 Apr 2025 09:02):    No growth at 24 hours    Culture - Blood (collected 07 Apr 2025 06:10)  Source: Blood Blood-Peripheral  Preliminary Report (08 Apr 2025 09:02):    No growth at 24 hours    CMV IgG Antibody: <0.20 U/mL (04-03-25 @ 06:45)  Quantiferon is indeterminant    RADIOLOGY:  below radiology personally reviewed and agree with finding      Xray Chest 1 View- PORTABLE-Urgent (Xray Chest 1 View- PORTABLE-Urgent .) (04.07.25 @ 16:37) >  IMPRESSION:  New left basilar hazy opacity, which may represent pleural effusion and/or atelectasis.  Redemonstrated left retrocardiac nodular opacity, better appreciated CT chest 4/1/2025.     Xray Wrist 3 Views, Left (04.03.25 @ 13:55) >  IMPRESSION:  No fractures or dislocations.  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 Preserved remaining joint spaces andno joint margin erosions.  Carpal bones normally aligned.  Neutral ulnar variance.  Generalized osteopenia otherwise no discrete lytic or blastic lesions.    CT Chest w/ IV Cont (04.01.25 @ 18:15) >  IMPRESSION:  2.4 x 2.4 cm irregular noncalcified masslike density medial aspect of the left lower lobe abutting the pleura which is new since October 2019. Presumed neoplastic etiology until proven otherwise. PET/CT correlation recommended for further evaluation.  2 cm left adrenal nodule previously measured 1.2 cm October 2019 with attenuation values at that time consistent with adenoma.  Spleen is enlarged measuring 15.5 cm. and increased in size from prior exam October 2019. No focal defects.  Scattered subcentimeter periaortic lymph nodes. Small bilateral external iliac lymph nodes measuring up to 1.1 x 0.9 cm.      Xray Chest 1 View- PORTABLE-Urgent (03.31.25 @ 19:26) >  IMPRESSION:  Left retrocardiac nodular opacity, better appreciated in CT of the chest. Follow-up dedicated CT chest report.    CT Chest No Cont (03.31.25 @ 19:20) >  IMPRESSION:  Left lower lobe 2.4 cm nodule new since October 2, 2019 concerning for neoplasm, possibly primary lung neoplasm.  Enlargement of the partially imaged spleen with interval increase in size since 2019.    CT Lumbar Spine No Cont (03.31.25 @ 19:20) >  IMPRESSION:  Age-indeterminate S1 superior endplate fracture. No prior study available for comparison at this time. No traumatic malalignment.  Multilevel degenerative changes, as above.  Sigmoid colon diverticulosis.     Patient is a 84y old  Female who presents with a chief complaint of Per chart, Pt is 84F with hx of TAVR (2019), COPD (on PRN O2), HLD, S1 Fracture (02/2025 no surgical intervention) initially admitted to [facility] 3/31-4/4 for fever, found to have new LLL mass like consolidation, c/f malignancy, infectious workup negative, transferred to [facility] for bronchoscopy with interventional pulmonology.    HPI:  84F with hx of TAVR (2019), COPD (on 3L PRN O2), HLD, S1 Fracture (02/2025 no surgical intervention) initially admitted to [facility] 3/31-4/4 for fever, found to have new LL mass like consolidation, c/f malignancy, infectious workup negative, transferred to [facility] for bronchoscopy with interventional pulmonology. Patient denies fevers, chills, cp, sob, abdominal pain, n/v/d, dysuria, sick contacts, recent travel. Patient was born in [place] and has not travelled out of the country. 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 Infectious workup at including blood culture, UA, urine culture, quantiferon have been negative. Patient was being treated with Zosyn due to concern for superimposed LLL pneumonia. She is s/p bronchoscopy with biopsy 4/7. She was noted to be febrile post-op. ID asked to help manage.          prior hospital charts reviewed [x  ]  primary team notes reviewed [ x ]  other consultant notes reviewed [ x ]    PAST MEDICAL & SURGICAL HISTORY:  HTN (hypertension)  COPD (chronic obstructive pulmonary disease)  HLD (hyperlipidemia)  S/P TAVR (transcatheter aortic valve replacement)    Allergies  tetracycline (Rash; Urticaria)    ANTIMICROBIALS:  vancomycin  IVPB (4/7 x1)  fluconAZOLE   Tablet (4/8 x1)    active:  piperacillin/tazobactam IVPB.. 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 [employer]     FAMILY HISTORY:  Mother with CAD  No known family history of malignancy     REVIEW OF SYSTEMS  [  ] ROS unobtainable because:    [  ] All other systems negative except as noted below:	    Constitutional:  [ ] fever [ ] chills  [ ] weight loss  [ ] weakness  Skin:  [ ] rash [ ] phlebitis	  Eyes: [ ] icterus [ ] pain  [ ] discharge	  ENMT: [ ] sore throat  [ ] thrush [ ] ulcers [ ] exudates  Respiratory: [ ] dyspnea [ ] hemoptysis [x ] chronic cough [ ] sputum	  Cardiovascular:  [ ] chest pain [ ] palpitations [ ] edema	  Gastrointestinal:  [ ] nausea [ ] vomiting [ ] diarrhea [ ] constipation [ ] pain	  Genitourinary:  [ ] dysuria [ ] frequency [ ] hematuria [ ] discharge [ ] flank pain  [ ] incontinence  Musculoskeletal:  [ ] myalgias [ ] arthralgias [ ] arthritis  [ ] back pain  Neurological:  [ ] headache [ ] seizures  [ ] confusion/altered mental status  Psychiatric:  [ ] anxiety [ ] depression	  Hematology/Lymphatics:  [ ] lymphadenopathy  Endocrine:  [ ] adrenal [ ] thyroid  Allergic/Immunologic:	 [ ] transplant [ ] seasonal    Vital Signs Last 24 Hrs  T(F): 97.3 (04-08-25 @ 10:05), Max: 102.2 (04-07-25 @ 16:05)  Vital Signs Last 24 Hrs  HR: 80 (04-08-25 @ 10:05) (79 - 111)  BP: 115/55 (04-08-25 @ 06:00) (76/50 - 119/50)  RR: 16 (04-08-25 @ 10:05)  SpO2: 98% (04-08-25 @ 10:05) (93% - 100%)  Wt(kg): --    PHYSICAL EXAM:  General: in no acute distress  HEENT:  mucous membranes moist   CV: +S1/S2, RRR, no murmurs heard  Lungs: decreased breath sounds bilaterally, on 3L NC  Abd:  BS4+, Soft, NTND  : No suprapubic tenderness  Neuro: AAOx3  Ext: No cyanosis, no edema  Msk: freely moving upper and lower extremities  Skin: No rash, no phlebitis                              7.2    16.62 )-----------( 340      ( 08 Apr 2025 06:26 )             24.3     138  |  100  |  22  ----------------------------<  113[H]  4.2   |  25  |  1.08    Ca    7.7[L]      08 Apr 2025 06:26  Phos  5.5     04-07  Mg     2.00     04-07    TPro  5.8[L]  /  Alb  2.9[L]  /  TBili  0.3  /  DBili  x   /  AST  10  /  ALT  13  /  AlkPhos  60  04-08    Urinalysis Basic - ( 08 Apr 2025 06:26 )  Urinalysis (04.07.25 @ 06:12)   pH Urine: 5.5  Glucose Qualitative, Urine: Negative mg/dL  Blood, Urine: Trace  Color: Dark Yellow  Urine Appearance: Turbid  Bilirubin: Small  Ketone - Urine: Negative mg/dL  Specific Gravity: 1.030  Protein, Urine: 30 mg/dL  Urobilinogen: 1.0 mg/dL  Nitrite: Negative  Leukocyte Esterase Concentration: Moderate    MICROBIOLOGY:  Culture - Tissue with Gram Stain (collected 07 Apr 2025 17:00)  Source: Tissue LEFT LOWER LOBE FORCEP BIOPSYe  Gram Stain (08 Apr 2025 01:37):    No polymorphonuclear cells seen per low power field    No organisms seen per oil power field    Culture - Fungal, Tissue (collected 07 Apr 2025 17:00)  Source: Tissue LEFT LOWER LOBE FORCEP BIO  Preliminary Report (08 Apr 2025 08:02):    Testing in progress    Culture - Fungal, Bronchial (collected 07 Apr 2025 15:39)  Source: Bronchial BAL LLL  Preliminary Report (08 Apr 2025 07:48):    Testing in progress    Culture - Bronchial (collected 07 Apr 2025 15:39)  Source: Bronchial BAL LLL  Gram Stain (07 Apr 2025 22:51):    Moderate polymorphonuclear leukocytes per low power field    No squamous epithelial cells per low power field    No organisms seen per oil power field    Culture - Blood (collected 07 Apr 2025 07:05)  Source: Blood Blood-Peripheral  Preliminary Report (08 Apr 2025 09:02):    No growth at 24 hours    Culture - Blood (collected 07 Apr 2025 06:10)  Source: Blood Blood-Peripheral  Preliminary Report (08 Apr 2025 09:02):    No growth at 24 hours    CMV IgG Antibody: <0.20 U/mL (04-03-25 @ 06:45)  Quantiferon is indeterminant    RADIOLOGY:  below radiology personally reviewed and agree with finding      Xray Chest 1 View- PORTABLE-Urgent (Xray Chest 1 View- PORTABLE-Urgent .) (04.07.25 @ 16:37) >  IMPRESSION:  New left basilar hazy opacity, which may represent pleural effusion and/or atelectasis.  Redemonstrated left retrocardiac nodular opacity, better appreciated CT chest 4/1/2025.     Xray Wrist 3 Views, Left (04.03.25 @ 13:55) >  IMPRESSION:  No fractures or dislocations.  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 Preserved remaining joint spaces andno joint margin erosions.  Carpal bones normally aligned.  Neutral ulnar variance.  Generalized osteopenia otherwise no discrete lytic or blastic lesions.    CT Chest w/ IV Cont (04.01.25 @ 18:15) >  IMPRESSION:  2.4 x 2.4 cm irregular noncalcified masslike density medial aspect of the left lower lobe abutting the pleura which is new since October 2019. Presumed neoplastic etiology until proven otherwise. PET/CT correlation recommended for further evaluation.  2 cm left adrenal nodule previously measured 1.2 cm October 2019 with attenuation values at that time consistent with adenoma.  Spleen is enlarged measuring 15.5 cm. and increased in size from prior exam October 2019. No focal defects.  Scattered subcentimeter periaortic lymph nodes. Small bilateral external iliac lymph nodes measuring up to 1.1 x 0.9 cm.      Xray Chest 1 View- PORTABLE-Urgent (03.31.25 @ 19:26) >  IMPRESSION:  Left retrocardiac nodular opacity, better appreciated in CT of the chest. Follow-up dedicated CT chest report.    CT Chest No Cont (03.31.25 @ 19:20) >  IMPRESSION:  Left lower lobe 2.4 cm nodule new since October 2, 2019 concerning for neoplasm, possibly primary lung neoplasm.  Enlargement of the partially imaged spleen with interval increase in size since 2019.    CT Lumbar Spine No Cont (03.31.25 @ 19:20) >  IMPRESSION:  Age-indeterminate S1 superior endplate fracture. No prior study available for comparison at this time. No traumatic malalignment.  Multilevel degenerative changes, as above.  Sigmoid colon diverticulosis.     Patient is a 84y old  Female who presents with a chief complaint of Per chart, Pt is 84F with hx of TAVR (2019), COPD (on PRN O2), HLD, S1 Fracture (02/2025 no surgical intervention) initially admitted to [facility] 3/31-4/4 for fever, found to have new LLL mass like consolidation, c/f malignancy, infectious workup negative, transferred to [facility] for bronchoscopy with interventional pulmonology.    HPI:   84F with hx of TAVR (2019), COPD (on 3L PRN O2), HLD, S1 Fracture (02/2025 no surgical intervention) initially admitted to [facility] 3/31-4/4 for fever, found to have new LL mass like consolidation, c/f malignancy, infectious workup negative, transferred to [facility] for bronchoscopy with interventional pulmonology. Patient denies fevers, chills, cp, sob, abdominal pain, n/v/d, dysuria, sick contacts, recent travel. Patient was born in [place] and has not travelled out of the country. 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 Infectious workup at including blood culture, UA, urine culture, quantiferon have been negative. Patient was being treated with Zosyn due to concern for superimposed LLL pneumonia. She is s/p bronchoscopy with biopsy 4/7. She was noted to be febrile post-op. ID asked to help manage.          prior hospital charts reviewed [x  ]  primary team notes reviewed [ x ]  other consultant notes reviewed [ x ]    PAST MEDICAL & SURGICAL HISTORY:  HTN (hypertension)  COPD (chronic obstructive pulmonary disease)  HLD (hyperlipidemia)  S/P TAVR (transcatheter aortic valve replacement)    Allergies  tetracycline (Rash; Urticaria)    ANTIMICROBIALS:  vancomycin  IVPB (4/7 x1)  fluconAZOLE   Tablet (4/8 x1)    active:  piperacillin/tazobactam IVPB.. 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 [employer]     FAMILY HISTORY:  Mother with CAD  No known family history of malignancy     REVIEW OF SYSTEMS  [  ] ROS unobtainable because:    [  ] All other systems negative except as noted below:	    Constitutional:  [ ] fever [ ] chills  [ ] weight loss  [ ] weakness  Skin:  [ ] rash [ ] phlebitis	  Eyes: [ ] icterus [ ] pain  [ ] discharge	  ENMT: [ ] sore throat  [ ] thrush [ ] ulcers [ ] exudates  Respiratory: [ ] dyspnea [ ] hemoptysis [x ] chronic cough [ ] sputum	  Cardiovascular:  [ ] chest pain [ ] palpitations [ ] edema	  Gastrointestinal:  [ ] nausea [ ] vomiting [ ] diarrhea [ ] constipation [ ] pain	  Genitourinary:  [ ] dysuria [ ] frequency [ ] hematuria [ ] discharge [ ] flank pain  [ ] incontinence  Musculoskeletal:  [ ] myalgias [ ] arthralgias [ ] arthritis  [ ] back pain  Neurological:  [ ] headache [ ] seizures  [ ] confusion/altered mental status  Psychiatric:  [ ] anxiety [ ] depression	  Hematology/Lymphatics:  [ ] lymphadenopathy  Endocrine:  [ ] adrenal [ ] thyroid  Allergic/Immunologic:	 [ ] transplant [ ] seasonal    Vital Signs Last 24 Hrs  T(F): 97.3 (04-08-25 @ 10:05), Max: 102.2 (04-07-25 @ 16:05)  Vital Signs Last 24 Hrs  HR: 80 (04-08-25 @ 10:05) (79 - 111)  BP: 115/55 (04-08-25 @ 06:00) (76/50 - 119/50)  RR: 16 (04-08-25 @ 10:05)  SpO2: 98% (04-08-25 @ 10:05) (93% - 100%)  Wt(kg): --    PHYSICAL EXAM:  General: in no acute distress  HEENT:  mucous membranes moist   CV: +S1/S2, RRR, no murmurs heard  Lungs: decreased breath sounds bilaterally, on 3L NC  Abd:  BS4+, Soft, NTND  : No suprapubic tenderness  Neuro: AAOx3  Ext: No cyanosis, no edema  Msk: freely moving upper and lower extremities  Skin: No rash, no phlebitis                              7.2    16.62 )-----------( 340      ( 08 Apr 2025 06:26 )             24.3     138  |  100  |  22  ----------------------------<  113[H]  4.2   |  25  |  1.08    Ca    7.7[L]      08 Apr 2025 06:26  Phos  5.5     04-07  Mg     2.00     04-07    TPro  5.8[L]  /  Alb  2.9[L]  /  TBili  0.3  /  DBili  x   /  AST  10  /  ALT  13  /  AlkPhos  60  04-08    Urinalysis Basic - ( 08 Apr 2025 06:26 )  Urinalysis (04.07.25 @ 06:12)   pH Urine: 5.5  Glucose Qualitative, Urine: Negative mg/dL  Blood, Urine: Trace  Color: Dark Yellow  Urine Appearance: Turbid  Bilirubin: Small  Ketone - Urine: Negative mg/dL  Specific Gravity: 1.030  Protein, Urine: 30 mg/dL  Urobilinogen: 1.0 mg/dL  Nitrite: Negative  Leukocyte Esterase Concentration: Moderate    MICROBIOLOGY:  Culture - Tissue with Gram Stain (collected 07 Apr 2025 17:00)  Source: Tissue LEFT LOWER LOBE FORCEP BIOPSYe  Gram Stain (08 Apr 2025 01:37):    No polymorphonuclear cells seen per low power field    No organisms seen per oil power field    Culture - Fungal, Tissue (collected 07 Apr 2025 17:00)  Source: Tissue LEFT LOWER LOBE FORCEP BIO  Preliminary Report (08 Apr 2025 08:02):    Testing in progress    Culture - Fungal, Bronchial (collected 07 Apr 2025 15:39)  Source: Bronchial BAL LLL  Preliminary Report (08 Apr 2025 07:48):    Testing in progress    Culture - Bronchial (collected 07 Apr 2025 15:39)  Source: Bronchial BAL LLL  Gram Stain (07 Apr 2025 22:51):    Moderate polymorphonuclear leukocytes per low power field    No squamous epithelial cells per low power field    No organisms seen per oil power field    Culture - Blood (collected 07 Apr 2025 07:05)  Source: Blood Blood-Peripheral  Preliminary Report (08 Apr 2025 09:02):    No growth at 24 hours    Culture - Blood (collected 07 Apr 2025 06:10)  Source: Blood Blood-Peripheral  Preliminary Report (08 Apr 2025 09:02):    No growth at 24 hours    CMV IgG Antibody: <0.20 U/mL (04-03-25 @ 06:45)  Quantiferon is indeterminant    RADIOLOGY:  below radiology personally reviewed and agree with finding      Xray Chest 1 View- PORTABLE-Urgent (Xray Chest 1 View- PORTABLE-Urgent .) (04.07.25 @ 16:37) >  IMPRESSION:  New left basilar hazy opacity, which may represent pleural effusion and/or atelectasis.  Redemonstrated left retrocardiac nodular opacity, better appreciated CT chest 4/1/2025.     Xray Wrist 3 Views, Left (04.03.25 @ 13:55) >  IMPRESSION:  No fractures or dislocations.  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 Preserved remaining joint spaces andno joint margin erosions.  Carpal bones normally aligned.  Neutral ulnar variance.  Generalized osteopenia otherwise no discrete lytic or blastic lesions.    CT Chest w/ IV Cont (04.01.25 @ 18:15) >  IMPRESSION:  2.4 x 2.4 cm irregular noncalcified masslike density medial aspect of the left lower lobe abutting the pleura which is new since October 2019. Presumed neoplastic etiology until proven otherwise. PET/CT correlation recommended for further evaluation.  2 cm left adrenal nodule previously measured 1.2 cm October 2019 with attenuation values at that time consistent with adenoma.  Spleen is enlarged measuring 15.5 cm. and increased in size from prior exam October 2019. No focal defects.  Scattered subcentimeter periaortic lymph nodes. Small bilateral external iliac lymph nodes measuring up to 1.1 x 0.9 cm.      Xray Chest 1 View- PORTABLE-Urgent (03.31.25 @ 19:26) >  IMPRESSION:  Left retrocardiac nodular opacity, better appreciated in CT of the chest. Follow-up dedicated CT chest report.    CT Chest No Cont (03.31.25 @ 19:20) >  IMPRESSION:  Left lower lobe 2.4 cm nodule new since October 2, 2019 concerning for neoplasm, possibly primary lung neoplasm.  Enlargement of the partially imaged spleen with interval increase in size since 2019.    CT Lumbar Spine No Cont (03.31.25 @ 19:20) >  IMPRESSION:  Age-indeterminate S1 superior endplate fracture. No prior study available for comparison at this time. No traumatic malalignment.  Multilevel degenerative changes, as above.  Sigmoid colon diverticulosis.

## 2025-04-08 NOTE — PROGRESS NOTE ADULT - PROBLEM SELECTOR PLAN 8
DVT ppx: lovenox  DIET: DASH   DISPO: pending clinical course   plan of care d/w pt at bedside   case d/w ACP

## 2025-04-08 NOTE — CONSULT NOTE ADULT - ASSESSMENT
Impression/Hospital Course:84F with hx of TAVR (2019), COPD (on 3L PRN O2), HLD, S1 Fracture (02/2025 no surgical intervention) initially admitted to [facility] 3/31-4/4 for fever, found to have new LL mass like consolidation, c/f malignancy, infectious workup negative, transferred to [facility] for bronchoscopy with interventional pulmonology. CTchest with IV contrast with 2.4 x 2.4 cm irregular noncalcified masslike density medial aspect of the left lower lobe abutting the pleura.  Blood culture, UA, urine culture.  Quantiferon noted to be indeterminate. Patient was born in [place] and has not travelled out of the country. 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 She has no known exposure to TB, never worked in health care or been to [facility]. Patient was being treated with Zosyn due to concern for superimposed LLL pneumonia. She is s/p bronchoscopy with biopsy 4/7. She was noted to be febrile post-op. ID asked to help manage.     Antibiotics:  S/p Vancomycin 4/7   Zosyn 4/4-    Assessment:  #Fever  #LLL mass  #Concern for pneumonia  #Leukocytosis  #Indeterminate quantiferon - likely in the setting on acute illness. Patient has low risk factors for TB    Recommendations:  -Continue with Zosyn for now  -Monitor vitals  -Follow up blood culture  -If clinically stable and blood cultures negative, would discontinue antibiotics  -Follow up AFB, bacterial/fungal cultures, and flow cytometry of the biopsy specimen    Case discussed with Dr. Connor. Recommendations were made to the primary team.       Marck Tovar DO, PGY-4  Infectious Disease Fellow  [facility] Teams Preferred  After 5pm/weekends call [phone]

## 2025-04-08 NOTE — PROGRESS NOTE ADULT - ASSESSMENT
84F with hx of TAVR (2019), COPD (on PRN O2), HLD, S1 Fracture (02/2025 no surgical intervention) initially admitted to [facility] 3/31-4/4 for fever, found to have new LLL mass like consolidation, c/f malignancy, infectious workup negative, transferred to [facility] for bronchoscopy with interventional pulmonology.

## 2025-04-08 NOTE — CONSULT NOTE ADULT - ASSESSMENT
EKG: SR ST abn +delta wave  A/P:  84F with hx of TAVR (2019), COPD (on PRN O2), HLD, S1 Fracture (02/2025 no surgical intervention) initially admitted to [facility] 3/31-4/4 for fever, found to have new LLL mass like consolidation, c/f malignancy, infectious workup negative, transferred to [facility] for bronchoscopy with interventional pulmonology. S/p bronchoscopy 4/7. Cardiology consult called for intra-op ST changes on tele.    1. ST changes   - has c/o chest pain with cough  - repeat EKG   - recommend TTE    2. lung mass  - s/p bronchoscopy 4/7/25 with biopsy of the LLL, EBUS with lymph node biopsy  - f/u interventional pulm    3. hx AS s/p TAVR   - has been asymptomatic    4. HTN  - c/w losartan

## 2025-04-08 NOTE — PROGRESS NOTE ADULT - SUBJECTIVE AND OBJECTIVE BOX
Patient is a 84y old  Female who presents with a chief complaint of Per chart, Pt is 84F with hx of TAVR (2019), COPD (on PRN O2), HLD, S1 Fracture (02/2025 no surgical intervention) initially admitted to [facility] 3/31-4/4 for fever, found to have new LLL mass like consolidation, c/f malignancy, infectious workup negative, transferred to [facility] for bronchoscopy with interventional pulmonology.   (05 Apr 2025 12:19)      SUBJECTIVE / OVERNIGHT EVENTS:    MEDICATIONS  (STANDING):  albuterol/ipratropium for Nebulization 3 milliLiter(s) Nebulizer every 6 hours  benzocaine/menthol Lozenge 1 Lozenge Oral two times a day  clotrimazole 1% Vaginal Cream 1 Applicatorful Vaginal at bedtime  fluconAZOLE   Tablet 150 milliGRAM(s) Oral once  fluticasone propionate/ salmeterol 250-50 MICROgram(s) Diskus 1 Dose(s) Inhalation two times a day  gabapentin 100 milliGRAM(s) Oral two times a day  lidocaine   4% Patch 1 Patch Transdermal daily  losartan 50 milliGRAM(s) Oral daily  pantoprazole    Tablet 40 milliGRAM(s) Oral before breakfast  piperacillin/tazobactam IVPB.. 3.375 Gram(s) IV Intermittent every 8 hours  polyethylene glycol 3350 17 Gram(s) Oral daily  rosuvastatin 40 milliGRAM(s) Oral at bedtime  senna 2 Tablet(s) Oral at bedtime    MEDICATIONS  (PRN):  acetaminophen     Tablet .. 650 milliGRAM(s) Oral every 6 hours PRN Temp greater or equal to 38C (100.4F), Mild Pain (1 - 3), Moderate Pain (4 - 6)  guaiFENesin Oral Liquid (Sugar-Free) 100 milliGRAM(s) Oral every 6 hours PRN Cough  melatonin 3 milliGRAM(s) Oral at bedtime PRN Insomnia  promethazine 25 milliGRAM(s) Oral every 6 hours PRN allergies  traMADol 50 milliGRAM(s) Oral every 8 hours PRN Severe Pain (7 - 10)      Vital Signs Last 24 Hrs  T(C): 36.4 (08 Apr 2025 06:00), Max: 39 (07 Apr 2025 16:05)  T(F): 97.5 (08 Apr 2025 06:00), Max: 102.2 (07 Apr 2025 16:05)  HR: 81 (08 Apr 2025 06:00) (79 - 111)  BP: 115/55 (08 Apr 2025 06:00) (76/50 - 119/50)  BP(mean): 64 (07 Apr 2025 21:00) (51 - 96)  RR: 16 (08 Apr 2025 06:00) (14 - 43)  SpO2: 98% (08 Apr 2025 06:00) (93% - 100%)    Parameters below as of 08 Apr 2025 09:15  Patient On (Oxygen Delivery Method): nasal cannula      CAPILLARY BLOOD GLUCOSE        I&O's Summary    07 Apr 2025 07:01  -  08 Apr 2025 07:00  --------------------------------------------------------  IN: 1525 mL / OUT: 520 mL / NET: 1005 mL        PHYSICAL EXAM:  GENERAL: NAD, well-developed  HEAD:  Atraumatic, Normocephalic  EYES: EOMI, PERRLA, conjunctiva and sclera clear  NECK: Supple, No JVD  CHEST/LUNG: Clear to auscultation bilaterally; No wheeze  HEART: Regular rate and rhythm; No murmurs, rubs, or gallops  ABDOMEN: Soft, Nontender, Nondistended; Bowel sounds present  EXTREMITIES:  2+ Peripheral Pulses, No clubbing, cyanosis, or edema  PSYCH: AAOx3  NEUROLOGY: non-focal  SKIN: No rashes or lesions    LABS:                        7.2    16.62 )-----------( 340      ( 08 Apr 2025 06:26 )             24.3     04-08    138  |  100  |  22  ----------------------------<  113[H]  4.2   |  25  |  1.08    Ca    7.7[L]      08 Apr 2025 06:26  Phos  5.5     04-07  Mg     2.00     04-07    TPro  5.8[L]  /  Alb  2.9[L]  /  TBili  0.3  /  DBili  x   /  AST  10  /  ALT  13  /  AlkPhos  60  04-08    PT/INR - ( 07 Apr 2025 06:05 )   PT: 15.8 sec;   INR: 1.37 ratio         PTT - ( 07 Apr 2025 06:05 )  PTT:39.7 sec      Urinalysis Basic - ( 08 Apr 2025 06:26 )    Color: x / Appearance: x / SG: x / pH: x  Gluc: 113 mg/dL / Ketone: x  / Bili: x / Urobili: x   Blood: x / Protein: x / Nitrite: x   Leuk Esterase: x / RBC: x / WBC x   Sq Epi: x / Non Sq Epi: x / Bacteria: x        RADIOLOGY & ADDITIONAL TESTS:    Imaging Personally Reviewed:    Consultant(s) Notes Reviewed:      Care Discussed with Consultants/Other Providers:   Patient is a 84y old  Female who presents with a chief complaint of Per chart, Pt is 84F with hx of TAVR (2019), COPD (on PRN O2), HLD, S1 Fracture (02/2025 no surgical intervention) initially admitted to [facility] 3/31-4/4 for fever, found to have new LLL mass like consolidation, c/f malignancy, infectious workup negative, transferred to [facility] for bronchoscopy with interventional pulmonology.   (05 Apr 2025 12:19)      SUBJECTIVE / OVERNIGHT EVENTS: patient seen and examined by bedside , pt more alert and awake today ,  also afebrile today, pt was febrile to 102 yesterday after procedure . Pt c/o feeling tired , has cough, but denies headache, dizziness, , CP, Palpitations , N/V/D, abdominal pain       MEDICATIONS  (STANDING):  albuterol/ipratropium for Nebulization 3 milliLiter(s) Nebulizer every 6 hours  benzocaine/menthol Lozenge 1 Lozenge Oral two times a day  clotrimazole 1% Vaginal Cream 1 Applicatorful Vaginal at bedtime  fluconAZOLE   Tablet 150 milliGRAM(s) Oral once  fluticasone propionate/ salmeterol 250-50 MICROgram(s) Diskus 1 Dose(s) Inhalation two times a day  gabapentin 100 milliGRAM(s) Oral two times a day  lidocaine   4% Patch 1 Patch Transdermal daily  losartan 50 milliGRAM(s) Oral daily  pantoprazole    Tablet 40 milliGRAM(s) Oral before breakfast  piperacillin/tazobactam IVPB.. 3.375 Gram(s) IV Intermittent every 8 hours  polyethylene glycol 3350 17 Gram(s) Oral daily  rosuvastatin 40 milliGRAM(s) Oral at bedtime  senna 2 Tablet(s) Oral at bedtime    MEDICATIONS  (PRN):  acetaminophen     Tablet .. 650 milliGRAM(s) Oral every 6 hours PRN Temp greater or equal to 38C (100.4F), Mild Pain (1 - 3), Moderate Pain (4 - 6)  guaiFENesin Oral Liquid (Sugar-Free) 100 milliGRAM(s) Oral every 6 hours PRN Cough  melatonin 3 milliGRAM(s) Oral at bedtime PRN Insomnia  promethazine 25 milliGRAM(s) Oral every 6 hours PRN allergies  traMADol 50 milliGRAM(s) Oral every 8 hours PRN Severe Pain (7 - 10)      Vital Signs Last 24 Hrs  T(C): 36.4 (08 Apr 2025 06:00), Max: 39 (07 Apr 2025 16:05)  T(F): 97.5 (08 Apr 2025 06:00), Max: 102.2 (07 Apr 2025 16:05)  HR: 81 (08 Apr 2025 06:00) (79 - 111)  BP: 115/55 (08 Apr 2025 06:00) (76/50 - 119/50)  BP(mean): 64 (07 Apr 2025 21:00) (51 - 96)  RR: 16 (08 Apr 2025 06:00) (14 - 43)  SpO2: 98% (08 Apr 2025 06:00) (93% - 100%)    Parameters below as of 08 Apr 2025 09:15  Patient On (Oxygen Delivery Method): nasal cannula      CAPILLARY BLOOD GLUCOSE        I&O's Summary    07 Apr 2025 07:01  -  08 Apr 2025 07:00  --------------------------------------------------------  IN: 1525 mL / OUT: 520 mL / NET: 1005 mL      PHYSICAL EXAM:  CONSTITUTIONAL: NAD,  EYES: PERRLA; conjunctiva and sclera clear  NECK: Supple, no palpable masses;  RESPIRATORY: Normal respiratory effort; basilar crackles  bilaterally  CARDIOVASCULAR: Regular rate and rhythm, normal S1 and S2, no murmur/rub/gallop; No lower extremity edema; Peripheral pulses are 2+ bilaterally  ABDOMEN: Nontender to palpation, normoactive bowel sounds, no rebound/guarding;   MUSCLOSKELETAL:   no clubbing or cyanosis of digits; no joint swelling or tenderness to palpation  PSYCH: A+O to person, place, and time; affect appropriate  NEUROLOGY: CN 2-12 are intact and symmetric; no gross sensory deficits;   SKIN: No rashes;       LABS:                        7.2    16.62 )-----------( 340      ( 08 Apr 2025 06:26 )             24.3     04-08    138  |  100  |  22  ----------------------------<  113[H]  4.2   |  25  |  1.08    Ca    7.7[L]      08 Apr 2025 06:26  Phos  5.5     04-07  Mg     2.00     04-07    TPro  5.8[L]  /  Alb  2.9[L]  /  TBili  0.3  /  DBili  x   /  AST  10  /  ALT  13  /  AlkPhos  60  04-08    PT/INR - ( 07 Apr 2025 06:05 )   PT: 15.8 sec;   INR: 1.37 ratio         PTT - ( 07 Apr 2025 06:05 )  PTT:39.7 sec      Urinalysis Basic - ( 08 Apr 2025 06:26 )    Color: x / Appearance: x / SG: x / pH: x  Gluc: 113 mg/dL / Ketone: x  / Bili: x / Urobili: x   Blood: x / Protein: x / Nitrite: x   Leuk Esterase: x / RBC: x / WBC x   Sq Epi: x / Non Sq Epi: x / Bacteria: x        RADIOLOGY & ADDITIONAL TESTS:  < from: Xray Chest 1 View- PORTABLE-Urgent (Xray Chest 1 View- PORTABLE-Urgent .) (04.07.25 @ 16:37) >  FINDINGS:  Status post TAVR.  The cardiac silhouette is not well evaluated on AP film. Aortic knob   calcifications.  No new focal consolidations. Mild pulmonary vascular congestion.  Left retrocardiac nodular opacity, better appreciated on CT chest 4/1/2025  New left basilar hazy opacity with small left pleural effusion.  There is no pneumothorax or right pleural effusion.  No acute osseous abnormalities    IMPRESSION:    New left basilar hazy opacity, which may represent pleural effusion   and/or atelectasis.  Redemonstrated left retrocardiac nodular opacity, better appreciated CT   chest 4/1/2025.    < end of copied text >    Imaging Personally Reviewed:    Consultant(s) Notes Reviewed:  interventional   pulm     Care Discussed with Consultants/Other Providers:

## 2025-04-09 ENCOUNTER — RESULT REVIEW (OUTPATIENT)
Age: 85
End: 2025-04-09

## 2025-04-09 DIAGNOSIS — I49.9 CARDIAC ARRHYTHMIA, UNSPECIFIED: ICD-10-CM

## 2025-04-09 LAB
ANION GAP SERPL CALC-SCNC: 11 MMOL/L — SIGNIFICANT CHANGE UP (ref 7–14)
BASOPHILS # BLD AUTO: 0.1 K/UL — SIGNIFICANT CHANGE UP (ref 0–0.2)
BASOPHILS NFR BLD AUTO: 0.5 % — SIGNIFICANT CHANGE UP (ref 0–2)
BUN SERPL-MCNC: 18 MG/DL — SIGNIFICANT CHANGE UP (ref 7–23)
CALCIUM SERPL-MCNC: 8.4 MG/DL — SIGNIFICANT CHANGE UP (ref 8.4–10.5)
CHLORIDE SERPL-SCNC: 105 MMOL/L — SIGNIFICANT CHANGE UP (ref 98–107)
CO2 SERPL-SCNC: 25 MMOL/L — SIGNIFICANT CHANGE UP (ref 22–31)
CREAT SERPL-MCNC: 0.94 MG/DL — SIGNIFICANT CHANGE UP (ref 0.5–1.3)
CULTURE RESULTS: NO GROWTH — SIGNIFICANT CHANGE UP
EGFR: 60 ML/MIN/1.73M2 — SIGNIFICANT CHANGE UP
EGFR: 60 ML/MIN/1.73M2 — SIGNIFICANT CHANGE UP
EOSINOPHIL # BLD AUTO: 0.19 K/UL — SIGNIFICANT CHANGE UP (ref 0–0.5)
EOSINOPHIL NFR BLD AUTO: 1 % — SIGNIFICANT CHANGE UP (ref 0–6)
GLUCOSE SERPL-MCNC: 100 MG/DL — HIGH (ref 70–99)
HCT VFR BLD CALC: 28.7 % — LOW (ref 34.5–45)
HGB BLD-MCNC: 8.6 G/DL — LOW (ref 11.5–15.5)
IANC: 12.08 K/UL — HIGH (ref 1.8–7.4)
IMM GRANULOCYTES NFR BLD AUTO: 10.6 % — HIGH (ref 0–0.9)
LYMPHOCYTES # BLD AUTO: 1.2 K/UL — SIGNIFICANT CHANGE UP (ref 1–3.3)
LYMPHOCYTES # BLD AUTO: 6.2 % — LOW (ref 13–44)
MAGNESIUM SERPL-MCNC: 2.3 MG/DL — SIGNIFICANT CHANGE UP (ref 1.6–2.6)
MCHC RBC-ENTMCNC: 16.8 PG — LOW (ref 27–34)
MCHC RBC-ENTMCNC: 30 G/DL — LOW (ref 32–36)
MCV RBC AUTO: 56.1 FL — LOW (ref 80–100)
MONOCYTES # BLD AUTO: 3.69 K/UL — HIGH (ref 0–0.9)
MONOCYTES NFR BLD AUTO: 19.1 % — HIGH (ref 2–14)
NEUTROPHILS # BLD AUTO: 12.08 K/UL — HIGH (ref 1.8–7.4)
NEUTROPHILS NFR BLD AUTO: 62.6 % — SIGNIFICANT CHANGE UP (ref 43–77)
NRBC # BLD AUTO: 0 K/UL — SIGNIFICANT CHANGE UP (ref 0–0)
NRBC # FLD: 0 K/UL — SIGNIFICANT CHANGE UP (ref 0–0)
NRBC BLD AUTO-RTO: 0 /100 WBCS — SIGNIFICANT CHANGE UP (ref 0–0)
PHOSPHATE SERPL-MCNC: 4 MG/DL — SIGNIFICANT CHANGE UP (ref 2.5–4.5)
PLATELET # BLD AUTO: 434 K/UL — HIGH (ref 150–400)
POTASSIUM SERPL-MCNC: 4 MMOL/L — SIGNIFICANT CHANGE UP (ref 3.5–5.3)
POTASSIUM SERPL-SCNC: 4 MMOL/L — SIGNIFICANT CHANGE UP (ref 3.5–5.3)
RBC # BLD: 5.12 M/UL — SIGNIFICANT CHANGE UP (ref 3.8–5.2)
RBC # FLD: 20.7 % — HIGH (ref 10.3–14.5)
SODIUM SERPL-SCNC: 141 MMOL/L — SIGNIFICANT CHANGE UP (ref 135–145)
SPECIMEN SOURCE: SIGNIFICANT CHANGE UP
WBC # BLD: 19.31 K/UL — HIGH (ref 3.8–10.5)
WBC # FLD AUTO: 19.31 K/UL — HIGH (ref 3.8–10.5)

## 2025-04-09 PROCEDURE — G0545: CPT

## 2025-04-09 PROCEDURE — 99233 SBSQ HOSP IP/OBS HIGH 50: CPT

## 2025-04-09 PROCEDURE — 99232 SBSQ HOSP IP/OBS MODERATE 35: CPT

## 2025-04-09 PROCEDURE — 93306 TTE W/DOPPLER COMPLETE: CPT | Mod: 26

## 2025-04-09 RX ORDER — BISMUTH SUBSALICYLATE 262 MG/1
15 TABLET, CHEWABLE ORAL EVERY 8 HOURS
Refills: 0 | Status: DISCONTINUED | OUTPATIENT
Start: 2025-04-09 | End: 2025-04-21

## 2025-04-09 RX ADMIN — CLOTRIMAZOLE 1 APPLICATORFUL: 1 CREAM TOPICAL at 22:46

## 2025-04-09 RX ADMIN — Medication 25 GRAM(S): at 13:10

## 2025-04-09 RX ADMIN — Medication 1 DOSE(S): at 22:46

## 2025-04-09 RX ADMIN — BISMUTH SUBSALICYLATE 15 MILLILITER(S): 262 TABLET, CHEWABLE ORAL at 13:11

## 2025-04-09 RX ADMIN — Medication 25 GRAM(S): at 05:07

## 2025-04-09 RX ADMIN — ROSUVASTATIN CALCIUM 40 MILLIGRAM(S): 20 TABLET, FILM COATED ORAL at 22:47

## 2025-04-09 RX ADMIN — Medication 1 LOZENGE: at 18:16

## 2025-04-09 RX ADMIN — Medication 1 LOZENGE: at 05:08

## 2025-04-09 RX ADMIN — CLOTRIMAZOLE 1 APPLICATORFUL: 1 CREAM TOPICAL at 13:12

## 2025-04-09 RX ADMIN — DEXTROMETHORPHAN HBR, GUAIFENESIN 100 MILLIGRAM(S): 200 LIQUID ORAL at 08:56

## 2025-04-09 RX ADMIN — Medication 650 MILLIGRAM(S): at 05:37

## 2025-04-09 RX ADMIN — Medication 2 TABLET(S): at 22:48

## 2025-04-09 RX ADMIN — Medication 1 DOSE(S): at 08:35

## 2025-04-09 RX ADMIN — Medication 40 MILLIGRAM(S): at 05:07

## 2025-04-09 RX ADMIN — GABAPENTIN 100 MILLIGRAM(S): 400 CAPSULE ORAL at 18:16

## 2025-04-09 RX ADMIN — GABAPENTIN 100 MILLIGRAM(S): 400 CAPSULE ORAL at 05:06

## 2025-04-09 RX ADMIN — Medication 650 MILLIGRAM(S): at 05:07

## 2025-04-09 RX ADMIN — LOSARTAN POTASSIUM 50 MILLIGRAM(S): 100 TABLET, FILM COATED ORAL at 05:06

## 2025-04-09 NOTE — PROGRESS NOTE ADULT - ASSESSMENT
84F with hx TAVR (2019), COPD (on PRN O2), HLD, S1 Fracture (02/2025 no surgical intervention) initially admitted to [facility] 3/31-4/4 after a fall.  Noted to have fever, found to have new LLL mass like consolidation, c/f malignancy, infectious workup negative [(-) BC / UA / CXR].  Patient was transferred to [facility] for bronchoscopy with interventional pulmonology.  Underwent bronchoscopy and biopsy 4/7 complicated by isolated fever 102.  BC drawn and zosyn continued.  ID asked to help management.    Patient with LLL lung mass now s/p biopsy 4/7 with isolated post procedural fever with leukocytosis  - fever has resolved  - BC negative  - baseline WBC 20K prior to admission   - leukocytosis may be reactive to underlying malignancy (lung mass, splenomegaly)  - patient completed empiric course of zosyn for possible underlying pneumonia  - would monitor off antibiotics

## 2025-04-09 NOTE — PROGRESS NOTE ADULT - SUBJECTIVE AND OBJECTIVE BOX
Patient is a 84y old  Female who presents with a chief complaint of transfer from [facility] (08 Apr 2025 10:27)      SUBJECTIVE / OVERNIGHT EVENTS:    MEDICATIONS  (STANDING):  albuterol/ipratropium for Nebulization 3 milliLiter(s) Nebulizer every 6 hours  benzocaine/menthol Lozenge 1 Lozenge Oral two times a day  clotrimazole 1% Vaginal Cream 1 Applicatorful Vaginal at bedtime  fluticasone propionate/ salmeterol 250-50 MICROgram(s) Diskus 1 Dose(s) Inhalation two times a day  gabapentin 100 milliGRAM(s) Oral two times a day  lidocaine   4% Patch 1 Patch Transdermal daily  losartan 50 milliGRAM(s) Oral daily  pantoprazole    Tablet 40 milliGRAM(s) Oral before breakfast  piperacillin/tazobactam IVPB.. 3.375 Gram(s) IV Intermittent every 8 hours  polyethylene glycol 3350 17 Gram(s) Oral daily  rosuvastatin 40 milliGRAM(s) Oral at bedtime  senna 2 Tablet(s) Oral at bedtime    MEDICATIONS  (PRN):  acetaminophen     Tablet .. 650 milliGRAM(s) Oral every 6 hours PRN Temp greater or equal to 38C (100.4F), Mild Pain (1 - 3), Moderate Pain (4 - 6)  guaiFENesin Oral Liquid (Sugar-Free) 100 milliGRAM(s) Oral every 6 hours PRN Cough  melatonin 3 milliGRAM(s) Oral at bedtime PRN Insomnia  promethazine 25 milliGRAM(s) Oral every 6 hours PRN allergies  traMADol 50 milliGRAM(s) Oral every 8 hours PRN Severe Pain (7 - 10)      Vital Signs Last 24 Hrs  T(C): 36.9 (09 Apr 2025 06:00), Max: 36.9 (09 Apr 2025 06:00)  T(F): 98.4 (09 Apr 2025 06:00), Max: 98.4 (09 Apr 2025 06:00)  HR: 81 (09 Apr 2025 06:00) (80 - 90)  BP: 120/50 (09 Apr 2025 06:00) (101/54 - 120/50)  BP(mean): --  RR: 16 (09 Apr 2025 06:00) (16 - 18)  SpO2: 100% (09 Apr 2025 06:00) (94% - 100%)    Parameters below as of 09 Apr 2025 06:00  Patient On (Oxygen Delivery Method): nasal cannula  O2 Flow (L/min): 3    CAPILLARY BLOOD GLUCOSE        I&O's Summary    08 Apr 2025 07:01  -  09 Apr 2025 07:00  --------------------------------------------------------  IN: 1100 mL / OUT: 1500 mL / NET: -400 mL        PHYSICAL EXAM:  GENERAL: NAD, well-developed  HEAD:  Atraumatic, Normocephalic  EYES: EOMI, PERRLA, conjunctiva and sclera clear  NECK: Supple, No JVD  CHEST/LUNG: Clear to auscultation bilaterally; No wheeze  HEART: Regular rate and rhythm; No murmurs, rubs, or gallops  ABDOMEN: Soft, Nontender, Nondistended; Bowel sounds present  EXTREMITIES:  2+ Peripheral Pulses, No clubbing, cyanosis, or edema  PSYCH: AAOx3  NEUROLOGY: non-focal  SKIN: No rashes or lesions    LABS:                        8.2    19.62 )-----------( 365      ( 08 Apr 2025 15:35 )             27.4     04-08    138  |  100  |  22  ----------------------------<  113[H]  4.2   |  25  |  1.08    Ca    7.7[L]      08 Apr 2025 06:26  Phos  5.5     04-07  Mg     2.00     04-07    TPro  5.8[L]  /  Alb  2.9[L]  /  TBili  0.3  /  DBili  x   /  AST  10  /  ALT  13  /  AlkPhos  60  04-08          Urinalysis Basic - ( 08 Apr 2025 06:26 )    Color: x / Appearance: x / SG: x / pH: x  Gluc: 113 mg/dL / Ketone: x  / Bili: x / Urobili: x   Blood: x / Protein: x / Nitrite: x   Leuk Esterase: x / RBC: x / WBC x   Sq Epi: x / Non Sq Epi: x / Bacteria: x        RADIOLOGY & ADDITIONAL TESTS:    Imaging Personally Reviewed:    Consultant(s) Notes Reviewed:      Care Discussed with Consultants/Other Providers:   Patient is a 84y old  Female who presents with a chief complaint of transfer from [facility] (08 Apr 2025 10:27)      SUBJECTIVE / OVERNIGHT EVENTS: patient seen and examined by bedside, pt c/o cough  and chest discomfort with coughing , pt afebrile , denies headache, dizziness, , Palpitations , N/V/D, abdominal pain      MEDICATIONS  (STANDING):  albuterol/ipratropium for Nebulization 3 milliLiter(s) Nebulizer every 6 hours  benzocaine/menthol Lozenge 1 Lozenge Oral two times a day  clotrimazole 1% Vaginal Cream 1 Applicatorful Vaginal at bedtime  fluticasone propionate/ salmeterol 250-50 MICROgram(s) Diskus 1 Dose(s) Inhalation two times a day  gabapentin 100 milliGRAM(s) Oral two times a day  lidocaine   4% Patch 1 Patch Transdermal daily  losartan 50 milliGRAM(s) Oral daily  pantoprazole    Tablet 40 milliGRAM(s) Oral before breakfast  piperacillin/tazobactam IVPB.. 3.375 Gram(s) IV Intermittent every 8 hours  polyethylene glycol 3350 17 Gram(s) Oral daily  rosuvastatin 40 milliGRAM(s) Oral at bedtime  senna 2 Tablet(s) Oral at bedtime    MEDICATIONS  (PRN):  acetaminophen     Tablet .. 650 milliGRAM(s) Oral every 6 hours PRN Temp greater or equal to 38C (100.4F), Mild Pain (1 - 3), Moderate Pain (4 - 6)  guaiFENesin Oral Liquid (Sugar-Free) 100 milliGRAM(s) Oral every 6 hours PRN Cough  melatonin 3 milliGRAM(s) Oral at bedtime PRN Insomnia  promethazine 25 milliGRAM(s) Oral every 6 hours PRN allergies  traMADol 50 milliGRAM(s) Oral every 8 hours PRN Severe Pain (7 - 10)      Vital Signs Last 24 Hrs  T(C): 36.9 (09 Apr 2025 06:00), Max: 36.9 (09 Apr 2025 06:00)  T(F): 98.4 (09 Apr 2025 06:00), Max: 98.4 (09 Apr 2025 06:00)  HR: 81 (09 Apr 2025 06:00) (80 - 90)  BP: 120/50 (09 Apr 2025 06:00) (101/54 - 120/50)  BP(mean): --  RR: 16 (09 Apr 2025 06:00) (16 - 18)  SpO2: 100% (09 Apr 2025 06:00) (94% - 100%)    Parameters below as of 09 Apr 2025 06:00  Patient On (Oxygen Delivery Method): nasal cannula  O2 Flow (L/min): 3    CAPILLARY BLOOD GLUCOSE        I&O's Summary    08 Apr 2025 07:01  -  09 Apr 2025 07:00  --------------------------------------------------------  IN: 1100 mL / OUT: 1500 mL / NET: -400 mL      PHYSICAL EXAM:  CONSTITUTIONAL: NAD,  EYES: PERRLA; conjunctiva and sclera clear  NECK: Supple, no palpable masses;  RESPIRATORY: Normal respiratory effort; basilar crackles  bilaterally  CARDIOVASCULAR: Regular rate and rhythm, normal S1 and S2, no murmur/rub/gallop;   ABDOMEN: Nontender to palpation, normoactive bowel sounds, no rebound/guarding;   MUSCULOSKELETAL:    no clubbing or cyanosis of digits; no joint swelling or tenderness to palpation  PSYCH: A+O to person, place, and time; affect appropriate  NEUROLOGY: CN 2-12 are intact and symmetric; no gross sensory deficits;         LABS:                        8.2    19.62 )-----------( 365      ( 08 Apr 2025 15:35 )             27.4     04-08    138  |  100  |  22  ----------------------------<  113[H]  4.2   |  25  |  1.08    Ca    7.7[L]      08 Apr 2025 06:26  Phos  5.5     04-07  Mg     2.00     04-07    TPro  5.8[L]  /  Alb  2.9[L]  /  TBili  0.3  /  DBili  x   /  AST  10  /  ALT  13  /  AlkPhos  60  04-08          Urinalysis Basic - ( 08 Apr 2025 06:26 )    Color: x / Appearance: x / SG: x / pH: x  Gluc: 113 mg/dL / Ketone: x  / Bili: x / Urobili: x   Blood: x / Protein: x / Nitrite: x   Leuk Esterase: x / RBC: x / WBC x   Sq Epi: x / Non Sq Epi: x / Bacteria: x        RADIOLOGY & ADDITIONAL TESTS:    Imaging Personally Reviewed:    Consultant(s) Notes Reviewed:  cardiology, ID     Care Discussed with Consultants/Other Providers:

## 2025-04-09 NOTE — PROGRESS NOTE ADULT - PROBLEM SELECTOR PLAN 6
pt was noted to be hypotensive yesterday, now resolved   - on  losartan 50 mg to be held for SBP<110   - monitor vital signs respiratory status stable  - continue advair BID, duoneb q6h

## 2025-04-09 NOTE — PROGRESS NOTE ADULT - PROBLEM SELECTOR PLAN 2
febrile on admission to [facility]  - infectious workup unrevealing - BCx/UA/CXR neg, QuantiFERON-TB Gold, fungal and viral serology pending  - CT chest with LLL lung nodule - management as below, CT CAP without infectious etiology   - ID (Dr. Bui) evaluated at [facility] - rec flow cytometry, AFB, bacterial and fungal cx to be sent with biopsy, , if any of these results come back positive will consult ID  - continue Zosyn (planned for empiric 7 day course per ID at [facility]) -   -pt had worsening leucocytosis yesterday , improved today to 16 , per daughter  her baseline is 14-15  - pt with  EBV IGM+,  -ID Eval requested, will f/u recs febrile on admission to [facility]  - infectious workup unrevealing - BCx/UA/CXR neg, QuantiFERON-TB Gold, fungal and viral serology pending  - CT chest with LLL lung nodule - management as below, CT CAP without infectious etiology   - ID (Dr. Bui) evaluated at [facility] - rec flow cytometry, AFB, bacterial and fungal cx to be sent with biopsy, , , BAL negative for infection at this time , will f/u cytopathology   -on  Zosyn (planned for empiric 7 day course per ID at [facility]) -   -pt had worsening leucocytosis  , per daughter  her baseline is 14-15  - pt with  EBV IGM+ but no PCR detected ,  -ID f/u appreciated, leucocytosis likely reactive vs 2/2 malignancy  - ID rec to monitor off abx , will dc zosyn as pt completed 7 days of empiric treatment

## 2025-04-09 NOTE — PROGRESS NOTE ADULT - PROBLEM SELECTOR PLAN 5
respiratory status stable  - continue advair BID, duoneb q6h WBC 26 today  (was 65 on admission)  - infectious workup unrevealing - BCx/UA/CXR neg, QuantiFERON-TB Gold, fungal and viral serology pending  - ID evaluated at [facility] - empric zosyn x 7 days, rec flow cytometry, AFB, bacterial and fungal cx to be sent with biopsy  pt had worsening leucocytosis  , per daughter  her baseline is 14-15  - pt with  EBV IGM+ but no PCR detected ,  -ID f/u appreciated, leucocytosis likely reactive vs 2/2 malignancy  - ID rec to monitor off abx , will dc zosyn as pt completed 7 days of empiric treatment  - trend WBC

## 2025-04-09 NOTE — PROGRESS NOTE ADULT - ASSESSMENT
84F with hx of TAVR (2019), COPD (on PRN O2), HLD, S1 Fracture (02/2025 no surgical intervention) initially admitted to [facility] 3/31-4/4 for fever, found to have new LLL mass like consolidation, c/f malignancy, infectious workup negative, transferred to [facility] for bronchoscopy with interventional pulmonology.

## 2025-04-09 NOTE — PROGRESS NOTE ADULT - PROBLEM SELECTOR PLAN 8
DVT ppx: lovenox  DIET: DASH   DISPO: pending clinical course   plan of care d/w pt at bedside   case d/w ACP s/p recent fall  - CT spine with s1 age indeterminant fx   - Tylenol/Tramadol PRN, lidocaine patch  - PT eval- NIKHIL

## 2025-04-09 NOTE — PROGRESS NOTE ADULT - PROBLEM SELECTOR PLAN 9
DVT ppx: lovenox  DIET: DASH   DISPO: pending clinical course   plan of care d/w pt at bedside    case d/w daughter on the phone , update provided   case d/w ACP

## 2025-04-09 NOTE — PROGRESS NOTE ADULT - ASSESSMENT
EKG: SR ST abn +delta wave  A/P:  84F with hx of TAVR (2019), COPD (on PRN O2), HLD, S1 Fracture (02/2025 no surgical intervention) initially admitted to [facility] 3/31-4/4 for fever, found to have new LLL mass like consolidation, c/f malignancy, infectious workup negative, transferred to [facility] for bronchoscopy with interventional pulmonology. S/p bronchoscopy 4/7. Cardiology consult called for intra-op ST changes on tele.    1. ST changes   - has c/o chest pain with cough  - repeat EKG   - recommend TTE    2. lung mass  - s/p bronchoscopy 4/7/25 with biopsy of the LLL, EBUS with lymph node biopsy  - f/u interventional pulm    3. hx AS s/p TAVR   - has been asymptomatic    4. HTN  - c/w losartan

## 2025-04-09 NOTE — PROGRESS NOTE ADULT - PROBLEM SELECTOR PLAN 1
LLL noted on imaging  - CT with 2.4cm lung nodule  - IR unable to obtain percutaneous biopsy  - appreciate IP ,s/p navigational bronchoscopy 4/7/25 with transbronchial biopsy of the LLL, EBUS with lymph node biopsies and BAL   post procedure CXR with  no pneumothorax or right pleural effusion.  pt  noted on intraoperative telemetry with abnormal ST segments, , will request  cardiology evaluation pending results  f/u BAL, LN Bx and TBBx results LLL noted on imaging  - CT with 2.4cm lung nodule  - IR unable to obtain percutaneous biopsy  - appreciate IP ,s/p navigational bronchoscopy 4/7/25 with transbronchial biopsy of the LLL, EBUS with lymph node biopsies and BAL   post procedure CXR with  no pneumothorax or right pleural effusion.  - BAL negative for inflection so far   - will f/u cytopathology

## 2025-04-09 NOTE — PROGRESS NOTE ADULT - PROBLEM SELECTOR PLAN 7
s/p recent fall  - CT spine with s1 age indeterminant fx   - Tylenol/Tramadol PRN, lidocaine patch  - PT eval- NIKHIL pt was noted to be hypotensive 4/7, , now resolved   - on  losartan 50 mg to be held for SBP<110   - monitor vital signs

## 2025-04-09 NOTE — PROGRESS NOTE ADULT - SUBJECTIVE AND OBJECTIVE BOX
Cong Marcum MD  Interventional Cardiology / Endovascular Specialist  [place] Office : [phone] [place] [address] [address]. [address]  Tel:   [place] Office : [phone] [place] [address]  Tel: [phone]    Pt lying in bed in NAD   	  MEDICATIONS:  losartan 50 milliGRAM(s) Oral daily    piperacillin/tazobactam IVPB.. 3.375 Gram(s) IV Intermittent every 8 hours    albuterol/ipratropium for Nebulization 3 milliLiter(s) Nebulizer every 6 hours  fluticasone propionate/ salmeterol 250-50 MICROgram(s) Diskus 1 Dose(s) Inhalation two times a day  guaiFENesin Oral Liquid (Sugar-Free) 100 milliGRAM(s) Oral every 6 hours PRN    acetaminophen     Tablet .. 650 milliGRAM(s) Oral every 6 hours PRN  gabapentin 100 milliGRAM(s) Oral two times a day  melatonin 3 milliGRAM(s) Oral at bedtime PRN  promethazine 25 milliGRAM(s) Oral every 6 hours PRN  traMADol 50 milliGRAM(s) Oral every 8 hours PRN    bismuth subsalicylate Liquid 15 milliLiter(s) Oral every 8 hours PRN  pantoprazole    Tablet 40 milliGRAM(s) Oral before breakfast  polyethylene glycol 3350 17 Gram(s) Oral daily  senna 2 Tablet(s) Oral at bedtime    rosuvastatin 40 milliGRAM(s) Oral at bedtime    benzocaine/menthol Lozenge 1 Lozenge Oral two times a day  clotrimazole 1% Vaginal Cream 1 Applicatorful Vaginal at bedtime  lidocaine   4% Patch 1 Patch Transdermal daily      PAST MEDICAL/SURGICAL HISTORY  PAST MEDICAL & SURGICAL HISTORY:  HTN (hypertension)      COPD (chronic obstructive pulmonary disease)      HLD (hyperlipidemia)      S/P TAVR (transcatheter aortic valve replacement)          SOCIAL HISTORY: Substance Use (street drugs): ( x ) never used  (  ) other:    FAMILY HISTORY:        PHYSICAL EXAM:  T(C): 36.6 (04-09-25 @ 09:52), Max: 36.9 (04-09-25 @ 06:00)  HR: 78 (04-09-25 @ 09:52) (78 - 90)  BP: 136/53 (04-09-25 @ 09:52) (101/54 - 136/53)  RR: 16 (04-09-25 @ 09:52) (16 - 18)  SpO2: 100% (04-09-25 @ 09:52) (94% - 100%)  Wt(kg): --  I&O's Summary    08 Apr 2025 07:01  -  09 Apr 2025 07:00  --------------------------------------------------------  IN: 1100 mL / OUT: 1500 mL / NET: -400 mL    09 Apr 2025 07:01  -  09 Apr 2025 12:51  --------------------------------------------------------  IN: 200 mL / OUT: 450 mL / NET: -250 mL      GENERAL: NAD  EYES:  PERRLA  Cardiovascular: Normal S1 S2, No JVD, No murmurs, No edema  Respiratory: Lungs clear to auscultation	  Gastrointestinal:  Soft, Non-tender, + BS	  Extremities: Normal range of motion, No clubbing, cyanosis or edema  NERVOUS SYSTEM:  Alert & Oriented X3                                8.2    19.62 )-----------( 365      ( 08 Apr 2025 15:35 )             27.4     04-08    138  |  100  |  22  ----------------------------<  113[H]  4.2   |  25  |  1.08    Ca    7.7[L]      08 Apr 2025 06:26  Phos  5.5     04-07  Mg     2.00     04-07    TPro  5.8[L]  /  Alb  2.9[L]  /  TBili  0.3  /  DBili  x   /  AST  10  /  ALT  13  /  AlkPhos  60  04-08    proBNP:   Lipid Profile:   HgA1c:   TSH:     Consultant(s) Notes Reviewed:  [x ] YES  [ ] NO    Care Discussed with Consultants/Other Providers [ x] YES  [ ] NO    Imaging Personally Reviewed independently:  [x] YES  [ ] NO    All labs, radiologic studies, vitals, orders and medications list reviewed. Patient is seen and examined at bedside. Case discussed with medical team.

## 2025-04-09 NOTE — PROGRESS NOTE ADULT - SUBJECTIVE AND OBJECTIVE BOX
Patient is a 84y old  Female who was transferred to [facility] for bronchoscopy with interventional pulmonology.    f/u fever    Interval History/ROS:  no fever.  some wheezing and cough.  no n/v/d.  no abdominal pain.  no dysuria.  Remainder of ROS otherwise negative.    PAST MEDICAL & SURGICAL HISTORY:  HTN (hypertension)  COPD (chronic obstructive pulmonary disease)  HLD (hyperlipidemia)  S/P TAVR (transcatheter aortic valve replacement)    Allergies  tetracycline (Rash; Urticaria)    ANTIMICROBIALS:  vancomycin  IVPB (4/7 x1)  fluconAZOLE   Tablet (4/8 x1)    active:  piperacillin/tazobactam IVPB.. 3.375 every 8 hours (4/4-)    MEDICATIONS  (STANDING):  albuterol/ipratropium for Nebulization 3 every 6 hours  fluticasone propionate/ salmeterol 250-50 MICROgram(s) Diskus 1 two times a day  gabapentin 100 two times a day  losartan 50 daily  pantoprazole    Tablet 40 before breakfast  polyethylene glycol 3350 17 daily  rosuvastatin 40 at bedtime  senna 2 at bedtime    Vital Signs Last 24 Hrs  T(F): 98.4 (04-09-25 @ 06:00), Max: 98.4 (04-09-25 @ 06:00)  HR: 81 (04-09-25 @ 06:00)  BP: 120/50 (04-09-25 @ 06:00)  RR: 16 (04-09-25 @ 06:00)  SpO2: 100% (04-09-25 @ 06:00) (94% - 100%)  Wt(kg): --    PHYSICAL EXAM:  Constitutional: non-toxic  HEAD/EYES: anicteric  ENT:  supple  Cardiovascular:   normal S1, S2  Respiratory:  b/l wheezes, no rales  GI:  soft, non-tender, normal bowel sounds  :  no otoole  Musculoskeletal:  no synovitis  Neurologic: awake and alert, normal strength, no focal findings  Skin:  no rash   Psychiatric:  awake, alert, appropriate mood                        8.2    19.62 )-----------( 365      ( 08 Apr 2025 15:35 )             27.4 04-08    138  |  100  |  22  ----------------------------<  113  4.2   |  25  |  1.08  Ca    7.7      08 Apr 2025 06:26Phos  5.5     04-07Mg     2.00     04-07  TPro  5.8  /  Alb  2.9  /  TBili  0.3  /  DBili  x   /  AST  10  /  ALT  13  /  AlkPhos  60  04-08    WBC Count: 19.62 (04-08-25 @ 15:35)  WBC Count: 16.62 (04-08-25 @ 06:26)  WBC Count: 26.13 (04-07-25 @ 06:05)  WBC Count: 15.93 (04-06-25 @ 07:31)  WBC Count: 16.67 (04-05-25 @ 06:00)  WBC Count: 18.86 (04-04-25 @ 06:29)  WBC Count: 15.93 (04-03-25 @ 06:46)  WBC Count: 22.07 (04-02-25 @ 09:22)  WBC Count: 39.81 (04-01-25 @ 08:08)  WBC Count: 45.57 (03-31-25 @ 22:18)  WBC Count: 65.49 (03-31-25 @ 17:57)    Urinalysis Basic - ( 08 Apr 2025 06:26 )  Urinalysis (04.07.25 @ 06:12)   pH Urine: 5.5  Glucose Qualitative, Urine: Negative mg/dL  Blood, Urine: Trace  Color: Dark Yellow  Urine Appearance: Turbid  Bilirubin: Small  Ketone - Urine: Negative mg/dL  Specific Gravity: 1.030  Protein, Urine: 30 mg/dL  Urobilinogen: 1.0 mg/dL  Nitrite: Negative  Leukocyte Esterase Concentration: Moderate    MICROBIOLOGY:  Culture - Acid Fast - Tissue w/Smear (collected 04-07-25 @ 18:22)  Source: Tissue LEFT LOWER LOBE FORCEP BIO    Culture - Blood (collected 04-07-25 @ 18:10)  Source: Blood Blood-Peripheral  Preliminary Report (04-08-25 @ 22:02):    No growth at 24 hours    Culture - Tissue with Gram Stain (collected 04-07-25 @ 17:00)  Source: Tissue LEFT LOWER LOBE FORCEP BIOPSYe  Gram Stain (04-08-25 @ 01:37):    No polymorphonuclear cells seen per low power field    No organisms seen per oil power field  Preliminary Report (04-08-25 @ 20:10):    No growth to date.    Culture - Fungal, Tissue (collected 04-07-25 @ 17:00)  Source: Tissue LEFT LOWER LOBE FORCEP BIO  Preliminary Report (04-08-25 @ 08:02):    Testing in progress    Culture - Acid Fast - Bronchial w/Smear (collected 04-07-25 @ 15:39)  Source: Bronchial BAL LLL    Culture - Fungal, Bronchial (collected 04-07-25 @ 15:39)  Source: Bronchial BAL LLL  Preliminary Report (04-09-25 @ 06:47):    No growth    Culture - Bronchial (collected 04-07-25 @ 15:39)  Source: Bronchial BAL LLL  Gram Stain (04-07-25 @ 22:51):    Moderate polymorphonuclear leukocytes per low power field    No squamous epithelial cells per low power field    No organisms seen per oil power field  Final Report (04-09-25 @ 06:43):    No growth    Culture - Blood (collected 04-07-25 @ 07:05)  Source: Blood Blood-Peripheral  Preliminary Report (04-09-25 @ 09:02):    No growth at 48 Hours    Culture - Blood (collected 04-07-25 @ 06:10)  Source: Blood Blood-Peripheral  Preliminary Report (04-09-25 @ 09:02):    No growth at 48 Hours    CMV IgG Antibody: <0.20 U/mL (04-03-25 @ 06:45)  Quantiferon is indeterminant    RADIOLOGY:  below radiology personally reviewed and agree with finding      Xray Chest 1 View- PORTABLE-Urgent (Xray Chest 1 View- PORTABLE-Urgent .) (04.07.25 @ 16:37) >  IMPRESSION:  New left basilar hazy opacity, which may represent pleural effusion and/or atelectasis.  Redemonstrated left retrocardiac nodular opacity, better appreciated CT chest 4/1/2025.     Xray Wrist 3 Views, Left (04.03.25 @ 13:55) >  IMPRESSION:  No fractures or dislocations.  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 Preserved remaining joint spaces andno joint margin erosions.  Carpal bones normally aligned.  Neutral ulnar variance.  Generalized osteopenia otherwise no discrete lytic or blastic lesions.    CT Chest w/ IV Cont (04.01.25 @ 18:15) >  IMPRESSION:  2.4 x 2.4 cm irregular noncalcified masslike density medial aspect of the left lower lobe abutting the pleura which is new since October 2019. Presumed neoplastic etiology until proven otherwise. PET/CT correlation recommended for further evaluation.  2 cm left adrenal nodule previously measured 1.2 cm October 2019 with attenuation values at that time consistent with adenoma.  Spleen is enlarged measuring 15.5 cm. and increased in size from prior exam October 2019. No focal defects.  Scattered subcentimeter periaortic lymph nodes. Small bilateral external iliac lymph nodes measuring up to 1.1 x 0.9 cm.      Xray Chest 1 View- PORTABLE-Urgent (03.31.25 @ 19:26) >  IMPRESSION:  Left retrocardiac nodular opacity, better appreciated in CT of the chest. Follow-up dedicated CT chest report.    CT Chest No Cont (03.31.25 @ 19:20) >  IMPRESSION:  Left lower lobe 2.4 cm nodule new since October 2, 2019 concerning for neoplasm, possibly primary lung neoplasm.  Enlargement of the partially imaged spleen with interval increase in size since 2019.    CT Lumbar Spine No Cont (03.31.25 @ 19:20) >  IMPRESSION:  Age-indeterminate S1 superior endplate fracture. No prior study available for comparison at this time. No traumatic malalignment.  Multilevel degenerative changes, as above.  Sigmoid colon diverticulosis.

## 2025-04-09 NOTE — PROGRESS NOTE ADULT - PROBLEM SELECTOR PLAN 4
WBC 26 today  (was 65 on admission)  - infectious workup unrevealing - BCx/UA/CXR neg, QuantiFERON-TB Gold, fungal and viral serology pending  - ID evaluated at [facility] zosyn x 7 days, rec flow cytometry, AFB, bacterial and fungal cx to be sent with biopsy  -worsening leucocytosis yesterday , improved today to 16 , per daughter , her baseline is 14-15  - pt with  EBV IGM+, will discuss with ID if concern for acute infection  -ID Eval requested, will f/u recs  - trend WBC pt  noted on intraoperative telemetry with abnormal ST segments,   pt reports  CP with coughing    cardiology eval appreciated    will check TTE

## 2025-04-09 NOTE — PROGRESS NOTE ADULT - PROBLEM SELECTOR PLAN 3
Anemia profile noted from 4/2/25 , low serum iron and TIBC, consistent with AOCD with iron deficiency   Hb 8.9-->7.2 , maybe dilutional as pt received IVF  will repeat cbc    will transfuse for HB < 7 Anemia profile noted from 4/2/25 , low serum iron and TIBC, consistent with AOCD with iron deficiency   Hb 8.9-->7.2 , maybe dilutional as pt received IVF   repeat cbc with  hb 8.2 , will CTM    will transfuse for HB < 7

## 2025-04-10 DIAGNOSIS — C34.32 MALIGNANT NEOPLASM OF LOWER LOBE, LEFT BRONCHUS OR LUNG: ICD-10-CM

## 2025-04-10 LAB
ANION GAP SERPL CALC-SCNC: 10 MMOL/L — SIGNIFICANT CHANGE UP (ref 7–14)
BASOPHILS # BLD AUTO: 0.16 K/UL — SIGNIFICANT CHANGE UP (ref 0–0.2)
BASOPHILS NFR BLD AUTO: 1 % — SIGNIFICANT CHANGE UP (ref 0–2)
BUN SERPL-MCNC: 13 MG/DL — SIGNIFICANT CHANGE UP (ref 7–23)
CALCIUM SERPL-MCNC: 8.5 MG/DL — SIGNIFICANT CHANGE UP (ref 8.4–10.5)
CHLORIDE SERPL-SCNC: 106 MMOL/L — SIGNIFICANT CHANGE UP (ref 98–107)
CO2 SERPL-SCNC: 28 MMOL/L — SIGNIFICANT CHANGE UP (ref 22–31)
CREAT SERPL-MCNC: 0.78 MG/DL — SIGNIFICANT CHANGE UP (ref 0.5–1.3)
EGFR: 75 ML/MIN/1.73M2 — SIGNIFICANT CHANGE UP
EGFR: 75 ML/MIN/1.73M2 — SIGNIFICANT CHANGE UP
EOSINOPHIL # BLD AUTO: 0.21 K/UL — SIGNIFICANT CHANGE UP (ref 0–0.5)
EOSINOPHIL NFR BLD AUTO: 1.3 % — SIGNIFICANT CHANGE UP (ref 0–6)
GLUCOSE SERPL-MCNC: 89 MG/DL — SIGNIFICANT CHANGE UP (ref 70–99)
HCT VFR BLD CALC: 28.4 % — LOW (ref 34.5–45)
HGB BLD-MCNC: 8.1 G/DL — LOW (ref 11.5–15.5)
IANC: 9.54 K/UL — HIGH (ref 1.8–7.4)
IMM GRANULOCYTES NFR BLD AUTO: 11.7 % — HIGH (ref 0–0.9)
LYMPHOCYTES # BLD AUTO: 1.31 K/UL — SIGNIFICANT CHANGE UP (ref 1–3.3)
LYMPHOCYTES # BLD AUTO: 8 % — LOW (ref 13–44)
MAGNESIUM SERPL-MCNC: 2.3 MG/DL — SIGNIFICANT CHANGE UP (ref 1.6–2.6)
MCHC RBC-ENTMCNC: 16.7 PG — LOW (ref 27–34)
MCHC RBC-ENTMCNC: 28.5 G/DL — LOW (ref 32–36)
MCV RBC AUTO: 58.4 FL — LOW (ref 80–100)
MONOCYTES # BLD AUTO: 3.15 K/UL — HIGH (ref 0–0.9)
MONOCYTES NFR BLD AUTO: 19.3 % — HIGH (ref 2–14)
NEUTROPHILS # BLD AUTO: 9.54 K/UL — HIGH (ref 1.8–7.4)
NEUTROPHILS NFR BLD AUTO: 58.7 % — SIGNIFICANT CHANGE UP (ref 43–77)
NON-GYNECOLOGICAL CYTOLOGY STUDY: SIGNIFICANT CHANGE UP
NRBC # BLD AUTO: 0 K/UL — SIGNIFICANT CHANGE UP (ref 0–0)
NRBC # FLD: 0 K/UL — SIGNIFICANT CHANGE UP (ref 0–0)
NRBC BLD AUTO-RTO: 0 /100 WBCS — SIGNIFICANT CHANGE UP (ref 0–0)
PHOSPHATE SERPL-MCNC: 3.8 MG/DL — SIGNIFICANT CHANGE UP (ref 2.5–4.5)
PLATELET # BLD AUTO: 452 K/UL — HIGH (ref 150–400)
POTASSIUM SERPL-MCNC: 4 MMOL/L — SIGNIFICANT CHANGE UP (ref 3.5–5.3)
POTASSIUM SERPL-SCNC: 4 MMOL/L — SIGNIFICANT CHANGE UP (ref 3.5–5.3)
RBC # BLD: 4.86 M/UL — SIGNIFICANT CHANGE UP (ref 3.8–5.2)
RBC # FLD: 21.3 % — HIGH (ref 10.3–14.5)
SODIUM SERPL-SCNC: 144 MMOL/L — SIGNIFICANT CHANGE UP (ref 135–145)
WBC # BLD: 16.28 K/UL — HIGH (ref 3.8–10.5)
WBC # FLD AUTO: 16.28 K/UL — HIGH (ref 3.8–10.5)

## 2025-04-10 PROCEDURE — G0545: CPT

## 2025-04-10 PROCEDURE — 99232 SBSQ HOSP IP/OBS MODERATE 35: CPT

## 2025-04-10 PROCEDURE — 99233 SBSQ HOSP IP/OBS HIGH 50: CPT | Mod: GC

## 2025-04-10 PROCEDURE — 99231 SBSQ HOSP IP/OBS SF/LOW 25: CPT

## 2025-04-10 RX ORDER — ENOXAPARIN SODIUM 100 MG/ML
40 INJECTION SUBCUTANEOUS EVERY 12 HOURS
Refills: 0 | Status: DISCONTINUED | OUTPATIENT
Start: 2025-04-10 | End: 2025-04-21

## 2025-04-10 RX ORDER — DEXTROMETHORPHAN HBR, GUAIFENESIN 200 MG/10ML
100 LIQUID ORAL EVERY 6 HOURS
Refills: 0 | Status: DISCONTINUED | OUTPATIENT
Start: 2025-04-10 | End: 2025-04-11

## 2025-04-10 RX ADMIN — DEXTROMETHORPHAN HBR, GUAIFENESIN 100 MILLIGRAM(S): 200 LIQUID ORAL at 11:09

## 2025-04-10 RX ADMIN — GABAPENTIN 100 MILLIGRAM(S): 400 CAPSULE ORAL at 17:40

## 2025-04-10 RX ADMIN — Medication 1 DOSE(S): at 23:07

## 2025-04-10 RX ADMIN — ROSUVASTATIN CALCIUM 40 MILLIGRAM(S): 20 TABLET, FILM COATED ORAL at 23:08

## 2025-04-10 RX ADMIN — GABAPENTIN 100 MILLIGRAM(S): 400 CAPSULE ORAL at 06:32

## 2025-04-10 RX ADMIN — DEXTROMETHORPHAN HBR, GUAIFENESIN 100 MILLIGRAM(S): 200 LIQUID ORAL at 23:09

## 2025-04-10 RX ADMIN — Medication 2 TABLET(S): at 23:08

## 2025-04-10 RX ADMIN — Medication 40 MILLIGRAM(S): at 06:32

## 2025-04-10 RX ADMIN — DEXTROMETHORPHAN HBR, GUAIFENESIN 100 MILLIGRAM(S): 200 LIQUID ORAL at 17:41

## 2025-04-10 RX ADMIN — LOSARTAN POTASSIUM 50 MILLIGRAM(S): 100 TABLET, FILM COATED ORAL at 06:32

## 2025-04-10 RX ADMIN — Medication 1 LOZENGE: at 06:33

## 2025-04-10 RX ADMIN — CLOTRIMAZOLE 1 APPLICATORFUL: 1 CREAM TOPICAL at 23:09

## 2025-04-10 RX ADMIN — IPRATROPIUM BROMIDE AND ALBUTEROL SULFATE 3 MILLILITER(S): .5; 2.5 SOLUTION RESPIRATORY (INHALATION) at 18:02

## 2025-04-10 NOTE — PROGRESS NOTE ADULT - ASSESSMENT
84F with hx of TAVR (2019), COPD (on PRN O2), HLD, S1 Fracture (02/2025 no surgical intervention) initially admitted to [facility] 3/31-4/4 for fever, found to have new LL mass like consolidation for which IP was consulted    #LLL nodule  s/p navigational bronchoscopy 4/7/25 with transbronchial biopsy of the LLL, EBUS with lymph node biopsies and BAL  Biopsy of nodule resulted malignant cells  No malignant cells seen in 11L or station 7 lymph nodes, indeterminant in 11R  - please consult oncology inpatient with further workup to be pursued with their aid  - outpatient PET and MRI brain  - outpatient f/u with Dr. Contreras when ready for discharge, can email [email or web address] to arrange  - f/u outstanding bronchoscopy studies including stains on path

## 2025-04-10 NOTE — PROGRESS NOTE ADULT - ASSESSMENT
EKG: SR ST abn +delta wave  Echo < from: TTE W or WO Ultrasound Enhancing Agent (04.09.25 @ 15:12) >   1. Left ventricular cavity is normal in size. Left ventricular systolic function is normal with an ejection fraction of 67 % by Yan's method of disks.   2. There is mild (grade 1) left ventricular diastolic dysfunction.   3. Normal right ventricular cavity size and normal right ventricular systolic function.   4. Left atrium is normal in size.   5. The right atrium is dilated.   6. A a transcatheter deployed (TAVR) is present in theaortic position. The peak transaortic velocity is 2.07 m/s, peak transaortic gradient is 17.1 mmHg and mean transaortic gradient is 10.0 mmHg with an LVOT/aortic valve VTI ratio of 0.66. There is no regurgitation.   7. No pericardial effusion seen.  8. Estimated pulmonary artery systolic pressure is 48 mmHg.   9. The inferior vena cava is normal in size measuring 2.02 cm in diameter, (normal <2.1cm) with abnormal inspiratory collapse (abnormal <50%) consistent with mildly elevated right atrial pressure (~8, range 5-10mmHg).    < end of copied text >      A/P:  84F with hx of TAVR (2019), COPD (on PRN O2), HLD, S1 Fracture (02/2025 no surgical intervention) initially admitted to [facility] 3/31-4/4 for fever, found to have new LLL mass like consolidation, c/f malignancy, infectious workup negative, transferred to [facility] for bronchoscopy with interventional pulmonology. S/p bronchoscopy 4/7. Cardiology consult called for intra-op ST changes on tele.    1. ST changes   - has c/o chest pain with cough  - repeat EKG   - echo normal     2. lung mass  - s/p bronchoscopy 4/7/25 with biopsy of the LLL, EBUS with lymph node biopsy  - f/u interventional pulm    3. hx AS s/p TAVR   - has been asymptomatic    4. HTN  - c/w losartan

## 2025-04-10 NOTE — CHART NOTE - NSCHARTNOTEFT_GEN_A_CORE
NUTRITION FOLLOW UP NOTE    Pt seen for nutrition follow-up note.    SOURCE: [X] Patient [X] Medical record [X] RN/PCA [X] Family/Caregiver (daughter at bedside)    Medical Course: Per chart review, 83 y/o F with hx of TAVR (2019), COPD (on PRN O2), HLD, S1 Fracture (02/2025 no surgical intervention) initially admitted to [facility] 3/31-4/4 for fever, found to have new LLL mass like consolidation, c/f malignancy, infectious workup negative, transferred to [facility] for bronchoscopy with interventional pulmonology.      Diet Prescription: Diet, DASH/TLC:   Sodium & Cholesterol Restricted (04-04-25 @ 15:30)      Food Allergy/Intolerance: Does not like fish    Nutrition Course: Met with patient and daughter at bedside. Reports patient ate better today for lunch. Consuming >75%. Per nursing flowsheets, 51-75% po intake reported. Patient denies any nausea/vomiting/diarrhea/constipation or difficulty chewing and swallowing. Last bowel movement on 4/7. Per daughter, patient does not like fish, does not want fish available. RD implemented same. Continue good po intake of nutrient and protein dense foods encouraged.       Pertinent Medications: MEDICATIONS  (STANDING):  albuterol/ipratropium for Nebulization 3 milliLiter(s) Nebulizer every 6 hours  benzocaine/menthol Lozenge 1 Lozenge Oral two times a day  clotrimazole 1% Vaginal Cream 1 Applicatorful Vaginal at bedtime  fluticasone propionate/ salmeterol 250-50 MICROgram(s) Diskus 1 Dose(s) Inhalation two times a day  gabapentin 100 milliGRAM(s) Oral two times a day  lidocaine   4% Patch 1 Patch Transdermal daily  losartan 50 milliGRAM(s) Oral daily  pantoprazole    Tablet 40 milliGRAM(s) Oral before breakfast  polyethylene glycol 3350 17 Gram(s) Oral daily  rosuvastatin 40 milliGRAM(s) Oral at bedtime  senna 2 Tablet(s) Oral at bedtime    MEDICATIONS  (PRN):  acetaminophen     Tablet .. 650 milliGRAM(s) Oral every 6 hours PRN Temp greater or equal to 38C (100.4F), Mild Pain (1 - 3), Moderate Pain (4 - 6)  bismuth subsalicylate Liquid 15 milliLiter(s) Oral every 8 hours PRN ingestion  guaiFENesin Oral Liquid (Sugar-Free) 100 milliGRAM(s) Oral every 6 hours PRN Cough  melatonin 3 milliGRAM(s) Oral at bedtime PRN Insomnia  promethazine 25 milliGRAM(s) Oral every 6 hours PRN allergies  traMADol 50 milliGRAM(s) Oral every 8 hours PRN Severe Pain (7 - 10)        Pertinent Labs: 04-10 Na144 mmol/L Glu 89 mg/dL K+ 4.0 mmol/L Cr  0.78 mg/dL BUN 13 mg/dL 04-10 Phos 3.8 mg/dL 04-08 Alb 2.9 g/dL[L]       Weight (kg): 95 (04-07 @ 17:00) 95.1kg (04-07)   Weight Assessment: No new weight to assess at this time.  Height: 65in   IBW: 125lbs / 56.6kg +/-10%  BMI: 34.9kg/m^2    Physical Assessment, per nursing flowsheets:  Edema: none  Pressure Injury: none      Estimated Needs:   [X] No change since previous assessment, based on IBW-56.6kg   1584-1867kcal daily @ 28-33kcal/kg,  67-79gm protein daily @ 1.2-1.4gm/kg         Previous Nutrition Diagnosis: not applicable    New Nutrition Diagnosis: [X ] not applicable     Education:  [X ]Not applicable-po intake encouraged as above    Interventions:   1) Continue current diet order, which remains appropriate at this time.   2) Monitor weights, labs, BM's, skin integrity, p.o. intake.   3) Honor food and beverage preferences within diet restriction of patient in an effort to maximize level of nutrient intake.  4) Please Encourage po intake, assist with meals and menu selections, provide alternatives PRN.   5) RN to obtain new weight and weekly weights.       Blaine Rai RD, CDN, MS pager [phone]  Also available on Microsoft Teams

## 2025-04-10 NOTE — PROGRESS NOTE ADULT - PROBLEM SELECTOR PLAN 1
LLL noted on imaging  - CT with 2.4cm lung nodule  - IR unable to obtain percutaneous biopsy  - appreciate IP ,s/p navigational bronchoscopy 4/7/25 with transbronchial biopsy of the LLL, EBUS with lymph node biopsies and BAL   post procedure CXR with  no pneumothorax or right pleural effusion.  - BAL negative for inflection so far   - will f/u cytopathology LLL noted on imaging  - CT with 2.4cm lung nodule  - IR unable to obtain percutaneous biopsy  - appreciate IP ,s/p navigational bronchoscopy 4/7/25 with transbronchial biopsy of the LLL, EBUS with lymph node biopsies and BAL   post procedure CXR with  no pneumothorax or right pleural effusion.  - BAL negative for infection so far   - will f/u cytopathology

## 2025-04-10 NOTE — PROGRESS NOTE ADULT - PROBLEM SELECTOR PLAN 5
WBC 26 today  (was 65 on admission)  - infectious workup unrevealing - BCx/UA/CXR neg, QuantiFERON-TB Gold, fungal and viral serology pending  - ID evaluated at [facility] - empric zosyn x 7 days, rec flow cytometry, AFB, bacterial and fungal cx to be sent with biopsy  pt had worsening leucocytosis  , per daughter  her baseline is 14-15  - pt with  EBV IGM+ but no PCR detected ,  -ID f/u appreciated, leucocytosis likely reactive vs 2/2 malignancy  - ID rec to monitor off abx , will dc zosyn as pt completed 7 days of empiric treatment  - trend WBC

## 2025-04-10 NOTE — PROGRESS NOTE ADULT - PROBLEM SELECTOR PLAN 3
Anemia profile noted from 4/2/25 , low serum iron and TIBC, consistent with AOCD with iron deficiency   Hb 8.9-->7.2 , maybe dilutional as pt received IVF   repeat cbc with  hb 8.2 , will CTM    will transfuse for HB < 7

## 2025-04-10 NOTE — PROGRESS NOTE ADULT - PROBLEM SELECTOR PLAN 2
febrile on admission to [facility]  - infectious workup unrevealing - BCx/UA/CXR neg, QuantiFERON-TB Gold, fungal and viral serology pending  - CT chest with LLL lung nodule - management as below, CT CAP without infectious etiology   - ID (Dr. Bui) evaluated at [facility] - rec flow cytometry, AFB, bacterial and fungal cx to be sent with biopsy, , , BAL negative for infection at this time , will f/u cytopathology   -on  Zosyn (planned for empiric 7 day course per ID at [facility]) -   -pt had worsening leucocytosis  , per daughter  her baseline is 14-15  - pt with  EBV IGM+ but no PCR detected ,  -ID f/u appreciated, leucocytosis likely reactive vs 2/2 malignancy  - ID rec to monitor off abx , will dc zosyn as pt completed 7 days of empiric treatment febrile on admission to [facility]  - infectious workup unrevealing - BCx/UA/CXR neg, QuantiFERON-TB Gold, fungal and viral serology pending  - CT chest with LLL lung nodule - management as below, CT CAP without infectious etiology   - ID (Dr. Bui) evaluated at [facility] - rec flow cytometry, AFB, bacterial and fungal cx to be sent with biopsy, , , BAL negative for infection at this time , will f/u cytopathology   -on  Zosyn (planned for empiric 7 day course per ID at [facility]) -   -pt had worsening leucocytosis  ,now improving ,  per daughter  her baseline is 14-15  - pt with  EBV IGM+ but no PCR detected ,  -ID f/u appreciated, leucocytosis likely reactive vs 2/2 malignancy  - ID rec to monitor off abx , Dced zosyn as pt completed 7 days of empiric treatment

## 2025-04-10 NOTE — PROGRESS NOTE ADULT - SUBJECTIVE AND OBJECTIVE BOX
Cong Marcum MD  Interventional Cardiology / Endovascular Specialist  [place] Office : [phone] [place] [address] [place] [address]  Tel:   [place] Office : [address] [facility] [place] [address]  Tel: [phone]    Pt lying in bed in [facility]   	  MEDICATIONS:  losartan 50 milliGRAM(s) Oral daily      albuterol/ipratropium for Nebulization 3 milliLiter(s) Nebulizer every 6 hours  fluticasone propionate/ salmeterol 250-50 MICROgram(s) Diskus 1 Dose(s) Inhalation two times a day  guaiFENesin Oral Liquid (Sugar-Free) 100 milliGRAM(s) Oral every 6 hours PRN    acetaminophen     Tablet .. 650 milliGRAM(s) Oral every 6 hours PRN  gabapentin 100 milliGRAM(s) Oral two times a day  melatonin 3 milliGRAM(s) Oral at bedtime PRN  promethazine 25 milliGRAM(s) Oral every 6 hours PRN  traMADol 50 milliGRAM(s) Oral every 8 hours PRN    bismuth subsalicylate Liquid 15 milliLiter(s) Oral every 8 hours PRN  pantoprazole    Tablet 40 milliGRAM(s) Oral before breakfast  polyethylene glycol 3350 17 Gram(s) Oral daily  senna 2 Tablet(s) Oral at bedtime    rosuvastatin 40 milliGRAM(s) Oral at bedtime    benzocaine/menthol Lozenge 1 Lozenge Oral two times a day  clotrimazole 1% Vaginal Cream 1 Applicatorful Vaginal at bedtime  lidocaine   4% Patch 1 Patch Transdermal daily      PAST MEDICAL/SURGICAL HISTORY  PAST MEDICAL & SURGICAL HISTORY:  HTN (hypertension)      COPD (chronic obstructive pulmonary disease)      HLD (hyperlipidemia)      S/P TAVR (transcatheter aortic valve replacement)          SOCIAL HISTORY: Substance Use (street drugs): ( x ) never used  (  ) other:    FAMILY HISTORY:      REVIEW OF SYSTEMS:  CONSTITUTIONAL: No fever, weight loss, or fatigue  EYES: No eye pain, visual disturbances, or discharge  ENMT:  No difficulty hearing, tinnitus, vertigo; No sinus or throat pain  BREASTS: No pain, masses, or nipple discharge  GASTROINTESTINAL: No abdominal or epigastric pain. No nausea, vomiting, or hematemesis; No diarrhea or constipation. No melena or hematochezia.  GENITOURINARY: No dysuria, frequency, hematuria, or incontinence  NEUROLOGICAL: No headaches, memory loss, loss of strength, numbness, or tremors  ENDOCRINE: No heat or cold intolerance; No hair loss  MUSCULOSKELETAL: No joint pain or swelling; No muscle, back, or extremity pain  PSYCHIATRIC: No depression, anxiety, mood swings, or difficulty sleeping  HEME/LYMPH: No easy bruising, or bleeding gums  All others negative    PHYSICAL EXAM:  T(C): 36.8 (04-10-25 @ 09:51), Max: 36.8 (04-09-25 @ 18:00)  HR: 86 (04-10-25 @ 09:51) (82 - 88)  BP: 110/51 (04-10-25 @ 09:51) (110/51 - 139/46)  RR: 18 (04-10-25 @ 09:51) (16 - 18)  SpO2: 100% (04-10-25 @ 09:51) (99% - 100%)  Wt(kg): --  I&O's Summary    09 Apr 2025 07:01  -  10 Apr 2025 07:00  --------------------------------------------------------  IN: 990 mL / OUT: 1600 mL / NET: -610 mL        GENERAL: NAD  EYES:  PERRLA  Cardiovascular: Normal S1 S2, No JVD, No murmurs, No edema  Respiratory: Lungs clear to auscultation	  Gastrointestinal:  Soft, Non-tender, + BS	  Extremities: Normal range of motion, No clubbing, cyanosis or edema  NERVOUS SYSTEM:  Alert & Oriented X3                          8.1    16.28 )-----------( 452      ( 10 Apr 2025 08:23 )             28.4     04-10    144  |  106  |  13  ----------------------------<  89  4.0   |  28  |  0.78    Ca    8.5      10 Apr 2025 08:23  Phos  3.8     04-10  Mg     2.30     04-10      proBNP:   Lipid Profile:   HgA1c:   TSH:     Consultant(s) Notes Reviewed:  [x ] YES  [ ] NO    Care Discussed with Consultants/Other Providers [ x] YES  [ ] NO    Imaging Personally Reviewed independently:  [x] YES  [ ] NO    All labs, radiologic studies, vitals, orders and medications list reviewed. Patient is seen and examined at bedside. Case discussed with medical team.

## 2025-04-10 NOTE — PROGRESS NOTE ADULT - PROBLEM SELECTOR PLAN 7
pt was noted to be hypotensive 4/7, , now resolved   - on  losartan 50 mg to be held for SBP<110   - monitor vital signs

## 2025-04-10 NOTE — PROGRESS NOTE ADULT - PROBLEM SELECTOR PLAN 9
DVT ppx: lovenox  DIET: DASH   DISPO: pending clinical course   plan of care d/w pt at bedside    case d/w daughter on the phone , update provided   case d/w ACP DVT ppx: lovenox  DIET: DASH   DISPO: pending clinical course   plan of care d/w pt at bedside    4/9 - case d/w daughter on the phone , update provided   case d/w ACP and SW

## 2025-04-10 NOTE — PROGRESS NOTE ADULT - ASSESSMENT
84F with hx of TAVR (2019), COPD (on PRN O2), HLD, S1 Fracture (02/2025 no surgical intervention) initially admitted to [facility] 3/31-4/4 for fever, found to have new LLL mass like consolidation, c/f malignancy, infectious workup negative, transferred to [facility] for bronchoscopy with interventional pulmonology.

## 2025-04-10 NOTE — PROGRESS NOTE ADULT - PROBLEM SELECTOR PLAN 6
respiratory status stable  - continue advair BID, duoneb q6h respiratory status stable  - continue advair BID, duoneb q6h   will change cough meds to ATC as pt not asking for it

## 2025-04-10 NOTE — PROGRESS NOTE ADULT - PROBLEM SELECTOR PLAN 4
10/09/23 1:07 PM    Patient contacted post ED visit, [facility] department spoke with patient/caregiver and outreach was successful. Thank you.   Martha Borja MA  [facility] VALUE BASED VIR pt  noted on intraoperative telemetry with abnormal ST segments,   pt reports  CP with coughing    cardiology eval appreciated    will check TTE pt  noted on intraoperative telemetry with abnormal ST segments,   pt reports  CP with coughing , unlikely cardiac as pain reproducible    cardiology eval appreciated   TTE noted as above, . Left ventricular cavity is normal in size. Left ventricular systolic function is normal with an ejection fraction of 67 % ,  There is mild (grade 1) left ventricular diastolic dysfunction.  . Normal right ventricular cavity size and normal right ventricular systolic functio

## 2025-04-10 NOTE — PROGRESS NOTE ADULT - SUBJECTIVE AND OBJECTIVE BOX
CHIEF COMPLAINT:Patient is a 84y old  Female who presents with a chief complaint of transfer from [facility] (09 Apr 2025 09:34)      Interval Events:      REVIEW OF SYSTEMS:  [x] All other systems negative except per HPI   [ ] Unable to assess ROS because ________    OBJECTIVE:  ICU Vital Signs Last 24 Hrs  T(C): 36.8 (10 Apr 2025 09:51), Max: 36.8 (09 Apr 2025 18:00)  T(F): 98.2 (10 Apr 2025 09:51), Max: 98.2 (09 Apr 2025 18:00)  HR: 86 (10 Apr 2025 09:51) (82 - 88)  BP: 110/51 (10 Apr 2025 09:51) (110/51 - 139/46)  BP(mean): --  ABP: --  ABP(mean): --  RR: 18 (10 Apr 2025 09:51) (16 - 18)  SpO2: 100% (10 Apr 2025 09:51) (99% - 100%)    O2 Parameters below as of 10 Apr 2025 06:15  Patient On (Oxygen Delivery Method): nasal cannula  O2 Flow (L/min): 3            04-09 @ 07:01  -  04-10 @ 07:00  --------------------------------------------------------  IN: 990 mL / OUT: 1600 mL / NET: -610 mL        PHYSICAL EXAM:  GENERAL: NAD, well-groomed, well-developed  HEAD:  Atraumatic, Normocephalic  EYES: EOMI, PERRLA, conjunctiva and sclera clear  ENMT: No tonsillar erythema, exudates, or enlargement; Moist mucous membranes, Good dentition, No lesions  NECK: Supple, No JVD, Normal thyroid  CHEST/LUNG: Clear to auscultation bilaterally; No rales, rhonchi, wheezing, or rubs  HEART: Regular rate and rhythm; No murmurs, rubs, or gallops  ABDOMEN: Soft, Nontender, Nondistended; Bowel sounds present  VASCULAR:  2+ Peripheral Pulses, No clubbing, cyanosis, or edema  LYMPH: No lymphadenopathy noted  SKIN: No rashes or lesions  NERVOUS SYSTEM:  Alert & Oriented X3, Good concentration; Motor Strength 5/5 B/L upper and lower extremities; DTRs 2+ intact and symmetric    HOSPITAL MEDICATIONS:  MEDICATIONS  (STANDING):  albuterol/ipratropium for Nebulization 3 milliLiter(s) Nebulizer every 6 hours  benzocaine/menthol Lozenge 1 Lozenge Oral two times a day  clotrimazole 1% Vaginal Cream 1 Applicatorful Vaginal at bedtime  enoxaparin Injectable 40 milliGRAM(s) SubCutaneous every 12 hours  fluticasone propionate/ salmeterol 250-50 MICROgram(s) Diskus 1 Dose(s) Inhalation two times a day  gabapentin 100 milliGRAM(s) Oral two times a day  guaiFENesin Oral Liquid (Sugar-Free) 100 milliGRAM(s) Oral every 6 hours  lidocaine   4% Patch 1 Patch Transdermal daily  losartan 50 milliGRAM(s) Oral daily  pantoprazole    Tablet 40 milliGRAM(s) Oral before breakfast  polyethylene glycol 3350 17 Gram(s) Oral daily  rosuvastatin 40 milliGRAM(s) Oral at bedtime  senna 2 Tablet(s) Oral at bedtime    MEDICATIONS  (PRN):  acetaminophen     Tablet .. 650 milliGRAM(s) Oral every 6 hours PRN Temp greater or equal to 38C (100.4F), Mild Pain (1 - 3), Moderate Pain (4 - 6)  bismuth subsalicylate Liquid 15 milliLiter(s) Oral every 8 hours PRN ingestion  melatonin 3 milliGRAM(s) Oral at bedtime PRN Insomnia  promethazine 25 milliGRAM(s) Oral every 6 hours PRN allergies  traMADol 50 milliGRAM(s) Oral every 8 hours PRN Severe Pain (7 - 10)      LABS:    The Labs were reviewed by me   The Radiology was reviewed by me    EKG tracing reviewed by me    04-10    144  |  106  |  13  ----------------------------<  89  4.0   |  28  |  0.78  04-09    141  |  105  |  18  ----------------------------<  100[H]  4.0   |  25  |  0.94  04-08    138  |  100  |  22  ----------------------------<  113[H]  4.2   |  25  |  1.08    Ca    8.5      10 Apr 2025 08:23  Ca    8.4      09 Apr 2025 13:40  Ca    7.7[L]      08 Apr 2025 06:26  Phos  3.8     04-10  Mg     2.30     04-10    TPro  5.8[L]  /  Alb  2.9[L]  /  TBili  0.3  /  DBili  x   /  AST  10  /  ALT  13  /  AlkPhos  60  04-08  TPro  6.0  /  Alb  2.8[L]  /  TBili  0.4  /  DBili  x   /  AST  12  /  ALT  13  /  AlkPhos  70  04-07    Magnesium: 2.30 mg/dL (04-10-25 @ 08:23)  Magnesium: 2.30 mg/dL (04-09-25 @ 13:40)  Magnesium: 2.00 mg/dL (04-07-25 @ 18:56)    Phosphorus: 3.8 mg/dL (04-10-25 @ 08:23)  Phosphorus: 4.0 mg/dL (04-09-25 @ 13:40)  Phosphorus: 5.5 mg/dL (04-07-25 @ 18:56)                    Urinalysis Basic - ( 10 Apr 2025 08:23 )    Color: x / Appearance: x / SG: x / pH: x  Gluc: 89 mg/dL / Ketone: x  / Bili: x / Urobili: x   Blood: x / Protein: x / Nitrite: x   Leuk Esterase: x / RBC: x / WBC x   Sq Epi: x / Non Sq Epi: x / Bacteria: x                              8.1    16.28 )-----------( 452      ( 10 Apr 2025 08:23 )             28.4                         8.6    19.31 )-----------( 434      ( 09 Apr 2025 13:40 )             28.7                         8.2    19.62 )-----------( 365      ( 08 Apr 2025 15:35 )             27.4     CAPILLARY BLOOD GLUCOSE            MICROBIOLOGY:     RADIOLOGY:  [ ] Reviewed and interpreted by me    Point of Care Ultrasound Findings:    PFT:    EKG: CHIEF COMPLAINT:Patient is a 84y old  Female who presents with a chief complaint of transfer from [facility] (09 Apr 2025 09:34)      Interval Events:  s/p bronchoscopy on 4/7, +for malignancy in LLL    REVIEW OF SYSTEMS:  [x] All other systems negative except per HPI   [ ] Unable to assess ROS because ________    OBJECTIVE:  ICU Vital Signs Last 24 Hrs  T(C): 36.8 (10 Apr 2025 09:51), Max: 36.8 (09 Apr 2025 18:00)  T(F): 98.2 (10 Apr 2025 09:51), Max: 98.2 (09 Apr 2025 18:00)  HR: 86 (10 Apr 2025 09:51) (82 - 88)  BP: 110/51 (10 Apr 2025 09:51) (110/51 - 139/46)  BP(mean): --  ABP: --  ABP(mean): --  RR: 18 (10 Apr 2025 09:51) (16 - 18)  SpO2: 100% (10 Apr 2025 09:51) (99% - 100%)    O2 Parameters below as of 10 Apr 2025 06:15  Patient On (Oxygen Delivery Method): nasal cannula  O2 Flow (L/min): 3            04-09 @ 07:01  -  04-10 @ 07:00  --------------------------------------------------------  IN: 990 mL / OUT: 1600 mL / NET: -610 mL        PHYSICAL EXAM:  Gen: NAD, WD, WN  HEENT: MMM, PERRL, EOMI  Neck: No JVP elevation  CV: RRR, no m/r/g  Lung: CTAB  Abd: Soft, NT, ND  Ext: WWP, no CCE  Skin: No rash  Neuro: A&Ox3, no focal deficits    HOSPITAL MEDICATIONS:  MEDICATIONS  (STANDING):  albuterol/ipratropium for Nebulization 3 milliLiter(s) Nebulizer every 6 hours  benzocaine/menthol Lozenge 1 Lozenge Oral two times a day  clotrimazole 1% Vaginal Cream 1 Applicatorful Vaginal at bedtime  enoxaparin Injectable 40 milliGRAM(s) SubCutaneous every 12 hours  fluticasone propionate/ salmeterol 250-50 MICROgram(s) Diskus 1 Dose(s) Inhalation two times a day  gabapentin 100 milliGRAM(s) Oral two times a day  guaiFENesin Oral Liquid (Sugar-Free) 100 milliGRAM(s) Oral every 6 hours  lidocaine   4% Patch 1 Patch Transdermal daily  losartan 50 milliGRAM(s) Oral daily  pantoprazole    Tablet 40 milliGRAM(s) Oral before breakfast  polyethylene glycol 3350 17 Gram(s) Oral daily  rosuvastatin 40 milliGRAM(s) Oral at bedtime  senna 2 Tablet(s) Oral at bedtime    MEDICATIONS  (PRN):  acetaminophen     Tablet .. 650 milliGRAM(s) Oral every 6 hours PRN Temp greater or equal to 38C (100.4F), Mild Pain (1 - 3), Moderate Pain (4 - 6)  bismuth subsalicylate Liquid 15 milliLiter(s) Oral every 8 hours PRN ingestion  melatonin 3 milliGRAM(s) Oral at bedtime PRN Insomnia  promethazine 25 milliGRAM(s) Oral every 6 hours PRN allergies  traMADol 50 milliGRAM(s) Oral every 8 hours PRN Severe Pain (7 - 10)      LABS:    The Labs were reviewed by me   The Radiology was reviewed by me    EKG tracing reviewed by me    04-10    144  |  106  |  13  ----------------------------<  89  4.0   |  28  |  0.78  04-09    141  |  105  |  18  ----------------------------<  100[H]  4.0   |  25  |  0.94  04-08    138  |  100  |  22  ----------------------------<  113[H]  4.2   |  25  |  1.08    Ca    8.5      10 Apr 2025 08:23  Ca    8.4      09 Apr 2025 13:40  Ca    7.7[L]      08 Apr 2025 06:26  Phos  3.8     04-10  Mg     2.30     04-10    TPro  5.8[L]  /  Alb  2.9[L]  /  TBili  0.3  /  DBili  x   /  AST  10  /  ALT  13  /  AlkPhos  60  04-08  TPro  6.0  /  Alb  2.8[L]  /  TBili  0.4  /  DBili  x   /  AST  12  /  ALT  13  /  AlkPhos  70  04-07    Magnesium: 2.30 mg/dL (04-10-25 @ 08:23)  Magnesium: 2.30 mg/dL (04-09-25 @ 13:40)  Magnesium: 2.00 mg/dL (04-07-25 @ 18:56)    Phosphorus: 3.8 mg/dL (04-10-25 @ 08:23)  Phosphorus: 4.0 mg/dL (04-09-25 @ 13:40)  Phosphorus: 5.5 mg/dL (04-07-25 @ 18:56)                    Urinalysis Basic - ( 10 Apr 2025 08:23 )    Color: x / Appearance: x / SG: x / pH: x  Gluc: 89 mg/dL / Ketone: x  / Bili: x / Urobili: x   Blood: x / Protein: x / Nitrite: x   Leuk Esterase: x / RBC: x / WBC x   Sq Epi: x / Non Sq Epi: x / Bacteria: x                              8.1    16.28 )-----------( 452      ( 10 Apr 2025 08:23 )             28.4                         8.6    19.31 )-----------( 434      ( 09 Apr 2025 13:40 )             28.7                         8.2    19.62 )-----------( 365      ( 08 Apr 2025 15:35 )             27.4     CAPILLARY BLOOD GLUCOSE            MICROBIOLOGY:     RADIOLOGY:  [ ] Reviewed and interpreted by me    Point of Care Ultrasound Findings:    PFT:    EKG: none

## 2025-04-11 LAB
ALBUMIN SERPL ELPH-MCNC: 2.9 G/DL — LOW (ref 3.3–5)
ALP SERPL-CCNC: 59 U/L — SIGNIFICANT CHANGE UP (ref 40–120)
ALT FLD-CCNC: 15 U/L — SIGNIFICANT CHANGE UP (ref 4–33)
ANION GAP SERPL CALC-SCNC: 11 MMOL/L — SIGNIFICANT CHANGE UP (ref 7–14)
AST SERPL-CCNC: 14 U/L — SIGNIFICANT CHANGE UP (ref 4–32)
BASOPHILS # BLD AUTO: 0.15 K/UL — SIGNIFICANT CHANGE UP (ref 0–0.2)
BASOPHILS NFR BLD AUTO: 0.8 % — SIGNIFICANT CHANGE UP (ref 0–2)
BILIRUB SERPL-MCNC: 0.3 MG/DL — SIGNIFICANT CHANGE UP (ref 0.2–1.2)
BUN SERPL-MCNC: 10 MG/DL — SIGNIFICANT CHANGE UP (ref 7–23)
CALCIUM SERPL-MCNC: 8.3 MG/DL — LOW (ref 8.4–10.5)
CHLORIDE SERPL-SCNC: 103 MMOL/L — SIGNIFICANT CHANGE UP (ref 98–107)
CO2 SERPL-SCNC: 29 MMOL/L — SIGNIFICANT CHANGE UP (ref 22–31)
CREAT SERPL-MCNC: 0.73 MG/DL — SIGNIFICANT CHANGE UP (ref 0.5–1.3)
EGFR: 81 ML/MIN/1.73M2 — SIGNIFICANT CHANGE UP
EGFR: 81 ML/MIN/1.73M2 — SIGNIFICANT CHANGE UP
EOSINOPHIL # BLD AUTO: 0.2 K/UL — SIGNIFICANT CHANGE UP (ref 0–0.5)
EOSINOPHIL NFR BLD AUTO: 1.1 % — SIGNIFICANT CHANGE UP (ref 0–6)
GLUCOSE SERPL-MCNC: 106 MG/DL — HIGH (ref 70–99)
HCT VFR BLD CALC: 26.8 % — LOW (ref 34.5–45)
HGB BLD-MCNC: 8 G/DL — LOW (ref 11.5–15.5)
IANC: 11.6 K/UL — HIGH (ref 1.8–7.4)
IMM GRANULOCYTES NFR BLD AUTO: 9.8 % — HIGH (ref 0–0.9)
LYMPHOCYTES # BLD AUTO: 1.54 K/UL — SIGNIFICANT CHANGE UP (ref 1–3.3)
LYMPHOCYTES # BLD AUTO: 8.1 % — LOW (ref 13–44)
MCHC RBC-ENTMCNC: 16.7 PG — LOW (ref 27–34)
MCHC RBC-ENTMCNC: 29.9 G/DL — LOW (ref 32–36)
MCV RBC AUTO: 55.9 FL — LOW (ref 80–100)
MONOCYTES # BLD AUTO: 3.63 K/UL — HIGH (ref 0–0.9)
MONOCYTES NFR BLD AUTO: 19.1 % — HIGH (ref 2–14)
NEUTROPHILS # BLD AUTO: 11.6 K/UL — HIGH (ref 1.8–7.4)
NEUTROPHILS NFR BLD AUTO: 61.1 % — SIGNIFICANT CHANGE UP (ref 43–77)
NRBC # BLD AUTO: 0 K/UL — SIGNIFICANT CHANGE UP (ref 0–0)
NRBC # FLD: 0 K/UL — SIGNIFICANT CHANGE UP (ref 0–0)
NRBC BLD AUTO-RTO: 0 /100 WBCS — SIGNIFICANT CHANGE UP (ref 0–0)
PLATELET # BLD AUTO: 506 K/UL — HIGH (ref 150–400)
POTASSIUM SERPL-MCNC: 3.3 MMOL/L — LOW (ref 3.5–5.3)
POTASSIUM SERPL-SCNC: 3.3 MMOL/L — LOW (ref 3.5–5.3)
PROT SERPL-MCNC: 6.1 G/DL — SIGNIFICANT CHANGE UP (ref 6–8.3)
RBC # BLD: 4.79 M/UL — SIGNIFICANT CHANGE UP (ref 3.8–5.2)
RBC # FLD: 20.8 % — HIGH (ref 10.3–14.5)
SODIUM SERPL-SCNC: 143 MMOL/L — SIGNIFICANT CHANGE UP (ref 135–145)
WBC # BLD: 18.97 K/UL — HIGH (ref 3.8–10.5)
WBC # FLD AUTO: 18.97 K/UL — HIGH (ref 3.8–10.5)

## 2025-04-11 PROCEDURE — 99223 1ST HOSP IP/OBS HIGH 75: CPT | Mod: GC

## 2025-04-11 PROCEDURE — 99233 SBSQ HOSP IP/OBS HIGH 50: CPT

## 2025-04-11 RX ADMIN — DEXTROMETHORPHAN HBR, GUAIFENESIN 100 MILLIGRAM(S): 200 LIQUID ORAL at 05:05

## 2025-04-11 RX ADMIN — ENOXAPARIN SODIUM 40 MILLIGRAM(S): 100 INJECTION SUBCUTANEOUS at 05:05

## 2025-04-11 RX ADMIN — GABAPENTIN 100 MILLIGRAM(S): 400 CAPSULE ORAL at 05:04

## 2025-04-11 RX ADMIN — Medication 1 DOSE(S): at 21:25

## 2025-04-11 RX ADMIN — IPRATROPIUM BROMIDE AND ALBUTEROL SULFATE 3 MILLILITER(S): .5; 2.5 SOLUTION RESPIRATORY (INHALATION) at 15:15

## 2025-04-11 RX ADMIN — Medication 1 DOSE(S): at 09:32

## 2025-04-11 RX ADMIN — Medication 1 LOZENGE: at 17:35

## 2025-04-11 RX ADMIN — ROSUVASTATIN CALCIUM 40 MILLIGRAM(S): 20 TABLET, FILM COATED ORAL at 21:25

## 2025-04-11 RX ADMIN — LOSARTAN POTASSIUM 50 MILLIGRAM(S): 100 TABLET, FILM COATED ORAL at 05:04

## 2025-04-11 RX ADMIN — CLOTRIMAZOLE 1 APPLICATORFUL: 1 CREAM TOPICAL at 21:25

## 2025-04-11 RX ADMIN — Medication 1 LOZENGE: at 05:04

## 2025-04-11 RX ADMIN — IPRATROPIUM BROMIDE AND ALBUTEROL SULFATE 3 MILLILITER(S): .5; 2.5 SOLUTION RESPIRATORY (INHALATION) at 20:28

## 2025-04-11 RX ADMIN — Medication 40 MILLIEQUIVALENT(S): at 11:32

## 2025-04-11 RX ADMIN — IPRATROPIUM BROMIDE AND ALBUTEROL SULFATE 3 MILLILITER(S): .5; 2.5 SOLUTION RESPIRATORY (INHALATION) at 08:44

## 2025-04-11 RX ADMIN — ENOXAPARIN SODIUM 40 MILLIGRAM(S): 100 INJECTION SUBCUTANEOUS at 17:35

## 2025-04-11 RX ADMIN — IPRATROPIUM BROMIDE AND ALBUTEROL SULFATE 3 MILLILITER(S): .5; 2.5 SOLUTION RESPIRATORY (INHALATION) at 04:02

## 2025-04-11 RX ADMIN — GABAPENTIN 100 MILLIGRAM(S): 400 CAPSULE ORAL at 17:34

## 2025-04-11 RX ADMIN — Medication 40 MILLIGRAM(S): at 05:04

## 2025-04-11 NOTE — PROGRESS NOTE ADULT - PROBLEM SELECTOR PLAN 5
WBC 26 today  (was 65 on admission)  - infectious workup unrevealing - BCx/UA/CXR neg, QuantiFERON-TB Gold, fungal and viral serology pending  - ID evaluated at [facility] - empric zosyn x 7 days, rec flow cytometry, AFB, bacterial and fungal cx to be sent with biopsy  pt had worsening leucocytosis  , per daughter  her baseline is 14-15  - pt with  EBV IGM+ but no PCR detected ,  -ID f/u appreciated, leucocytosis likely reactive vs 2/2 malignancy  - ID rec to monitor off abx , will dc zosyn as pt completed 7 days of empiric treatment  - trend WBC

## 2025-04-11 NOTE — PROGRESS NOTE ADULT - ASSESSMENT
84F with hx of TAVR (2019), COPD (on PRN O2), HLD, S1 Fracture (02/2025 no surgical intervention) initially admitted to [facility] 3/31-4/4 for fever, found to have new LLL mass like consolidation, c/f malignancy, infectious workup negative, transferred to [facility] for bronchoscopy with interventional pulmonology.

## 2025-04-11 NOTE — PROGRESS NOTE ADULT - PROBLEM SELECTOR PLAN 9
DVT ppx: lovenox  DIET: DASH   DISPO: pending clinical course   plan of care d/w pt at bedside    4/9 - case d/w daughter on the phone , update provided  4/11 : case d/w son at bedside    case d/w ACP and SW

## 2025-04-11 NOTE — CONSULT NOTE ADULT - ATTENDING COMMENTS
85 yo F with hx of TAVR (2019), COPD (on PRN O2), HLD, S1 Fracture (02/2025 no surgical intervention) initially admitted to [facility] 3/31-4/4 for fever, found to have new LLL mass like consolidation transferred to [facility] for bronch/EBUS with path concerning for malignancy, pending immunostains/IHC. CT Chest/Abdomen/Pelvis (C/A/P) 4/1/25:  2.4 x 2.4 cm irregular, non-calcified mass-like density on the medial aspect of the left lower lobe, abutting the pleura. Surgical path 4/7/25 of Left lower lobe nodule biopsy via EBUS: Positive for malignant cells; immunostains/IHC pending. Lymph node biopsy: Negative for malignancy. Would recommend MR brain as part of staging workup. Given recent bone fracture would also get bone scan, although the fracture is likely due to fall. Recommend standing hycodan qhs for cough which is preventing the patient from sleeping. Will arrange for medical oncology follow up for consultation at [facility] to discuss treatment options. Plan discussed with patient and daughter at bedside.

## 2025-04-11 NOTE — PROGRESS NOTE ADULT - PROBLEM SELECTOR PLAN 1
LLL noted on imaging  - CT with 2.4cm lung nodule  - IR unable to obtain percutaneous biopsy  - appreciate IP ,s/p navigational bronchoscopy 4/7/25 with transbronchial biopsy of the LLL, EBUS with lymph node biopsies and BAL   post procedure CXR with  no pneumothorax or right pleural effusion.  - BAL negative for infection so far   -  cytopathology noted : Left lower lobe nodule biopsy Positive for malignant cells; immunostains/IHC pending.  Lymph node biopsy: Negative for malignancy.  Interventional pulm f/u appreciated, oncology consulted , rec MRI  head w/wo con

## 2025-04-11 NOTE — CONSULT NOTE ADULT - SUBJECTIVE AND OBJECTIVE BOX
[de-identified] : On 2 7X-ray evaluation of the left tibia team 23 (AP and lateral views) reveals a healing fracture of the left tibia and fibula without deformity. Hematology Consult Note    HPI as per admitting team:   84F with hx of TAVR (2019), COPD (on PRN O2), HLD, S1 Fracture (02/2025 no surgical intervention) initially admitted to [facility] 3/31-4/4 for fever, found to have new LL mass like consolidation, c/f malignancy, infectious workup negative, transferred to [facility] for bronchoscopy with interventional pulmonology. Patient denies fevers, chills, cp, sob, abdominal pain, n/v/d, dysuria, sick contacts, recent travel.     Infectious workup at [facility] - BCx, UA, CXR neg. QuantiFERON-TB Gold, fungal and viral serology pending. ID requests AFB, bacterial/fungal cultures, and flow cytometry of the biopsy specimen.    (04 Apr 2025 15:56)        REVIEW OF SYSTEMS:    CONSTITUTIONAL: No weakness, fevers or chills  EYES/ENT: No visual changes;  No vertigo or throat pain   NECK: No pain or stiffness  RESPIRATORY: No cough, wheezing, hemoptysis; No shortness of breath  CARDIOVASCULAR: No chest pain or palpitations  GASTROINTESTINAL: No abdominal or epigastric pain. No nausea, vomiting, or hematemesis; No diarrhea or constipation. No melena or hematochezia.  GENITOURINARY: No dysuria, frequency or hematuria  NEUROLOGICAL: No numbness or weakness  SKIN: No itching, burning, rashes, or lesions   All other review of systems is negative unless indicated above.    PAST MEDICAL & SURGICAL HISTORY:  HTN (hypertension)      COPD (chronic obstructive pulmonary disease)      HLD (hyperlipidemia)      S/P TAVR (transcatheter aortic valve replacement)          FAMILY HISTORY:      SOCIAL HISTORY:     Allergies    fish (Unknown)  tetracycline (Rash; Urticaria)    Intolerances        MEDICATIONS  (STANDING):  albuterol/ipratropium for Nebulization 3 milliLiter(s) Nebulizer every 6 hours  benzocaine/menthol Lozenge 1 Lozenge Oral two times a day  clotrimazole 1% Vaginal Cream 1 Applicatorful Vaginal at bedtime  enoxaparin Injectable 40 milliGRAM(s) SubCutaneous every 12 hours  fluticasone propionate/ salmeterol 250-50 MICROgram(s) Diskus 1 Dose(s) Inhalation two times a day  gabapentin 100 milliGRAM(s) Oral two times a day  hydrocodone/homatropine Syrup 5 milliLiter(s) Oral daily  lidocaine   4% Patch 1 Patch Transdermal daily  losartan 50 milliGRAM(s) Oral daily  pantoprazole    Tablet 40 milliGRAM(s) Oral before breakfast  polyethylene glycol 3350 17 Gram(s) Oral daily  rosuvastatin 40 milliGRAM(s) Oral at bedtime  senna 2 Tablet(s) Oral at bedtime    MEDICATIONS  (PRN):  acetaminophen     Tablet .. 650 milliGRAM(s) Oral every 6 hours PRN Temp greater or equal to 38C (100.4F), Mild Pain (1 - 3), Moderate Pain (4 - 6)  bismuth subsalicylate Liquid 15 milliLiter(s) Oral every 8 hours PRN ingestion  melatonin 3 milliGRAM(s) Oral at bedtime PRN Insomnia  promethazine 25 milliGRAM(s) Oral every 6 hours PRN allergies  traMADol 50 milliGRAM(s) Oral every 8 hours PRN Severe Pain (7 - 10)      OBJECTIVE       T(F): 97.5 (04-11-25 @ 09:53), Max: 99 (04-10-25 @ 22:45)  HR: 83 (04-11-25 @ 09:53)  BP: 133/61 (04-11-25 @ 09:53)  RR: 18 (04-11-25 @ 09:53)  SpO2: 98% (04-11-25 @ 09:53)  Wt(kg): --    PHYSICAL EXAM   GENERAL: NAD, well-developed  HEAD:  Atraumatic, Normocephalic  EYES: EOMI, PERRLA, conjunctiva and sclera clear  NECK: Supple, No JVD  CHEST/LUNG: Clear to auscultation bilaterally; No wheeze  HEART: Regular rate and rhythm; No murmurs, rubs, or gallops  ABDOMEN: Soft, Nontender, Nondistended; Bowel sounds present  EXTREMITIES:  2+ Peripheral Pulses, No clubbing, cyanosis, or edema  NEUROLOGY: non-focal  SKIN: No rashes or lesions    LABS:                    8.0    18.97 )-----------( 506      ( 11 Apr 2025 06:43 )             26.8       04-11    143  |  103  |  10  ----------------------------<  106[H]  3.3[L]   |  29  |  0.73    Ca    8.3[L]      11 Apr 2025 06:43  Phos  3.8     04-10  Mg     2.30     04-10    TPro  6.1  /  Alb  2.9[L]  /  TBili  0.3  /  DBili  x   /  AST  14  /  ALT  15  /  AlkPhos  59  04-11        RADIOLOGY:    < from: CT Chest No Cont (03.31.25 @ 19:20) >    IMPRESSION:  Left lower lobe 2.4 cm nodule new since October 2, 2019 concerning for   neoplasm, possibly primary lung neoplasm.    Enlargement of the partially imaged spleen with interval increase in size   since 2019.    Oncology consultation is recommended.    --- End of Report ---    < end of copied text >  < from: Xray Chest 1 View- PORTABLE-Urgent (03.31.25 @ 19:26) >  IMPRESSION:  Left retrocardiac nodular opacity, better appreciated in CT of the chest.   Follow-up dedicated CT chest report.    < end of copied text >  < from: CT Abdomen and Pelvis w/ IV Cont (04.01.25 @ 18:15) >  IMPRESSION:  2.4 x 2.4 cm irregular noncalcified masslike density medial aspect of the   left lower lobe abutting the pleura which is new since October 2019.   Presumed neoplastic etiology until proven otherwise. PET/CT correlation   recommended for further evaluation.    2 cm left adrenal nodule previously measured 1.2 cm October 2019 with   attenuation values atthat time consistent with adenoma.    Spleen is enlarged measuring 15.5 cm. and increased in size from prior   exam October 2019. No focal defects    Scattered subcentimeter periaortic lymph nodes. Small bilateral external   iliac lymph nodes measuring up to 1.1 x 0.9 cm.    Please refer to detailed findings otherwise described above.    < end of copied text >  < from: CT Chest w/ IV Cont (04.01.25 @ 18:15) >    IMPRESSION:  2.4 x 2.4 cm irregular noncalcified masslike density medial aspect of the   left lower lobe abutting the pleura which is new since October 2019.   Presumed neoplastic etiology until proven otherwise. PET/CT correlation   recommended for further evaluation.    2 cm left adrenal nodule previously measured 1.2 cm October 2019 with   attenuation values atthat time consistent with adenoma.    Spleen is enlarged measuring 15.5 cm. and increased in size from prior   exam October 2019. No focal defects    Scattered subcentimeter periaortic lymph nodes. Small bilateral external   iliac lymph nodes measuring up to 1.1 x 0.9 cm.    Please refer to detailed findings otherwise described above.        --- End of Report ---    < end of copied text >  < from: Xray Chest 1 View- PORTABLE-Urgent (Xray Chest 1 View- PORTABLE-Urgent .) (04.07.25 @ 16:37) >    IMPRESSION:    New left basilar hazy opacity, which may represent pleural effusion   and/or atelectasis.  Redemonstrated left retrocardiac nodular opacity, better appreciated CT   chest 4/1/2025.    --- End of Report ---          < end of copied text >     Hematology Consult Note    HPI as per admitting team:   84F with hx of TAVR (2019), COPD (on PRN O2), HLD, S1 Fracture (02/2025 no surgical intervention) initially admitted to [facility] 3/31-4/4 for fever, found to have new LL mass like consolidation, c/f malignancy, infectious workup negative, transferred to [facility] for bronchoscopy with interventional pulmonology. Patient denies fevers, chills, cp, sob, abdominal pain, n/v/d, dysuria, sick contacts, recent travel.     Infectious workup at [facility] - BCx, UA, CXR neg. QuantiFERON-TB Gold, fungal and viral serology pending. ID requests AFB, bacterial/fungal cultures, and flow cytometry of the biopsy specimen.    (04 Apr 2025 15:56)        REVIEW OF SYSTEMS:    CONSTITUTIONAL: difficulty walking post s1 fracture, fever   EYES/ENT: No visual changes;  No vertigo or throat pain   NECK: No pain or stiffness  RESPIRATORY: No cough, wheezing, hemoptysis; No shortness of breath  CARDIOVASCULAR: No chest pain or palpitations  GASTROINTESTINAL: No abdominal or epigastric pain. No nausea, vomiting, or hematemesis; No diarrhea or constipation. No melena or hematochezia.  GENITOURINARY: No dysuria, frequency or hematuria  NEUROLOGICAL: No numbness or weakness  SKIN: No itching, burning, rashes, or lesions   All other review of systems is negative unless indicated above.    PAST MEDICAL & SURGICAL HISTORY:  HTN (hypertension)      COPD (chronic obstructive pulmonary disease)      HLD (hyperlipidemia)      S/P TAVR (transcatheter aortic valve replacement)          FAMILY HISTORY: Denies any family hx of cancers      SOCIAL HISTORY: Former smoker quit 15-20 years ago, denies etoh or illicit drug use.    Lives at home alone    Allergies    fish (Unknown)  tetracycline (Rash; Urticaria)    Intolerances        MEDICATIONS  (STANDING):  albuterol/ipratropium for Nebulization 3 milliLiter(s) Nebulizer every 6 hours  benzocaine/menthol Lozenge 1 Lozenge Oral two times a day  clotrimazole 1% Vaginal Cream 1 Applicatorful Vaginal at bedtime  enoxaparin Injectable 40 milliGRAM(s) SubCutaneous every 12 hours  fluticasone propionate/ salmeterol 250-50 MICROgram(s) Diskus 1 Dose(s) Inhalation two times a day  gabapentin 100 milliGRAM(s) Oral two times a day  hydrocodone/homatropine Syrup 5 milliLiter(s) Oral daily  lidocaine   4% Patch 1 Patch Transdermal daily  losartan 50 milliGRAM(s) Oral daily  pantoprazole    Tablet 40 milliGRAM(s) Oral before breakfast  polyethylene glycol 3350 17 Gram(s) Oral daily  rosuvastatin 40 milliGRAM(s) Oral at bedtime  senna 2 Tablet(s) Oral at bedtime    MEDICATIONS  (PRN):  acetaminophen     Tablet .. 650 milliGRAM(s) Oral every 6 hours PRN Temp greater or equal to 38C (100.4F), Mild Pain (1 - 3), Moderate Pain (4 - 6)  bismuth subsalicylate Liquid 15 milliLiter(s) Oral every 8 hours PRN ingestion  melatonin 3 milliGRAM(s) Oral at bedtime PRN Insomnia  promethazine 25 milliGRAM(s) Oral every 6 hours PRN allergies  traMADol 50 milliGRAM(s) Oral every 8 hours PRN Severe Pain (7 - 10)      OBJECTIVE       T(F): 97.5 (04-11-25 @ 09:53), Max: 99 (04-10-25 @ 22:45)  HR: 83 (04-11-25 @ 09:53)  BP: 133/61 (04-11-25 @ 09:53)  RR: 18 (04-11-25 @ 09:53)  SpO2: 98% (04-11-25 @ 09:53)  Wt(kg): --    PHYSICAL EXAM   GENERAL: NAD  HEAD:  Atraumatic, Normocephalic  EYES: EOMI, conjunctiva and sclera clear  CHEST/LUNG: Clear to auscultation bilaterally  HEART: Regular rate and rhythm  ABDOMEN: Soft, Nontender, Nondistended  EXTREMITIES:  No clubbing, cyanosis, or edema  NEUROLOGY: non-focal  SKIN: No rashes or lesions    LABS:                    8.0    18.97 )-----------( 506      ( 11 Apr 2025 06:43 )             26.8       04-11    143  |  103  |  10  ----------------------------<  106[H]  3.3[L]   |  29  |  0.73    Ca    8.3[L]      11 Apr 2025 06:43  Phos  3.8     04-10  Mg     2.30     04-10    TPro  6.1  /  Alb  2.9[L]  /  TBili  0.3  /  DBili  x   /  AST  14  /  ALT  15  /  AlkPhos  59  04-11        RADIOLOGY:    < from: CT Chest No Cont (03.31.25 @ 19:20) >    IMPRESSION:  Left lower lobe 2.4 cm nodule new since October 2, 2019 concerning for   neoplasm, possibly primary lung neoplasm.    Enlargement of the partially imaged spleen with interval increase in size   since 2019.    Oncology consultation is recommended.    --- End of Report ---    < end of copied text >  < from: Xray Chest 1 View- PORTABLE-Urgent (03.31.25 @ 19:26) >  IMPRESSION:  Left retrocardiac nodular opacity, better appreciated in CT of the chest.   Follow-up dedicated CT chest report.    < end of copied text >  < from: CT Abdomen and Pelvis w/ IV Cont (04.01.25 @ 18:15) >  IMPRESSION:  2.4 x 2.4 cm irregular noncalcified masslike density medial aspect of the   left lower lobe abutting the pleura which is new since October 2019.   Presumed neoplastic etiology until proven otherwise. PET/CT correlation   recommended for further evaluation.    2 cm left adrenal nodule previously measured 1.2 cm October 2019 with   attenuation values atthat time consistent with adenoma.    Spleen is enlarged measuring 15.5 cm. and increased in size from prior   exam October 2019. No focal defects    Scattered subcentimeter periaortic lymph nodes. Small bilateral external   iliac lymph nodes measuring up to 1.1 x 0.9 cm.    Please refer to detailed findings otherwise described above.    < end of copied text >  < from: CT Chest w/ IV Cont (04.01.25 @ 18:15) >    IMPRESSION:  2.4 x 2.4 cm irregular noncalcified masslike density medial aspect of the   left lower lobe abutting the pleura which is new since October 2019.   Presumed neoplastic etiology until proven otherwise. PET/CT correlation   recommended for further evaluation.    2 cm left adrenal nodule previously measured 1.2 cm October 2019 with   attenuation values atthat time consistent with adenoma.    Spleen is enlarged measuring 15.5 cm. and increased in size from prior   exam October 2019. No focal defects    Scattered subcentimeter periaortic lymph nodes. Small bilateral external   iliac lymph nodes measuring up to 1.1 x 0.9 cm.    Please refer to detailed findings otherwise described above.        --- End of Report ---    < end of copied text >  < from: Xray Chest 1 View- PORTABLE-Urgent (Xray Chest 1 View- PORTABLE-Urgent .) (04.07.25 @ 16:37) >    IMPRESSION:    New left basilar hazy opacity, which may represent pleural effusion   and/or atelectasis.  Redemonstrated left retrocardiac nodular opacity, better appreciated CT   chest 4/1/2025.    --- End of Report ---          < end of copied text >     Hematology Consult Note    HPI as per admitting team:   85 yo F with hx of TAVR (2019), COPD (on PRN O2), HLD, S1 Fracture (02/2025 no surgical intervention) initially admitted to [facility] 3/31-4/4 for fever, found to have new LL mass like consolidation, c/f malignancy, infectious workup negative, transferred to [facility] for bronchoscopy with interventional pulmonology. Patient denies fevers, chills, cp, sob, abdominal pain, n/v/d, dysuria, sick contacts, recent travel.     Infectious workup at [facility] - BCx, UA, CXR neg. QuantiFERON-TB Gold, fungal and viral serology pending. ID requests AFB, bacterial/fungal cultures, and flow cytometry of the biopsy specimen.    (04 Apr 2025 15:56)        REVIEW OF SYSTEMS:    CONSTITUTIONAL: difficulty walking post s1 fracture, fever   EYES/ENT: No visual changes;  No vertigo or throat pain   NECK: No pain or stiffness  RESPIRATORY: No cough, wheezing, hemoptysis; No shortness of breath  CARDIOVASCULAR: No chest pain or palpitations  GASTROINTESTINAL: No abdominal or epigastric pain. No nausea, vomiting, or hematemesis; No diarrhea or constipation. No melena or hematochezia.  GENITOURINARY: No dysuria, frequency or hematuria  NEUROLOGICAL: No numbness or weakness  SKIN: No itching, burning, rashes, or lesions   All other review of systems is negative unless indicated above.    PAST MEDICAL & SURGICAL HISTORY:  HTN (hypertension)      COPD (chronic obstructive pulmonary disease)      HLD (hyperlipidemia)      S/P TAVR (transcatheter aortic valve replacement)          FAMILY HISTORY: Denies any family hx of cancers      SOCIAL HISTORY: Former smoker quit 15-20 years ago, denies etoh or illicit drug use.    Lives at home alone    Allergies    fish (Unknown)  tetracycline (Rash; Urticaria)    Intolerances        MEDICATIONS  (STANDING):  albuterol/ipratropium for Nebulization 3 milliLiter(s) Nebulizer every 6 hours  benzocaine/menthol Lozenge 1 Lozenge Oral two times a day  clotrimazole 1% Vaginal Cream 1 Applicatorful Vaginal at bedtime  enoxaparin Injectable 40 milliGRAM(s) SubCutaneous every 12 hours  fluticasone propionate/ salmeterol 250-50 MICROgram(s) Diskus 1 Dose(s) Inhalation two times a day  gabapentin 100 milliGRAM(s) Oral two times a day  hydrocodone/homatropine Syrup 5 milliLiter(s) Oral daily  lidocaine   4% Patch 1 Patch Transdermal daily  losartan 50 milliGRAM(s) Oral daily  pantoprazole    Tablet 40 milliGRAM(s) Oral before breakfast  polyethylene glycol 3350 17 Gram(s) Oral daily  rosuvastatin 40 milliGRAM(s) Oral at bedtime  senna 2 Tablet(s) Oral at bedtime    MEDICATIONS  (PRN):  acetaminophen     Tablet .. 650 milliGRAM(s) Oral every 6 hours PRN Temp greater or equal to 38C (100.4F), Mild Pain (1 - 3), Moderate Pain (4 - 6)  bismuth subsalicylate Liquid 15 milliLiter(s) Oral every 8 hours PRN ingestion  melatonin 3 milliGRAM(s) Oral at bedtime PRN Insomnia  promethazine 25 milliGRAM(s) Oral every 6 hours PRN allergies  traMADol 50 milliGRAM(s) Oral every 8 hours PRN Severe Pain (7 - 10)      OBJECTIVE       T(F): 97.5 (04-11-25 @ 09:53), Max: 99 (04-10-25 @ 22:45)  HR: 83 (04-11-25 @ 09:53)  BP: 133/61 (04-11-25 @ 09:53)  RR: 18 (04-11-25 @ 09:53)  SpO2: 98% (04-11-25 @ 09:53)  Wt(kg): --    PHYSICAL EXAM   GENERAL: NAD  HEAD:  Atraumatic, Normocephalic  EYES: EOMI, conjunctiva and sclera clear  CHEST/LUNG: Clear to auscultation bilaterally  HEART: Regular rate and rhythm  ABDOMEN: Soft, Nontender, Nondistended  EXTREMITIES:  No clubbing, cyanosis, or edema  NEUROLOGY: non-focal  SKIN: No rashes or lesions    LABS:                    8.0    18.97 )-----------( 506      ( 11 Apr 2025 06:43 )             26.8       04-11    143  |  103  |  10  ----------------------------<  106[H]  3.3[L]   |  29  |  0.73    Ca    8.3[L]      11 Apr 2025 06:43  Phos  3.8     04-10  Mg     2.30     04-10    TPro  6.1  /  Alb  2.9[L]  /  TBili  0.3  /  DBili  x   /  AST  14  /  ALT  15  /  AlkPhos  59  04-11        RADIOLOGY:    < from: CT Chest No Cont (03.31.25 @ 19:20) >    IMPRESSION:  Left lower lobe 2.4 cm nodule new since October 2, 2019 concerning for   neoplasm, possibly primary lung neoplasm.    Enlargement of the partially imaged spleen with interval increase in size   since 2019.    Oncology consultation is recommended.    --- End of Report ---    < end of copied text >  < from: Xray Chest 1 View- PORTABLE-Urgent (03.31.25 @ 19:26) >  IMPRESSION:  Left retrocardiac nodular opacity, better appreciated in CT of the chest.   Follow-up dedicated CT chest report.    < end of copied text >  < from: CT Abdomen and Pelvis w/ IV Cont (04.01.25 @ 18:15) >  IMPRESSION:  2.4 x 2.4 cm irregular noncalcified masslike density medial aspect of the   left lower lobe abutting the pleura which is new since October 2019.   Presumed neoplastic etiology until proven otherwise. PET/CT correlation   recommended for further evaluation.    2 cm left adrenal nodule previously measured 1.2 cm October 2019 with   attenuation values atthat time consistent with adenoma.    Spleen is enlarged measuring 15.5 cm. and increased in size from prior   exam October 2019. No focal defects    Scattered subcentimeter periaortic lymph nodes. Small bilateral external   iliac lymph nodes measuring up to 1.1 x 0.9 cm.    Please refer to detailed findings otherwise described above.    < end of copied text >  < from: CT Chest w/ IV Cont (04.01.25 @ 18:15) >    IMPRESSION:  2.4 x 2.4 cm irregular noncalcified masslike density medial aspect of the   left lower lobe abutting the pleura which is new since October 2019.   Presumed neoplastic etiology until proven otherwise. PET/CT correlation   recommended for further evaluation.    2 cm left adrenal nodule previously measured 1.2 cm October 2019 with   attenuation values atthat time consistent with adenoma.    Spleen is enlarged measuring 15.5 cm. and increased in size from prior   exam October 2019. No focal defects    Scattered subcentimeter periaortic lymph nodes. Small bilateral external   iliac lymph nodes measuring up to 1.1 x 0.9 cm.    Please refer to detailed findings otherwise described above.        --- End of Report ---    < end of copied text >  < from: Xray Chest 1 View- PORTABLE-Urgent (Xray Chest 1 View- PORTABLE-Urgent .) (04.07.25 @ 16:37) >    IMPRESSION:    New left basilar hazy opacity, which may represent pleural effusion   and/or atelectasis.  Redemonstrated left retrocardiac nodular opacity, better appreciated CT   chest 4/1/2025.    --- End of Report ---          < end of copied text >

## 2025-04-11 NOTE — PROGRESS NOTE ADULT - SUBJECTIVE AND OBJECTIVE BOX
Patient is a 84y old  Female who presents with a chief complaint of transfer from [facility] (09 Apr 2025 09:34)      SUBJECTIVE / OVERNIGHT EVENTS: patient seen and examined by bedside, pt c/o cough and chest discomfort with coughing , pt says her cough is chronic,and hycodal helps her ,   son at bedside     MEDICATIONS  (STANDING):  albuterol/ipratropium for Nebulization 3 milliLiter(s) Nebulizer every 6 hours  benzocaine/menthol Lozenge 1 Lozenge Oral two times a day  clotrimazole 1% Vaginal Cream 1 Applicatorful Vaginal at bedtime  enoxaparin Injectable 40 milliGRAM(s) SubCutaneous every 12 hours  fluticasone propionate/ salmeterol 250-50 MICROgram(s) Diskus 1 Dose(s) Inhalation two times a day  gabapentin 100 milliGRAM(s) Oral two times a day  hydrocodone/homatropine Syrup 5 milliLiter(s) Oral daily  lidocaine   4% Patch 1 Patch Transdermal daily  losartan 50 milliGRAM(s) Oral daily  pantoprazole    Tablet 40 milliGRAM(s) Oral before breakfast  polyethylene glycol 3350 17 Gram(s) Oral daily  rosuvastatin 40 milliGRAM(s) Oral at bedtime  senna 2 Tablet(s) Oral at bedtime    MEDICATIONS  (PRN):  acetaminophen     Tablet .. 650 milliGRAM(s) Oral every 6 hours PRN Temp greater or equal to 38C (100.4F), Mild Pain (1 - 3), Moderate Pain (4 - 6)  bismuth subsalicylate Liquid 15 milliLiter(s) Oral every 8 hours PRN ingestion  melatonin 3 milliGRAM(s) Oral at bedtime PRN Insomnia  promethazine 25 milliGRAM(s) Oral every 6 hours PRN allergies  traMADol 50 milliGRAM(s) Oral every 8 hours PRN Severe Pain (7 - 10)      Vital Signs Last 24 Hrs  T(C): 36.7 (11 Apr 2025 14:06), Max: 37.2 (10 Apr 2025 18:03)  T(F): 98 (11 Apr 2025 14:06), Max: 99 (10 Apr 2025 22:45)  HR: 80 (11 Apr 2025 14:06) (80 - 95)  BP: 134/67 (11 Apr 2025 14:06) (133/61 - 163/60)  BP(mean): --  RR: 17 (11 Apr 2025 14:06) (16 - 18)  SpO2: 99% (11 Apr 2025 14:06) (97% - 100%)    Parameters below as of 11 Apr 2025 14:06  Patient On (Oxygen Delivery Method): nasal cannula  O2 Flow (L/min): 3    CAPILLARY BLOOD GLUCOSE        I&O's Summary    10 Apr 2025 07:01  -  11 Apr 2025 07:00  --------------------------------------------------------  IN: 450 mL / OUT: 800 mL / NET: -350 mL  PHYSICAL EXAM:  CONSTITUTIONAL: NAD,  EYES: PERRLA; conjunctiva and sclera clear  NECK: Supple, no palpable masses;  RESPIRATORY: Normal respiratory effort; basilar crackles  bilaterally  CARDIOVASCULAR: Regular rate and rhythm, normal S1 and S2, no murmur/rub/gallop;   ABDOMEN: Nontender to palpation, normoactive bowel sounds, no rebound/guarding;   MUSCULOSKELETAL:    no clubbing or cyanosis of digits; no joint swelling or tenderness to palpation  PSYCH: A+O to person, place, and time; affect appropriate  NEUROLOGY: CN 2-12 are intact and symmetric; no gross sensory deficits;         LABS:                        8.0    18.97 )-----------( 506      ( 11 Apr 2025 06:43 )             26.8     04-11    143  |  103  |  10  ----------------------------<  106[H]  3.3[L]   |  29  |  0.73    Ca    8.3[L]      11 Apr 2025 06:43  Phos  3.8     04-10  Mg     2.30     04-10    TPro  6.1  /  Alb  2.9[L]  /  TBili  0.3  /  DBili  x   /  AST  14  /  ALT  15  /  AlkPhos  59  04-11          Urinalysis Basic - ( 11 Apr 2025 06:43 )    Color: x / Appearance: x / SG: x / pH: x  Gluc: 106 mg/dL / Ketone: x  / Bili: x / Urobili: x   Blood: x / Protein: x / Nitrite: x   Leuk Esterase: x / RBC: x / WBC x   Sq Epi: x / Non Sq Epi: x / Bacteria: x        RADIOLOGY & ADDITIONAL TESTS:  Cytopathology - Non Gyn Report (04.07.25 @ 14:15)    Cytopathology - Non Gyn Report:   ACCESSION No:  [id number]  Patient:     AGUSTÍN LAM      Accession:                             [id number]    Collected Date/Time:                   4/7/2025 14:15 EDT  Received Date/Time:                    4/7/2025 19:40 EDT    Fine Needle Aspiration Report - Auth (Verified)    Specimen(s) Submitted  1. LUNG, LEFT LOWER, ROBOTIC ASSISTED FORCEPS BIOPSY AND FNA  2. LYMPH NODE, INFERIOR, 11R, EBUS-GUIDED FNA  3. LYMPH NODE, LEVEL 7, EBUS-GUIDED FNA  4. LYMPH NODE, 11L, EBUS-GUIDED FNA  5. LUNG, LEFT LOWER LOBE, BRONCHOALVEOLAR LAVAGE    Final Diagnosis  1. LUNG, LEFT LOWER, ROBOTIC ASSISTED FORCEPS BIOPSY AND FNA    POSITIVE FOR MALIGNANT CELLS.    Cytology slides and biopsies reveal a hypercellular specimen composed  of syncytial sheets and numerous single pleomorphic cells with  irregular, hyperchromatic nuclei and dense cytoplasm in a background  of multinucleated giant cells. Immunostains for TTF1 and P40 will be  performed and reported as an addendum. Cell block is noncontributory to  the diagnosis.      2. LYMPH NODE, INFERIOR, 11R, EBUS-GUIDED FNA  NON DIAGNOSTIC.    Cytology slides and cell block show blood, scattered acute inflammation  and rare ciliated bronchial epithelial cells only.    3. LYMPH NODE, LEVEL 7, EBUS-GUIDED FNA  NEGATIVE FOR CARCINOMA.  No epithelial lesion identified.    The cytology slides and cell block show a polymorphous population of  lymphocytes.  Jann Alex cells, granulomas, or abnormal epithelial cells are  not identified. If a lymphoproliferative disorder is suspected, or  lymphadenopathy persists, tissue examination is recommend with flow  cytometry studies.    4. LYMPH NODE, 11L, EBUS-GUIDED FNA  NEGATIVE FOR CARCINOMA.  No epithelial lesion identified.    The cytology slides and cell block show a polymorphous population of  lymphocytes.  Jann Alex cells, granulomas, or abnormal epithelial cells are  not identified. If a lymphoproliferative disorder is suspected, or  lymphadenopathy persists, tissue examination is recommend with flow  cytometry studies.    5. LUNG, LEFT LOWER LOBE, BRONCHOALVEOLAR LAVAGE  ATYPICAL FINDINGS.    Cytology slide and cell block reveal scattered single pleomorphic  cells with irregular, hyperchromatic nuclei and dense cytoplasm, in  a background of benign ciliated bronchial epithelial cells, mixed  inflammatory cells and alveolar macrophages.      Clinical Information  History of smoking,  Left lower lobe lung mass 2.0 cm          Imaging Personally Reviewed:    Consultant(s) Notes Reviewed:  interventional pulmonary , Oncology , cardiology     Care Discussed with Consultants/Other Providers:

## 2025-04-11 NOTE — CONSULT NOTE ADULT - ASSESSMENT
84F with hx of TAVR (2019), COPD (on PRN O2), HLD, S1 Fracture (02/2025 no surgical intervention) initially admitted to [facility] 3/31-4/4 for fever, found to have new LL mass like consolidation transferred to [facility] for bronch/EBUS with path concerning for malignacy.    #Newly diagnosed lung cancer   -  CT C/A/P 4/1/25: 2.4 x 2.4 cm irregular noncalcified masslike density medial aspect of the left lower lobe abutting the pleura which is new since October 2019. presumed neoplastic etiology until proven otherwise.2 cm left adrenal nodule previously measured 1.2 cm October 2019 with attenuation values atthat time consistent with adenoma. Spleen is enlarged measuring 15.5 cm. and increased in size from prior exam October 2019. No focal defects. Scattered subcentimeter periaortic lymph nodes. Small bilaeral external iliac lymph nodes measuring up to 1.1 x 0.9 cm.  - Surgical path 4/7/25 from LLL nodule biopsy via EBUS: POSITIVE FOR MALIGNANT CELLS; pending immunostains/IHC; Biopsy for LN: negative   - Team previously consulted for leukocytosis likely leukomoid reaction; flow 4/2/25 The lymphocyte population is decreased, immunophenotypic findings show no diagnosticabnormalities. The myeloid immunophenotypic findings show no myeloblasts, increased myeloid population,normal myeloid granularity, and normal myeloid antigen pattern. Monocytosis (21%) with increased intermediate monocyte subset; findings likely reactive     Recommendations:  - Pending MR head w w/o contrast  - Based on current scan findings, likely to be early stage and likely surgical candidate  - Recommend CTSx eval  - Discussed with path, preliminary findings are NOT small cell/neuroendocrine feature, hence can be discharged with outpatient follow up from oncology standpoint   - Will arrange for medical oncology follow up on discharge at [facility]     **INCOMPLETE NOTE**    Note not finalized until signed by attending.   Please do not hesitate to page with questions.     Gela Pacheco MD  Hematology/Oncology Fellow PGY5  Available on Microsoft Teams   Pager: [phone]  For weekends and evenings (5 pm - 8 am), please page fellow on call.  84F with hx of TAVR (2019), COPD (on PRN O2), HLD, S1 Fracture (02/2025 no surgical intervention) initially admitted to [facility] 3/31-4/4 for fever, found to have new LL mass like consolidation transferred to [facility] for bronch/EBUS with path concerning for malignancy    #Newly diagnosed lung cancer (pending immunostains/IHC)  -  CT C/A/P 4/1/25: 2.4 x 2.4 cm irregular noncalcified masslike density medial aspect of the left lower lobe abutting the pleura which is new since October 2019. presumed neoplastic etiology until proven otherwise.2 cm left adrenal nodule previously measured 1.2 cm October 2019 with attenuation values at that time consistent with adenoma. Spleen is enlarged measuring 15.5 cm. and increased in size from prior exam October 2019. No focal defects. Scattered subcentimeter periaortic lymph nodes. Small bilateral external iliac lymph nodes measuring up to 1.1 x 0.9 cm.  - Surgical path 4/7/25 from LLL nodule biopsy via EBUS: POSITIVE FOR MALIGNANT CELLS; pending immunostains/IHC; Biopsy for LN: negative   - Team previously consulted for leukocytosis likely leukemoid reaction; flow 4/2/25 The lymphocyte population is decreased, immunophenotypic findings show no diagnostic abnormalities The myeloid immunophenotypic findings show no myeloblasts, increased myeloid population myeloid granularity, and normal myeloid antigen pattern. Monocytosis (21%) with increased intermediate monocyte subset. BCR-ABL negative.     Recommendations:  - Recommend MR head w w/o contrast  - Based on current scan findings, likely to be early stage and likely surgical candidate vs RT if operable  - Recommend CTSx eval  - Discussed with path, preliminary findings are NOT small cell/neuroendocrine feature, hence can be discharged with outpatient follow up from oncology standpoint   - Will arrange for medical oncology follow up on discharge at [facility]     **INCOMPLETE NOTE**    Note not finalized until signed by attending.   Please do not hesitate to page with questions.     Gela Pacheco MD  Hematology/Oncology Fellow PGY5  Available on Microsoft Teams   Pager: [phone]  For weekends and evenings (5 pm - 8 am), please page fellow on call.  84F with hx of TAVR (2019), COPD (on PRN O2), HLD, S1 Fracture (02/2025 no surgical intervention) initially admitted to [facility] 3/31-4/4 for fever, found to have new LL mass like consolidation transferred to [facility] for bronch/EBUS with path concerning for malignancy    #Newly diagnosed lung cancer (pending immunostains/IHC)  CT Chest/Abdomen/Pelvis (C/A/P) 4/1/25:  2.4 x 2.4 cm irregular, non-calcified mass-like density on the medial aspect of the left lower lobe, abutting the pleura. This finding is new since October 2019 and is presumed to be of neoplastic etiology until proven otherwise. left adrenal nodule previously measured 1.2 cm in October 2019, now measures 2 cm. The attenuation values are consistent with an adenoma.The spleen is enlarged (15.5 cm), an increase in size compared to the prior exam from October 2019.No focal defects in the spleen. Scattered subcentimeter periaortic lymph nodes noted. Small bilateral external iliac lymph nodes (up to 1.1 x 0.9 cm).  - Surgical path 4/7/25:   Left lower lobe nodule biopsy via EBUS: Positive for malignant cells; immunostains/IHC pending.  Lymph node biopsy: Negative for malignancy.  - Team previously consulted for leukocytosis; peripheral blood flow 4/2/25: The lymphocyte population is decreased, immunophenotypic findings show no diagnostic abnormalities. The myeloid immunophenotypic findings show no myeloblasts, increased myeloid population myeloid granularity, and normal myeloid antigen pattern. Monocytosis (21%) with increased intermediate monocyte subset. BCR-ABL negative, likely leukemoid reaction.    Recommendations:  - Recommend MR head w w/o contrast  - Based on the current CT scan findings, the mass appears to be in the early stage. Surgical resection is likely possible, but radiation therapy should be considered if the tumor is not operable.  - Possible CTSX eval   - Discussed with path, preliminary findings are NOT small cell/neuroendocrine feature, hence can be discharged with outpatient follow up from oncology standpoint   - PT eval: recommending NIKHIL  - Will arrange for medical oncology follow up on discharge at [facility]     **INCOMPLETE NOTE**    Note not finalized until signed by attending.   Please do not hesitate to page with questions.     Gela Pacheco MD  Hematology/Oncology Fellow PGY5  Available on Microsoft Teams   Pager: [phone]  For weekends and evenings (5 pm - 8 am), please page fellow on call.  84F with hx of TAVR (2019), COPD (on PRN O2), HLD, S1 Fracture (02/2025 no surgical intervention) initially admitted to [facility] 3/31-4/4 for fever, found to have new LL mass like consolidation transferred to [facility] for bronch/EBUS with path concerning for malignancy.    #Newly diagnosed lung cancer (pending immunostains/IHC)  CT Chest/Abdomen/Pelvis (C/A/P) 4/1/25:  2.4 x 2.4 cm irregular, non-calcified mass-like density on the medial aspect of the left lower lobe, abutting the pleura. This finding is new since October 2019 and is presumed to be of neoplastic etiology until proven otherwise. left adrenal nodule previously measured 1.2 cm in October 2019, now measures 2 cm. The attenuation values are consistent with an adenoma.The spleen is enlarged (15.5 cm), an increase in size compared to the prior exam from October 2019.No focal defects in the spleen. Scattered subcentimeter periaortic lymph nodes noted. Small bilateral external iliac lymph nodes (up to 1.1 x 0.9 cm).  - Surgical path 4/7/25:   Left lower lobe nodule biopsy via EBUS: Positive for malignant cells; immunostains/IHC pending.  Lymph node biopsy: Negative for malignancy.  - Team previously consulted for leukocytosis; peripheral blood flow 4/2/25: The lymphocyte population is decreased, immunophenotypic findings show no diagnostic abnormalities. The myeloid immunophenotypic findings show no myeloblasts, increased myeloid population myeloid granularity, and normal myeloid antigen pattern. Monocytosis (21%) with increased intermediate monocyte subset. BCR-ABL negative, likely leukemoid reaction.    Recommendations:  - Recommend MR head w w/o contrast  - Based on the current CT scan findings, the mass appears to be in the early stage. Treatment options to be discussed outpatient   - Recommend bone scan to evaluate for any metastatic disease given recent falls/fracture hx.  - Discussed with path, preliminary findings are NOT small cell/neuroendocrine feature, hence can be discharged with outpatient follow up from oncology standpoint   - PT eval: recommending NIKHIL  - Recommend standing hycodan oral cough drops for ongoing cough, symptom control   - Will arrange for medical oncology follow up on discharge at [facility], please notify team prior to discharge  - Plan discussed with patient and daughter at bedside,    **INCOMPLETE NOTE**    Note not finalized until signed by attending.   Please do not hesitate to page with questions.     Gela Pacheco MD  Hematology/Oncology Fellow PGY5  Available on Microsoft Teams   Pager: [phone]  For weekends and evenings (5 pm - 8 am), please page fellow on call.  83 yo F with hx of TAVR (2019), COPD (on PRN O2), HLD, S1 Fracture (02/2025 no surgical intervention) initially admitted to [facility] 3/31-4/4 for fever, found to have new LL mass like consolidation transferred to [facility] for bronch/EBUS with path concerning for malignancy.    #Newly diagnosed lung cancer (pending immunostains/IHC)  CT Chest/Abdomen/Pelvis (C/A/P) 4/1/25:  2.4 x 2.4 cm irregular, non-calcified mass-like density on the medial aspect of the left lower lobe, abutting the pleura. This finding is new since October 2019 and is presumed to be of neoplastic etiology until proven otherwise. left adrenal nodule previously measured 1.2 cm in October 2019, now measures 2 cm. The attenuation values are consistent with an adenoma.The spleen is enlarged (15.5 cm), an increase in size compared to the prior exam from October 2019.No focal defects in the spleen. Scattered subcentimeter periaortic lymph nodes noted. Small bilateral external iliac lymph nodes (up to 1.1 x 0.9 cm).  - Surgical path 4/7/25:   Left lower lobe nodule biopsy via EBUS: Positive for malignant cells; immunostains/IHC pending.  Lymph node biopsy: Negative for malignancy.  - Team previously consulted for leukocytosis; peripheral blood flow 4/2/25: The lymphocyte population is decreased, immunophenotypic findings show no diagnostic abnormalities. The myeloid immunophenotypic findings show no myeloblasts, increased myeloid population myeloid granularity, and normal myeloid antigen pattern. Monocytosis (21%) with increased intermediate monocyte subset. BCR-ABL negative, likely leukemoid reaction.    Recommendations:  - Recommend MR head w w/o contrast  - Based on the current CT scan findings, the mass appears to be in the early stage. Treatment options to be discussed outpatient   - Recommend bone scan to evaluate for any metastatic disease given recent falls/fracture hx.  - Discussed with path, preliminary findings are NOT small cell/neuroendocrine feature, hence can be discharged with outpatient follow up from oncology standpoint   - PT eval: recommending NIKHIL  - Recommend standing oral hycodan qhs for ongoing cough, symptom control, as the patient is having difficulty sleeping due to cough.  - Will arrange for medical oncology follow up on discharge at [facility], please notify team prior to discharge  - Plan discussed with patient and daughter at bedside.      Please do not hesitate to page with questions.     Gela Pacheco MD  Hematology/Oncology Fellow PGY5  Available on Microsoft Teams   Pager: [phone]  For weekends and evenings (5 pm - 8 am), please page fellow on call.

## 2025-04-11 NOTE — PROGRESS NOTE ADULT - PROBLEM SELECTOR PLAN 6
respiratory status stable  - continue advair BID, duoneb q6h   will dc Guafenesin   will change to Hycodan as pt says it helps her

## 2025-04-11 NOTE — PROGRESS NOTE ADULT - PROBLEM SELECTOR PLAN 4
pt  noted on intraoperative telemetry with abnormal ST segments,   pt reports  CP with coughing , unlikely cardiac as pain reproducible    cardiology eval appreciated   TTE noted as above, . Left ventricular cavity is normal in size. Left ventricular systolic function is normal with an ejection fraction of 67 % ,  There is mild (grade 1) left ventricular diastolic dysfunction.  . Normal right ventricular cavity size and normal right ventricular systolic function

## 2025-04-11 NOTE — PROGRESS NOTE ADULT - PROBLEM SELECTOR PLAN 2
febrile on admission to [facility]  - infectious workup unrevealing - BCx/UA/CXR neg, QuantiFERON-TB Gold, fungal and viral serology pending  - CT chest with LLL lung nodule - management as below, CT CAP without infectious etiology   - ID (Dr. Bui) evaluated at [facility] - rec flow cytometry, AFB, bacterial and fungal cx to be sent with biopsy, , , BAL negative for infection at this time , will f/u cytopathology   -on  Zosyn (planned for empiric 7 day course per ID at [facility]) -   -pt had worsening leucocytosis  ,now improving ,  per daughter  her baseline is 14-15  - pt with  EBV IGM+ but no PCR detected ,  -ID f/u appreciated, leucocytosis likely reactive vs 2/2 malignancy  - ID rec to monitor off abx , Dced zosyn as pt completed 7 days of empiric treatment

## 2025-04-12 LAB
ANION GAP SERPL CALC-SCNC: 12 MMOL/L — SIGNIFICANT CHANGE UP (ref 7–14)
BASOPHILS # BLD AUTO: 0.18 K/UL — SIGNIFICANT CHANGE UP (ref 0–0.2)
BASOPHILS NFR BLD AUTO: 0.8 % — SIGNIFICANT CHANGE UP (ref 0–2)
BUN SERPL-MCNC: 8 MG/DL — SIGNIFICANT CHANGE UP (ref 7–23)
CALCIUM SERPL-MCNC: 8.9 MG/DL — SIGNIFICANT CHANGE UP (ref 8.4–10.5)
CHLORIDE SERPL-SCNC: 102 MMOL/L — SIGNIFICANT CHANGE UP (ref 98–107)
CO2 SERPL-SCNC: 29 MMOL/L — SIGNIFICANT CHANGE UP (ref 22–31)
CREAT SERPL-MCNC: 0.73 MG/DL — SIGNIFICANT CHANGE UP (ref 0.5–1.3)
CULTURE RESULTS: SIGNIFICANT CHANGE UP
EGFR: 81 ML/MIN/1.73M2 — SIGNIFICANT CHANGE UP
EGFR: 81 ML/MIN/1.73M2 — SIGNIFICANT CHANGE UP
EOSINOPHIL # BLD AUTO: 0.13 K/UL — SIGNIFICANT CHANGE UP (ref 0–0.5)
EOSINOPHIL NFR BLD AUTO: 0.6 % — SIGNIFICANT CHANGE UP (ref 0–6)
GLUCOSE SERPL-MCNC: 112 MG/DL — HIGH (ref 70–99)
HCT VFR BLD CALC: 30 % — LOW (ref 34.5–45)
HGB BLD-MCNC: 8.9 G/DL — LOW (ref 11.5–15.5)
IANC: 14.47 K/UL — HIGH (ref 1.8–7.4)
IMM GRANULOCYTES NFR BLD AUTO: 8.1 % — HIGH (ref 0–0.9)
LYMPHOCYTES # BLD AUTO: 1.73 K/UL — SIGNIFICANT CHANGE UP (ref 1–3.3)
LYMPHOCYTES # BLD AUTO: 7.5 % — LOW (ref 13–44)
MAGNESIUM SERPL-MCNC: 1.8 MG/DL — SIGNIFICANT CHANGE UP (ref 1.6–2.6)
MCHC RBC-ENTMCNC: 16.5 PG — LOW (ref 27–34)
MCHC RBC-ENTMCNC: 29.7 G/DL — LOW (ref 32–36)
MCV RBC AUTO: 55.6 FL — LOW (ref 80–100)
MONOCYTES # BLD AUTO: 4.71 K/UL — HIGH (ref 0–0.9)
MONOCYTES NFR BLD AUTO: 20.4 % — HIGH (ref 2–14)
NEUTROPHILS # BLD AUTO: 14.47 K/UL — HIGH (ref 1.8–7.4)
NEUTROPHILS NFR BLD AUTO: 62.6 % — SIGNIFICANT CHANGE UP (ref 43–77)
NRBC # BLD AUTO: 0 K/UL — SIGNIFICANT CHANGE UP (ref 0–0)
NRBC # FLD: 0 K/UL — SIGNIFICANT CHANGE UP (ref 0–0)
NRBC BLD AUTO-RTO: 0 /100 WBCS — SIGNIFICANT CHANGE UP (ref 0–0)
PHOSPHATE SERPL-MCNC: 3.6 MG/DL — SIGNIFICANT CHANGE UP (ref 2.5–4.5)
PLATELET # BLD AUTO: 579 K/UL — HIGH (ref 150–400)
POTASSIUM SERPL-MCNC: 4 MMOL/L — SIGNIFICANT CHANGE UP (ref 3.5–5.3)
POTASSIUM SERPL-SCNC: 4 MMOL/L — SIGNIFICANT CHANGE UP (ref 3.5–5.3)
RBC # BLD: 5.4 M/UL — HIGH (ref 3.8–5.2)
RBC # FLD: 21 % — HIGH (ref 10.3–14.5)
SODIUM SERPL-SCNC: 143 MMOL/L — SIGNIFICANT CHANGE UP (ref 135–145)
SPECIMEN SOURCE: SIGNIFICANT CHANGE UP
WBC # BLD: 23.1 K/UL — HIGH (ref 3.8–10.5)
WBC # FLD AUTO: 23.1 K/UL — HIGH (ref 3.8–10.5)

## 2025-04-12 PROCEDURE — 99232 SBSQ HOSP IP/OBS MODERATE 35: CPT

## 2025-04-12 PROCEDURE — 78306 BONE IMAGING WHOLE BODY: CPT | Mod: 26

## 2025-04-12 RX ADMIN — ROSUVASTATIN CALCIUM 40 MILLIGRAM(S): 20 TABLET, FILM COATED ORAL at 22:21

## 2025-04-12 RX ADMIN — GABAPENTIN 100 MILLIGRAM(S): 400 CAPSULE ORAL at 19:23

## 2025-04-12 RX ADMIN — Medication 1 LOZENGE: at 06:26

## 2025-04-12 RX ADMIN — Medication 40 MILLIGRAM(S): at 06:16

## 2025-04-12 RX ADMIN — Medication 1 DOSE(S): at 10:00

## 2025-04-12 RX ADMIN — IPRATROPIUM BROMIDE AND ALBUTEROL SULFATE 3 MILLILITER(S): .5; 2.5 SOLUTION RESPIRATORY (INHALATION) at 15:20

## 2025-04-12 RX ADMIN — LOSARTAN POTASSIUM 50 MILLIGRAM(S): 100 TABLET, FILM COATED ORAL at 06:16

## 2025-04-12 RX ADMIN — IPRATROPIUM BROMIDE AND ALBUTEROL SULFATE 3 MILLILITER(S): .5; 2.5 SOLUTION RESPIRATORY (INHALATION) at 09:08

## 2025-04-12 RX ADMIN — GABAPENTIN 100 MILLIGRAM(S): 400 CAPSULE ORAL at 06:16

## 2025-04-12 RX ADMIN — Medication 3 MILLIGRAM(S): at 22:22

## 2025-04-12 RX ADMIN — Medication 2 TABLET(S): at 22:21

## 2025-04-12 RX ADMIN — ENOXAPARIN SODIUM 40 MILLIGRAM(S): 100 INJECTION SUBCUTANEOUS at 19:23

## 2025-04-12 RX ADMIN — Medication 1 DOSE(S): at 22:21

## 2025-04-12 RX ADMIN — CLOTRIMAZOLE 1 APPLICATORFUL: 1 CREAM TOPICAL at 22:21

## 2025-04-12 RX ADMIN — ENOXAPARIN SODIUM 40 MILLIGRAM(S): 100 INJECTION SUBCUTANEOUS at 06:17

## 2025-04-12 RX ADMIN — PROMETHAZINE HYDROCHLORIDE 25 MILLIGRAM(S): 25 INJECTION INTRAMUSCULAR; INTRAVENOUS at 19:18

## 2025-04-12 RX ADMIN — Medication 650 MILLIGRAM(S): at 10:30

## 2025-04-12 RX ADMIN — IPRATROPIUM BROMIDE AND ALBUTEROL SULFATE 3 MILLILITER(S): .5; 2.5 SOLUTION RESPIRATORY (INHALATION) at 22:15

## 2025-04-12 RX ADMIN — Medication 650 MILLIGRAM(S): at 10:00

## 2025-04-12 NOTE — PROGRESS NOTE ADULT - PROBLEM SELECTOR PLAN 9
DVT ppx: lovenox  DIET: DASH   DISPO: pending clinical course   plan of care d/w pt at bedside    4/9 - case d/w daughter on the phone , update provided  4/11 : case d/w son at bedside    case d/w ACP and SW DVT ppx: lovenox  DIET: DASH   DISPO: pending clinical course   plan of care d/w pt at bedside    4/9 - case d/w daughter on the phone , update provided  4/11 : case d/w son at bedside    case d/w ACP

## 2025-04-12 NOTE — PROGRESS NOTE ADULT - ASSESSMENT
EKG: SR ST abn +delta wave  Echo < from: TTE W or WO Ultrasound Enhancing Agent (04.09.25 @ 15:12) >   1. Left ventricular cavity is normal in size. Left ventricular systolic function is normal with an ejection fraction of 67 % by Yan's method of disks.   2. There is mild (grade 1) left ventricular diastolic dysfunction.   3. Normal right ventricular cavity size and normal right ventricular systolic function.   4. Left atrium is normal in size.   5. The right atrium is dilated.   6. A a transcatheter deployed (TAVR) is present in theaortic position. The peak transaortic velocity is 2.07 m/s, peak transaortic gradient is 17.1 mmHg and mean transaortic gradient is 10.0 mmHg with an LVOT/aortic valve VTI ratio of 0.66. There is no regurgitation.   7. No pericardial effusion seen.  8. Estimated pulmonary artery systolic pressure is 48 mmHg.   9. The inferior vena cava is normal in size measuring 2.02 cm in diameter, (normal <2.1cm) with abnormal inspiratory collapse (abnormal <50%) consistent with mildly elevated right atrial pressure (~8, range 5-10mmHg).    < end of copied text >      A/P:  84F with hx of TAVR (2019), COPD (on PRN O2), HLD, S1 Fracture (02/2025 no surgical intervention) initially admitted to [facility] 3/31-4/4 for fever, found to have new LLL mass like consolidation, c/f malignancy, infectious workup negative, transferred to [facility] for bronchoscopy with interventional pulmonology. S/p bronchoscopy 4/7. Cardiology consult called for intra-op ST changes on tele.    1. ST changes   - has c/o chest pain with cough  - repeat EKG   - echo normal     2. lung mass  - s/p bronchoscopy 4/7/25 with biopsy of the LLL, EBUS with lymph node biopsy  - f/u interventional pulm    3. hx AS s/p TAVR   - has been asymptomatic    4. HTN  - c/w losartan

## 2025-04-12 NOTE — PROGRESS NOTE ADULT - PROBLEM SELECTOR PLAN 1
LLL noted on imaging  - CT with 2.4cm lung nodule  - IR unable to obtain percutaneous biopsy  - appreciate IP ,s/p navigational bronchoscopy 4/7/25 with transbronchial biopsy of the LLL, EBUS with lymph node biopsies and BAL   post procedure CXR with  no pneumothorax or right pleural effusion.  - BAL negative for infection so far   -  cytopathology noted : Left lower lobe nodule biopsy Positive for malignant cells; immunostains/IHC pending.  Lymph node biopsy: Negative for malignancy.  Interventional pulm f/u appreciated, oncology consulted , rec MRI  head w/wo con LLL noted on imaging  - CT with 2.4cm lung nodule  - IR unable to obtain percutaneous biopsy  - appreciate IP ,s/p navigational bronchoscopy 4/7/25 with transbronchial biopsy of the LLL, EBUS with lymph node biopsies and BAL   post procedure CXR with  no pneumothorax or right pleural effusion.  - BAL negative for infection so far   -  cytopathology noted : Left lower lobe nodule biopsy Positive for malignant cells; immunostains/IHC pending.  Lymph node biopsy: Negative for malignancy.  Interventional pulm f/u appreciated,   -oncology consulted , rec  Recommend MR head w w/o contrast and  bone scan to evaluate for any metastatic disease given recent falls/fracture hx.  , preliminary findings are NOT small cell/neuroendocrine feature, hence can be discharged with outpatient follow up per  oncology , outpt oncology f/u for treatment plans   will plan for Discharge to  rehab after MRI and bone scan done

## 2025-04-12 NOTE — PROGRESS NOTE ADULT - ASSESSMENT
84F with hx of TAVR (2019), COPD (on PRN O2), HLD, S1 Fracture (02/2025 no surgical intervention) initially admitted to [facility] 3/31-4/4 for fever, found to have new LLL mass like consolidation, c/f malignancy, infectious workup negative, transferred to [facility] for bronchoscopy with interventional pulmonology.

## 2025-04-12 NOTE — PROGRESS NOTE ADULT - SUBJECTIVE AND OBJECTIVE BOX
Patient is a 84y old  Female who presents with a chief complaint of transfer from [facility] (09 Apr 2025 09:34)      SUBJECTIVE / OVERNIGHT EVENTS:    MEDICATIONS  (STANDING):  albuterol/ipratropium for Nebulization 3 milliLiter(s) Nebulizer every 6 hours  benzocaine/menthol Lozenge 1 Lozenge Oral two times a day  clotrimazole 1% Vaginal Cream 1 Applicatorful Vaginal at bedtime  enoxaparin Injectable 40 milliGRAM(s) SubCutaneous every 12 hours  fluticasone propionate/ salmeterol 250-50 MICROgram(s) Diskus 1 Dose(s) Inhalation two times a day  gabapentin 100 milliGRAM(s) Oral two times a day  hydrocodone/homatropine Syrup 5 milliLiter(s) Oral daily  lidocaine   4% Patch 1 Patch Transdermal daily  losartan 50 milliGRAM(s) Oral daily  pantoprazole    Tablet 40 milliGRAM(s) Oral before breakfast  polyethylene glycol 3350 17 Gram(s) Oral daily  rosuvastatin 40 milliGRAM(s) Oral at bedtime  senna 2 Tablet(s) Oral at bedtime    MEDICATIONS  (PRN):  acetaminophen     Tablet .. 650 milliGRAM(s) Oral every 6 hours PRN Temp greater or equal to 38C (100.4F), Mild Pain (1 - 3), Moderate Pain (4 - 6)  bismuth subsalicylate Liquid 15 milliLiter(s) Oral every 8 hours PRN ingestion  melatonin 3 milliGRAM(s) Oral at bedtime PRN Insomnia  promethazine 25 milliGRAM(s) Oral every 6 hours PRN allergies      Vital Signs Last 24 Hrs  T(C): 36.9 (12 Apr 2025 07:14), Max: 37.1 (11 Apr 2025 21:12)  T(F): 98.4 (12 Apr 2025 07:14), Max: 98.8 (11 Apr 2025 21:12)  HR: 91 (12 Apr 2025 07:14) (80 - 96)  BP: 159/54 (12 Apr 2025 07:14) (133/56 - 159/54)  BP(mean): --  RR: 18 (12 Apr 2025 07:14) (17 - 18)  SpO2: 97% (12 Apr 2025 07:14) (93% - 100%)    Parameters below as of 12 Apr 2025 07:14  Patient On (Oxygen Delivery Method): nasal cannula  O2 Flow (L/min): 3    CAPILLARY BLOOD GLUCOSE        I&O's Summary    11 Apr 2025 07:01  -  12 Apr 2025 07:00  --------------------------------------------------------  IN: 150 mL / OUT: 1000 mL / NET: -850 mL        PHYSICAL EXAM:  GENERAL: NAD, well-developed  HEAD:  Atraumatic, Normocephalic  EYES: EOMI, PERRLA, conjunctiva and sclera clear  NECK: Supple, No JVD  CHEST/LUNG: Clear to auscultation bilaterally; No wheeze  HEART: Regular rate and rhythm; No murmurs, rubs, or gallops  ABDOMEN: Soft, Nontender, Nondistended; Bowel sounds present  EXTREMITIES:  2+ Peripheral Pulses, No clubbing, cyanosis, or edema  PSYCH: AAOx3  NEUROLOGY: non-focal  SKIN: No rashes or lesions    LABS:                        8.9    23.10 )-----------( 579      ( 12 Apr 2025 06:01 )             30.0     04-12    143  |  102  |  8   ----------------------------<  112[H]  4.0   |  29  |  0.73    Ca    8.9      12 Apr 2025 06:01  Phos  3.6     04-12  Mg     1.80     04-12    TPro  6.1  /  Alb  2.9[L]  /  TBili  0.3  /  DBili  x   /  AST  14  /  ALT  15  /  AlkPhos  59  04-11          Urinalysis Basic - ( 12 Apr 2025 06:01 )    Color: x / Appearance: x / SG: x / pH: x  Gluc: 112 mg/dL / Ketone: x  / Bili: x / Urobili: x   Blood: x / Protein: x / Nitrite: x   Leuk Esterase: x / RBC: x / WBC x   Sq Epi: x / Non Sq Epi: x / Bacteria: x        RADIOLOGY & ADDITIONAL TESTS:    Imaging Personally Reviewed:    Consultant(s) Notes Reviewed:      Care Discussed with Consultants/Other Providers:   Patient is a 84y old  Female who presents with a chief complaint of transfer from [facility] (09 Apr 2025 09:34)      SUBJECTIVE / OVERNIGHT EVENTS: patient seen and examined by bedside, pt still c/o cough, no acute events over night        MEDICATIONS  (STANDING):  albuterol/ipratropium for Nebulization 3 milliLiter(s) Nebulizer every 6 hours  benzocaine/menthol Lozenge 1 Lozenge Oral two times a day  clotrimazole 1% Vaginal Cream 1 Applicatorful Vaginal at bedtime  enoxaparin Injectable 40 milliGRAM(s) SubCutaneous every 12 hours  fluticasone propionate/ salmeterol 250-50 MICROgram(s) Diskus 1 Dose(s) Inhalation two times a day  gabapentin 100 milliGRAM(s) Oral two times a day  hydrocodone/homatropine Syrup 5 milliLiter(s) Oral daily  lidocaine   4% Patch 1 Patch Transdermal daily  losartan 50 milliGRAM(s) Oral daily  pantoprazole    Tablet 40 milliGRAM(s) Oral before breakfast  polyethylene glycol 3350 17 Gram(s) Oral daily  rosuvastatin 40 milliGRAM(s) Oral at bedtime  senna 2 Tablet(s) Oral at bedtime    MEDICATIONS  (PRN):  acetaminophen     Tablet .. 650 milliGRAM(s) Oral every 6 hours PRN Temp greater or equal to 38C (100.4F), Mild Pain (1 - 3), Moderate Pain (4 - 6)  bismuth subsalicylate Liquid 15 milliLiter(s) Oral every 8 hours PRN ingestion  melatonin 3 milliGRAM(s) Oral at bedtime PRN Insomnia  promethazine 25 milliGRAM(s) Oral every 6 hours PRN allergies      Vital Signs Last 24 Hrs  T(C): 36.9 (12 Apr 2025 07:14), Max: 37.1 (11 Apr 2025 21:12)  T(F): 98.4 (12 Apr 2025 07:14), Max: 98.8 (11 Apr 2025 21:12)  HR: 91 (12 Apr 2025 07:14) (80 - 96)  BP: 159/54 (12 Apr 2025 07:14) (133/56 - 159/54)  BP(mean): --  RR: 18 (12 Apr 2025 07:14) (17 - 18)  SpO2: 97% (12 Apr 2025 07:14) (93% - 100%)    Parameters below as of 12 Apr 2025 07:14  Patient On (Oxygen Delivery Method): nasal cannula  O2 Flow (L/min): 3    CAPILLARY BLOOD GLUCOSE        I&O's Summary    11 Apr 2025 07:01  -  12 Apr 2025 07:00  --------------------------------------------------------  IN: 150 mL / OUT: 1000 mL / NET: -850 mL      PHYSICAL EXAM:  CONSTITUTIONAL: NAD,  EYES: PERRLA; conjunctiva and sclera clear  NECK: Supple, no palpable masses;  RESPIRATORY: Normal respiratory effort; basilar crackles  bilaterally  CARDIOVASCULAR: Regular rate and rhythm, normal S1 and S2, no murmur/rub/gallop;   ABDOMEN: Nontender to palpation, normoactive bowel sounds, no rebound/guarding;   MUSCULOSKELETAL:    no clubbing or cyanosis of digits; no joint swelling or tenderness to palpation  PSYCH: A+O to person, place, and time; affect appropriate  NEUROLOGY: CN 2-12 are intact and symmetric; no gross sensory deficits;           LABS:                        8.9    23.10 )-----------( 579      ( 12 Apr 2025 06:01 )             30.0     04-12    143  |  102  |  8   ----------------------------<  112[H]  4.0   |  29  |  0.73    Ca    8.9      12 Apr 2025 06:01  Phos  3.6     04-12  Mg     1.80     04-12    TPro  6.1  /  Alb  2.9[L]  /  TBili  0.3  /  DBili  x   /  AST  14  /  ALT  15  /  AlkPhos  59  04-11          Urinalysis Basic - ( 12 Apr 2025 06:01 )    Color: x / Appearance: x / SG: x / pH: x  Gluc: 112 mg/dL / Ketone: x  / Bili: x / Urobili: x   Blood: x / Protein: x / Nitrite: x   Leuk Esterase: x / RBC: x / WBC x   Sq Epi: x / Non Sq Epi: x / Bacteria: x        RADIOLOGY & ADDITIONAL TESTS:    Imaging Personally Reviewed:    Consultant(s) Notes Reviewed:  heme/onc     Care Discussed with Consultants/Other Providers:

## 2025-04-12 NOTE — PROGRESS NOTE ADULT - PROBLEM SELECTOR PLAN 5
WBC 26 today  (was 65 on admission)  - infectious workup unrevealing - BCx/UA/CXR neg, QuantiFERON-TB Gold, fungal and viral serology pending  - ID evaluated at [facility] - empric zosyn x 7 days, rec flow cytometry, AFB, bacterial and fungal cx to be sent with biopsy  pt had worsening leucocytosis  , per daughter  her baseline is 14-15  - pt with  EBV IGM+ but no PCR detected ,  -ID f/u appreciated, leucocytosis likely reactive vs 2/2 malignancy  - ID rec to monitor off abx , will dc zosyn as pt completed 7 days of empiric treatment  - trend WBC

## 2025-04-12 NOTE — PROGRESS NOTE ADULT - PROBLEM SELECTOR PLAN 2
febrile on admission to [facility]  - infectious workup unrevealing - BCx/UA/CXR neg, QuantiFERON-TB Gold, fungal and viral serology pending  - CT chest with LLL lung nodule - management as below, CT CAP without infectious etiology   - ID (Dr. Bui) evaluated at [facility] - rec flow cytometry, AFB, bacterial and fungal cx to be sent with biopsy, , , BAL negative for infection at this time , will f/u cytopathology   -on  Zosyn (planned for empiric 7 day course per ID at [facility]) -   -pt had worsening leucocytosis  ,now improving ,  per daughter  her baseline is 14-15  - pt with  EBV IGM+ but no PCR detected ,  -ID f/u appreciated, leucocytosis likely reactive vs 2/2 malignancy  - ID rec to monitor off abx , Dced zosyn as pt completed 7 days of empiric treatment

## 2025-04-12 NOTE — PROGRESS NOTE ADULT - SUBJECTIVE AND OBJECTIVE BOX
Cong Marcum MD  Interventional Cardiology / Endovascular Specialist  [place] Office : [address] [place] [address] [place] [address]  Tel:   [place] Office : [address] [facility] [place] [address]  Tel: [phone]    patient laying in bed, NAD  	  MEDICATIONS:  enoxaparin Injectable 40 milliGRAM(s) SubCutaneous every 12 hours  losartan 50 milliGRAM(s) Oral daily      albuterol/ipratropium for Nebulization 3 milliLiter(s) Nebulizer every 6 hours  fluticasone propionate/ salmeterol 250-50 MICROgram(s) Diskus 1 Dose(s) Inhalation two times a day  hydrocodone/homatropine Syrup 5 milliLiter(s) Oral daily    acetaminophen     Tablet .. 650 milliGRAM(s) Oral every 6 hours PRN  gabapentin 100 milliGRAM(s) Oral two times a day  melatonin 3 milliGRAM(s) Oral at bedtime PRN  promethazine 25 milliGRAM(s) Oral every 6 hours PRN    bismuth subsalicylate Liquid 15 milliLiter(s) Oral every 8 hours PRN  pantoprazole    Tablet 40 milliGRAM(s) Oral before breakfast  polyethylene glycol 3350 17 Gram(s) Oral daily  senna 2 Tablet(s) Oral at bedtime    rosuvastatin 40 milliGRAM(s) Oral at bedtime    benzocaine/menthol Lozenge 1 Lozenge Oral two times a day  clotrimazole 1% Vaginal Cream 1 Applicatorful Vaginal at bedtime  lidocaine   4% Patch 1 Patch Transdermal daily      PAST MEDICAL/SURGICAL HISTORY  PAST MEDICAL & SURGICAL HISTORY:  HTN (hypertension)      COPD (chronic obstructive pulmonary disease)      HLD (hyperlipidemia)      S/P TAVR (transcatheter aortic valve replacement)          SOCIAL HISTORY: Substance Use (street drugs): ( x ) never used  (  ) other:    FAMILY HISTORY:        PHYSICAL EXAM:  T(C): 36.4 (04-12-25 @ 10:35), Max: 37.1 (04-11-25 @ 21:12)  HR: 88 (04-12-25 @ 10:35) (87 - 96)  BP: 115/52 (04-12-25 @ 10:35) (115/52 - 159/54)  RR: 17 (04-12-25 @ 10:35) (17 - 18)  SpO2: 96% (04-12-25 @ 10:35) (93% - 100%)  Wt(kg): --  I&O's Summary    11 Apr 2025 07:01  -  12 Apr 2025 07:00  --------------------------------------------------------  IN: 150 mL / OUT: 1000 mL / NET: -850 mL        GENERAL: NAD  EYES:  PERRLA  Cardiovascular: Normal S1 S2, No JVD, No murmurs, No edema  Respiratory: Lungs clear to auscultation	  Gastrointestinal:  Soft, Non-tender, + BS	  Extremities: Normal range of motion, No clubbing, cyanosis or edema  NERVOUS SYSTEM:  Alert & Oriented X3                                  8.9    23.10 )-----------( 579      ( 12 Apr 2025 06:01 )             30.0     04-12    143  |  102  |  8   ----------------------------<  112[H]  4.0   |  29  |  0.73    Ca    8.9      12 Apr 2025 06:01  Phos  3.6     04-12  Mg     1.80     04-12    TPro  6.1  /  Alb  2.9[L]  /  TBili  0.3  /  DBili  x   /  AST  14  /  ALT  15  /  AlkPhos  59  04-11    proBNP:   Lipid Profile:   HgA1c:   TSH:     Consultant(s) Notes Reviewed:  [x ] YES  [ ] NO    Care Discussed with Consultants/Other Providers [ x] YES  [ ] NO    Imaging Personally Reviewed independently:  [x] YES  [ ] NO    All labs, radiologic studies, vitals, orders and medications list reviewed. Patient is seen and examined at bedside. Case discussed with medical team.

## 2025-04-13 LAB
ALBUMIN SERPL ELPH-MCNC: 3.2 G/DL — LOW (ref 3.3–5)
ALP SERPL-CCNC: 62 U/L — SIGNIFICANT CHANGE UP (ref 40–120)
ALT FLD-CCNC: 9 U/L — SIGNIFICANT CHANGE UP (ref 4–33)
ANION GAP SERPL CALC-SCNC: 11 MMOL/L — SIGNIFICANT CHANGE UP (ref 7–14)
ANION GAP SERPL CALC-SCNC: 14 MMOL/L — SIGNIFICANT CHANGE UP (ref 7–14)
AST SERPL-CCNC: 14 U/L — SIGNIFICANT CHANGE UP (ref 4–32)
BASOPHILS # BLD AUTO: 0.18 K/UL — SIGNIFICANT CHANGE UP (ref 0–0.2)
BASOPHILS NFR BLD AUTO: 1 % — SIGNIFICANT CHANGE UP (ref 0–2)
BILIRUB SERPL-MCNC: 0.5 MG/DL — SIGNIFICANT CHANGE UP (ref 0.2–1.2)
BUN SERPL-MCNC: 8 MG/DL — SIGNIFICANT CHANGE UP (ref 7–23)
BUN SERPL-MCNC: 9 MG/DL — SIGNIFICANT CHANGE UP (ref 7–23)
CALCIUM SERPL-MCNC: 8.6 MG/DL — SIGNIFICANT CHANGE UP (ref 8.4–10.5)
CALCIUM SERPL-MCNC: 8.6 MG/DL — SIGNIFICANT CHANGE UP (ref 8.4–10.5)
CHLORIDE SERPL-SCNC: 100 MMOL/L — SIGNIFICANT CHANGE UP (ref 98–107)
CHLORIDE SERPL-SCNC: 103 MMOL/L — SIGNIFICANT CHANGE UP (ref 98–107)
CO2 SERPL-SCNC: 27 MMOL/L — SIGNIFICANT CHANGE UP (ref 22–31)
CO2 SERPL-SCNC: 30 MMOL/L — SIGNIFICANT CHANGE UP (ref 22–31)
CREAT SERPL-MCNC: 0.79 MG/DL — SIGNIFICANT CHANGE UP (ref 0.5–1.3)
CREAT SERPL-MCNC: 0.79 MG/DL — SIGNIFICANT CHANGE UP (ref 0.5–1.3)
EGFR: 74 ML/MIN/1.73M2 — SIGNIFICANT CHANGE UP
EOSINOPHIL # BLD AUTO: 0.1 K/UL — SIGNIFICANT CHANGE UP (ref 0–0.5)
EOSINOPHIL NFR BLD AUTO: 0.6 % — SIGNIFICANT CHANGE UP (ref 0–6)
FLUAV AG NPH QL: SIGNIFICANT CHANGE UP
FLUBV AG NPH QL: SIGNIFICANT CHANGE UP
GLUCOSE SERPL-MCNC: 103 MG/DL — HIGH (ref 70–99)
GLUCOSE SERPL-MCNC: 93 MG/DL — SIGNIFICANT CHANGE UP (ref 70–99)
HCT VFR BLD CALC: 27.7 % — LOW (ref 34.5–45)
HCT VFR BLD CALC: 28.1 % — LOW (ref 34.5–45)
HGB BLD-MCNC: 8.2 G/DL — LOW (ref 11.5–15.5)
HGB BLD-MCNC: 8.3 G/DL — LOW (ref 11.5–15.5)
IANC: 10.37 K/UL — HIGH (ref 1.8–7.4)
IMM GRANULOCYTES NFR BLD AUTO: 9 % — HIGH (ref 0–0.9)
LACTATE SERPL-SCNC: 1.4 MMOL/L — SIGNIFICANT CHANGE UP (ref 0.5–2)
LYMPHOCYTES # BLD AUTO: 1.43 K/UL — SIGNIFICANT CHANGE UP (ref 1–3.3)
LYMPHOCYTES # BLD AUTO: 8.3 % — LOW (ref 13–44)
MAGNESIUM SERPL-MCNC: 1.9 MG/DL — SIGNIFICANT CHANGE UP (ref 1.6–2.6)
MCHC RBC-ENTMCNC: 16.5 PG — LOW (ref 27–34)
MCHC RBC-ENTMCNC: 16.8 PG — LOW (ref 27–34)
MCHC RBC-ENTMCNC: 29.5 G/DL — LOW (ref 32–36)
MCHC RBC-ENTMCNC: 29.6 G/DL — LOW (ref 32–36)
MCV RBC AUTO: 55.9 FL — LOW (ref 80–100)
MCV RBC AUTO: 56.6 FL — LOW (ref 80–100)
MONOCYTES # BLD AUTO: 3.64 K/UL — HIGH (ref 0–0.9)
MONOCYTES NFR BLD AUTO: 21.1 % — HIGH (ref 2–14)
NEUTROPHILS # BLD AUTO: 10.37 K/UL — HIGH (ref 1.8–7.4)
NEUTROPHILS NFR BLD AUTO: 60 % — SIGNIFICANT CHANGE UP (ref 43–77)
NRBC # BLD AUTO: 0 K/UL — SIGNIFICANT CHANGE UP (ref 0–0)
NRBC # BLD AUTO: 0 K/UL — SIGNIFICANT CHANGE UP (ref 0–0)
NRBC # FLD: 0 K/UL — SIGNIFICANT CHANGE UP (ref 0–0)
NRBC # FLD: 0 K/UL — SIGNIFICANT CHANGE UP (ref 0–0)
NRBC BLD AUTO-RTO: 0 /100 WBCS — SIGNIFICANT CHANGE UP (ref 0–0)
NRBC BLD AUTO-RTO: 0 /100 WBCS — SIGNIFICANT CHANGE UP (ref 0–0)
PHOSPHATE SERPL-MCNC: 4.3 MG/DL — SIGNIFICANT CHANGE UP (ref 2.5–4.5)
PLATELET # BLD AUTO: 471 K/UL — HIGH (ref 150–400)
PLATELET # BLD AUTO: 532 K/UL — HIGH (ref 150–400)
POTASSIUM SERPL-MCNC: 3.5 MMOL/L — SIGNIFICANT CHANGE UP (ref 3.5–5.3)
POTASSIUM SERPL-MCNC: 4 MMOL/L — SIGNIFICANT CHANGE UP (ref 3.5–5.3)
POTASSIUM SERPL-SCNC: 3.5 MMOL/L — SIGNIFICANT CHANGE UP (ref 3.5–5.3)
POTASSIUM SERPL-SCNC: 4 MMOL/L — SIGNIFICANT CHANGE UP (ref 3.5–5.3)
PROCALCITONIN SERPL-MCNC: 0.15 NG/ML — HIGH (ref 0.02–0.1)
PROT SERPL-MCNC: 6.6 G/DL — SIGNIFICANT CHANGE UP (ref 6–8.3)
RBC # BLD: 4.89 M/UL — SIGNIFICANT CHANGE UP (ref 3.8–5.2)
RBC # BLD: 5.03 M/UL — SIGNIFICANT CHANGE UP (ref 3.8–5.2)
RBC # FLD: 20.9 % — HIGH (ref 10.3–14.5)
RBC # FLD: 21.2 % — HIGH (ref 10.3–14.5)
RSV RNA NPH QL NAA+NON-PROBE: SIGNIFICANT CHANGE UP
SARS-COV-2 RNA SPEC QL NAA+PROBE: SIGNIFICANT CHANGE UP
SODIUM SERPL-SCNC: 141 MMOL/L — SIGNIFICANT CHANGE UP (ref 135–145)
SODIUM SERPL-SCNC: 144 MMOL/L — SIGNIFICANT CHANGE UP (ref 135–145)
SOURCE RESPIRATORY: SIGNIFICANT CHANGE UP
WBC # BLD: 17.27 K/UL — HIGH (ref 3.8–10.5)
WBC # BLD: 23.21 K/UL — HIGH (ref 3.8–10.5)
WBC # FLD AUTO: 17.27 K/UL — HIGH (ref 3.8–10.5)
WBC # FLD AUTO: 23.21 K/UL — HIGH (ref 3.8–10.5)

## 2025-04-13 PROCEDURE — 99232 SBSQ HOSP IP/OBS MODERATE 35: CPT

## 2025-04-13 PROCEDURE — 71045 X-RAY EXAM CHEST 1 VIEW: CPT | Mod: 26

## 2025-04-13 RX ORDER — VANCOMYCIN HCL IN 5 % DEXTROSE 1.5G/250ML
1250 PLASTIC BAG, INJECTION (ML) INTRAVENOUS ONCE
Refills: 0 | Status: COMPLETED | OUTPATIENT
Start: 2025-04-13 | End: 2025-04-13

## 2025-04-13 RX ORDER — ACETAMINOPHEN 500 MG/5ML
1000 LIQUID (ML) ORAL ONCE
Refills: 0 | Status: COMPLETED | OUTPATIENT
Start: 2025-04-13 | End: 2025-04-13

## 2025-04-13 RX ORDER — PIPERACILLIN-TAZO-DEXTROSE,ISO 2.25G/50ML
3.38 IV SOLUTION, PIGGYBACK PREMIX FROZEN(ML) INTRAVENOUS ONCE
Refills: 0 | Status: COMPLETED | OUTPATIENT
Start: 2025-04-13 | End: 2025-04-13

## 2025-04-13 RX ADMIN — ROSUVASTATIN CALCIUM 40 MILLIGRAM(S): 20 TABLET, FILM COATED ORAL at 21:57

## 2025-04-13 RX ADMIN — IPRATROPIUM BROMIDE AND ALBUTEROL SULFATE 3 MILLILITER(S): .5; 2.5 SOLUTION RESPIRATORY (INHALATION) at 16:06

## 2025-04-13 RX ADMIN — ENOXAPARIN SODIUM 40 MILLIGRAM(S): 100 INJECTION SUBCUTANEOUS at 06:22

## 2025-04-13 RX ADMIN — Medication 1 LOZENGE: at 19:09

## 2025-04-13 RX ADMIN — GABAPENTIN 100 MILLIGRAM(S): 400 CAPSULE ORAL at 06:22

## 2025-04-13 RX ADMIN — LOSARTAN POTASSIUM 50 MILLIGRAM(S): 100 TABLET, FILM COATED ORAL at 06:22

## 2025-04-13 RX ADMIN — Medication 1 DOSE(S): at 21:57

## 2025-04-13 RX ADMIN — IPRATROPIUM BROMIDE AND ALBUTEROL SULFATE 3 MILLILITER(S): .5; 2.5 SOLUTION RESPIRATORY (INHALATION) at 10:06

## 2025-04-13 RX ADMIN — IPRATROPIUM BROMIDE AND ALBUTEROL SULFATE 3 MILLILITER(S): .5; 2.5 SOLUTION RESPIRATORY (INHALATION) at 03:47

## 2025-04-13 RX ADMIN — IPRATROPIUM BROMIDE AND ALBUTEROL SULFATE 3 MILLILITER(S): .5; 2.5 SOLUTION RESPIRATORY (INHALATION) at 20:34

## 2025-04-13 RX ADMIN — POLYETHYLENE GLYCOL 3350 17 GRAM(S): 17 POWDER, FOR SOLUTION ORAL at 12:26

## 2025-04-13 RX ADMIN — ENOXAPARIN SODIUM 40 MILLIGRAM(S): 100 INJECTION SUBCUTANEOUS at 18:23

## 2025-04-13 RX ADMIN — Medication 200 GRAM(S): at 21:15

## 2025-04-13 RX ADMIN — Medication 1 LOZENGE: at 06:22

## 2025-04-13 RX ADMIN — Medication 166.67 MILLIGRAM(S): at 21:57

## 2025-04-13 RX ADMIN — Medication 400 MILLIGRAM(S): at 19:40

## 2025-04-13 RX ADMIN — Medication 1 DOSE(S): at 12:27

## 2025-04-13 RX ADMIN — Medication 40 MILLIGRAM(S): at 06:22

## 2025-04-13 NOTE — PROGRESS NOTE ADULT - PROBLEM SELECTOR PLAN 2
febrile on admission to [facility]  - infectious workup unrevealing - BCx/UA/CXR neg, QuantiFERON-TB Gold, fungal and viral serology pending  - CT chest with LLL lung nodule - management as below, CT CAP without infectious etiology   - ID (Dr. Bui) evaluated at [facility] - rec flow cytometry, AFB, bacterial and fungal cx to be sent with biopsy, , , BAL negative for infection at this time , will f/u cytopathology   -on  Zosyn (planned for empiric 7 day course per ID at [facility]) -   -pt had worsening leucocytosis  ,now improving ,  per daughter  her baseline is 14-15  - pt with  EBV IGM+ but no PCR detected ,  -ID f/u appreciated, leucocytosis likely reactive vs 2/2 malignancy  - ID rec to monitor off abx , Dced zosyn as pt completed 7 days of empiric treatment

## 2025-04-13 NOTE — PROGRESS NOTE ADULT - PROBLEM SELECTOR PLAN 1
LLL noted on imaging  - CT with 2.4cm lung nodule  - IR unable to obtain percutaneous biopsy  - appreciate IP ,s/p navigational bronchoscopy 4/7/25 with transbronchial biopsy of the LLL, EBUS with lymph node biopsies and BAL   post procedure CXR with  no pneumothorax or right pleural effusion.  - BAL negative for infection so far   -  cytopathology noted : Left lower lobe nodule biopsy Positive for malignant cells; immunostains/IHC pending.  Lymph node biopsy: Negative for malignancy.  Interventional pulm f/u appreciated,   -oncology consulted , rec  Recommend MR head w w/o contrast and  bone scan to evaluate for any metastatic disease given recent falls/fracture hx.  , preliminary findings are NOT small cell/neuroendocrine feature, hence can be discharged with outpatient follow up per  oncology , outpt oncology f/u for treatment plans   will plan for Discharge to  rehab after MRI and bone scan done LLL noted on imaging  - CT with 2.4cm lung nodule  - IR unable to obtain percutaneous biopsy  - appreciate IP ,s/p navigational bronchoscopy 4/7/25 with transbronchial biopsy of the LLL, EBUS with lymph node biopsies and BAL   post procedure CXR with  no pneumothorax or right pleural effusion.  - BAL negative for infection so far   -  cytopathology noted : Left lower lobe nodule biopsy Positive for malignant cells; immunostains/IHC pending.  Lymph node biopsy: Negative for malignancy.  Interventional pulm f/u appreciated,   -oncology consulted , rec  Recommend MR head w w/o contrast and  bone scan to evaluate for any metastatic disease given recent falls/fracture hx. Bone scan negative for mets   , preliminary findings are NOT small cell/neuroendocrine feature, hence can be discharged with outpatient follow up per  oncology , outpt oncology f/u for treatment plans   will plan for Discharge to  rehab if  MRI unremarkable

## 2025-04-13 NOTE — DISCHARGE NOTE PROVIDER - HOSPITAL COURSE
84F with hx of TAVR (2019), COPD (on PRN O2), HLD, S1 Fracture (02/2025 no surgical intervention) initially admitted to [facility] 3/31-4/4 for fever, found to have new LLL mass like consolidation, c/f malignancy, infectious workup negative, transferred to [facility] for bronchoscopy with interventional pulmonology.      Lung nodule.    LLL noted on imaging  - CT with 2.4cm lung nodule  - IR unable to obtain percutaneous biopsy  - appreciate IP ,s/p navigational bronchoscopy 4/7/25 with transbronchial biopsy of the LLL, EBUS with lymph node biopsies and BAL   post procedure CXR with  no pneumothorax or right pleural effusion.  - BAL negative for infection so far   -  cytopathology noted : Left lower lobe nodule biopsy Positive for malignant cells; immunostains/IHC pending.  Lymph node biopsy: Negative for malignancy.  Interventional pulm f/u appreciated,   -oncology consulted , rec  Recommend MR head w w/o contrast and  bone scan to evaluate for any metastatic disease given recent falls/fracture hx. Bone scan negative for mets   , preliminary findings are NOT small cell/neuroendocrine feature, hence can be discharged with outpatient follow up per  oncology , outpt oncology f/u for treatment plans   will plan for Discharge to  rehab if  MRI unremarkable.     Sepsis.   ·  Plan: febrile on admission to [facility]  - infectious workup unrevealing - BCx/UA/CXR neg, QuantiFERON-TB Gold, fungal and viral serology pending  - CT chest with LLL lung nodule - management as below, CT CAP without infectious etiology   - ID (Dr. Bui) evaluated at [facility] - rec flow cytometry, AFB, bacterial and fungal cx to be sent with biopsy, BAL negative for infection at this time , will f/u cytopathology   -on  Zosyn (planned for empiric 7 day course per ID at [facility]) -   -pt had worsening leucocytosis  ,now improving ,  per daughter  her baseline is 14-15  - pt with  EBV IGM+ but no PCR detected ,  -ID f/u appreciated, leucocytosis likely reactive vs 2/2 malignancy  - ID rec to monitor off abx , Dced zosyn as pt completed 7 days of empiric treatment.     Anemia.   ·  Plan: Anemia profile noted from 4/2/25 , low serum iron and TIBC, consistent with AOCD with iron deficiency   Hb 8.9-->7.2 , maybe dilutional as pt received IVF   repeat cbc with  hb 8.2 , will CTM    will transfuse for HB < 7.     Abnormal heart rhythm.   ·  Plan: pt  noted on intraoperative telemetry with abnormal ST segments,   pt reports  CP with coughing , unlikely cardiac as pain reproducible    cardiology eval appreciated   TTE noted as above, . Left ventricular cavity is normal in size. Left ventricular systolic function is normal with an ejection fraction of 67 % ,  There is mild (grade 1) left ventricular diastolic dysfunction.  . Normal right ventricular cavity size and normal right ventricular systolic function.     Leukocytosis.   ·  Plan: WBC 26 today  (was 65 on admission)  - infectious workup unrevealing - BCx/UA/CXR neg, QuantiFERON-TB Gold, fungal and viral serology pending  - ID evaluated at [facility] - empric zosyn x 7 days, rec flow cytometry, AFB, bacterial and fungal cx to be sent with biopsy  pt had worsening leucocytosis  , per daughter  her baseline is 14-15  - pt with  EBV IGM+ but no PCR detected ,  -ID f/u appreciated, leucocytosis likely reactive vs 2/2 malignancy  - ID rec to monitor off abx , will dc zosyn as pt completed 7 days of empiric treatment  - trend WBC.     COPD without exacerbation.   ·  Plan: respiratory status stable  - continue advair BID, duoneb q6h   will dc Guafenesin   will change to Hycodan as pt says it helps her.    Hypertension.   ·  Plan: pt was noted to be hypotensive 4/7, , now resolved   - on  losartan 50 mg to be held for SBP<110   - monitor vital signs.     Fall.   s/p recent fall  - CT spine with s1 age indeterminant fx   - Tylenol/Tramadol PRN, lidocaine patch  - PT eval- NIKHIL.                84F with hx of TAVR (2019), COPD (on PRN O2), HLD, S1 Fracture (02/2025 no surgical intervention) initially admitted to [facility] 3/31-4/4 for fever, found to have new LLL mass like consolidation, transferred to [facility] for bronchoscopy with interventional pulmonology- path consistent with SCC, negative bone scan, MRI brain limited study but no definitive evidence of metastatic disease, hosp. course complicated by HAP, now on cefepime (4/14- 4/21), comfortable on NC 2L. No further inpatient heme/onc work-up is planned during this hospitalization.  Pt is planned to be discharged to [place] on Monday.      Problem/Plan - 2:  ·  Problem: Lung nodule.   ·  Plan: LLL noted on imaging  - CT with 2.4cm lung nodule  - IR unable to obtain percutaneous biopsy  - s/p navigational bronchoscopy  by  on 4/7/25 with transbronchial biopsy of the LLL, EBUS with lymph node biopsies and BAL. 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 BAL neg for infection. Lymph node biopsy: Negative for malignancy.  -oncology consulted, rec MR head w w/o contrast and bone scan to evaluate for any metastatic disease given recent falls/fracture hx.   Bone scan negative for mets   MRI brain limited study but no definitive evidence of metastatic disease. No further inpatient heme/onc work-up is planned during this hospitalization.  Pending NIKHIL placement  PET/CT can be done outpatient to guide therapy options. Outpatient f/u with oncology will be provided prior to discharge.      Problem/Plan - 1:  ·  Problem: Hospital acquired PNA.   ·  Plan: Pt developed new fever, infectious work-up revealed new L lung opacities. Pt started on Vanc and Cefepime for HAP. - cont. cefepime. Last dose Monday. (4/14- 4/21)  - MRSA swab - neg. DC vanc.  - tylenol prn for fever  - monitor resp.status. - currently on RA.  Patient's baseline home O2 requirement is 2L NC prn.    4/19-4/20: WBCs mildlly uptrending -  baseline ~14- 15 (as stated above). However, pt is clinically improving on Cefepime and does not show any signs of new infectious process. Will monitor for now.      Problem/Plan - 3:  ·  Problem: COPD without exacerbation.   ·  Plan: respiratory status stable, on home O2 2L NC prn  - continue advair BID, duoneb q6h  dc Guafenesin, cont Hycodan as pt says it helps her.    Problem/Plan - 4:  ·  Problem: Anemia.   ·  Plan: Anemia profile noted from 4/2/25 , low serum iron and TIBC, consistent with AOCD with iron deficiency   Hb 8.9-->7.2-->7.8 -->7.8   - CTM   - transfuse for HB < 7.    Problem/Plan - 5:  ·  Problem: Hypertension.   ·  Plan: - cont.  losartan 50 mg to be held for SBP<110   - monitor vital signs.    Problem/Plan - 6:  ·  Problem: Abnormal heart rhythm.   ·  Plan: pt  noted on intraoperative telemetry with abnormal ST segments,   pt reports  CP with coughing , unlikely cardiac as pain reproducible    cardiology eval appreciated   TTE noted as above, . Left ventricular cavity is normal in size. Left ventricular systolic function is normal with an ejection fraction of 67 % ,  There is mild (grade 1) left ventricular diastolic dysfunction.  Normal right ventricular cavity size and normal right ventricular systolic function.    Problem/Plan - 7:  ·  Problem: Sepsis.   ·  Plan: febrile on admission to [facility]   NOW RESOLVED  - infectious workup unrevealing - BCx/UA/CXR neg, QuantiFERON-TB Gold, fungal and viral serology pending  - CT chest with LLL lung nodule - management as below, CT CAP without infectious etiology   - ID (Dr. Bui) evaluated at [facility] - rec flow cytometry, AFB, bacterial and fungal cx to be sent with biopsy. BAL negative for infection at this time , will f/u cytopathology   -on  Zosyn (planned for empiric 7 day course per ID at [facility]) -   -pt had worsening leucocytosis  ,now improving ,  per daughter  her baseline is 14-15  - pt with  EBV IGM+ but no PCR detected ,  -ID f/u appreciated, leucocytosis likely reactive vs 2/2 malignancy  - ID rec to monitor off abx , Dced zosyn as pt completed 7 days of empiric treatment.    Problem/Plan - 8:  ·  Problem: Leukocytosis.   ·  Plan: improving (was 65 on admission)  - infectious workup unrevealing - BCx/UA/CXR neg, QuantiFERON-TB Gold, fungal and viral serology pending  - ID evaluated at [facility] - empric zosyn x 7 days, rec flow cytometry, AFB, bacterial and fungal cx to be sent with biopsy  pt had worsening leucocytosis  , per daughter  her baseline is 14-15  - pt with  EBV IGM+ but no PCR detected ,  - ID f/u appreciated, leucocytosis likely reactive vs 2/2 malignancy  - ID rec to monitor off abx , will dc zosyn as pt completed 7 days of empiric treatment  - trend WBC      Problem/Plan - 9:  ·  Problem: Fall.   ·  Plan: s/p recent fall  - CT spine with s1 age indeterminant fx   - Tylenol/Tramadol PRN, lidocaine patch  - PT eval- NIKHIL.         84F with hx of TAVR (2019), COPD (on PRN O2), HLD, S1 Fracture (02/2025 no surgical intervention) initially admitted to [facility] 3/31-4/4 for fever, found to have new LLL mass like consolidation, transferred to [facility] for bronchoscopy with interventional pulmonology- path consistent with SCC, negative bone scan, MRI brain limited study but no definitive evidence of metastatic disease, hosp. course complicated by HAP, s/p cefepime (4/14- 4/21), comfortable on NC 2L. No further inpatient heme/onc work-up is planned during this hospitalization.  Pt is stable for discharge.      Problem/Plan - 2:  ·  Problem: Lung nodule.   ·  Plan: LLL noted on imaging  - CT with 2.4cm lung nodule  - IR unable to obtain percutaneous biopsy  - s/p navigational bronchoscopy  by IP on 4/7/25 with transbronchial biopsy of the LLL, EBUS with lymph node biopsies and BAL. 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 BAL neg for infection. Lymph node biopsy: Negative for malignancy.  -oncology consulted  Bone scan negative for mets   MRI brain limited study but no definitive evidence of metastatic disease. No further inpatient heme/onc work-up is planned during this hospitalization.  Pending NIKHIL placement  PET/CT can be done outpatient to guide therapy options. Outpatient f/u with oncology will be provided prior to discharge.  WBC still elevated, likely 2/2 malignancy . pt is clinically improving on Cefepime and does not show any signs of new infectious process. okay to be discharged off further antibiotics    Problem/Plan - 1:  ·  Problem: Hospital acquired PNA.   ·  Plan: Pt developed new fever, infectious work-up revealed new L lung opacities. Pt started on Vanc and Cefepime for HAP. - s/p cefepime 4/14- 4/21  - MRSA swab - neg. DC vanc  - pt back to baseline home O2 requirement 2L NC prn.      Problem/Plan - 3:  ·  Problem: COPD without exacerbation.   ·  Plan: respiratory status stable, on home O2 2L NC prn    Problem/Plan - 4:  ·  Problem: Anemia.   ·  Plan: Anemia profile noted from 4/2/25 , low serum iron and TIBC, consistent with AOCD with iron deficiency   - f/u o/p    Problem/Plan - 5:  ·  Problem: Hypertension.   ·  Plan: - cont.  losartan 50 mg    Problem/Plan - 6:  ·  Problem: Abnormal heart rhythm.   ·  Plan: pt  noted on intraoperative telemetry with abnormal ST segments,   pt reports  CP with coughing , unlikely cardiac as pain reproducible    cardiology eval appreciated   TTE: Left ventricular cavity is normal in size. Left ventricular systolic function is normal with an ejection fraction of 67 % ,  There is mild (grade 1) left ventricular diastolic dysfunction.  Normal right ventricular cavity size and normal right ventricular systolic function.    Problem/Plan - 7:  ·  Problem: Sepsis.   ·  Plan: febrile on admission to [facility]   NOW RESOLVED  - infectious workup unrevealing - BCx/UA/CXR neg  - CT chest with LLL lung nodule - management as above, CT CAP without infectious etiology   - ID (Dr. Bui) evaluated at [facility] - rec flow cytometry, AFB, bacterial and fungal cx to be sent with biopsy. BAL negative for infection at this time , f/u cytopathology   - s/p cefepime x7 days  -pt had worsening leucocytosis  ,now improving ,  per daughter  her baseline is 14-15  - pt with  EBV IGM+ but no PCR detected ,  -ID f/u appreciated, leucocytosis likely reactive vs 2/2 malignancy    Problem/Plan - 9:  ·  Problem: Fall.   ·  Plan: s/p recent fall  - CT spine with s1 age indeterminant fx   - Tylenol/Tramadol PRN, lidocaine patch  - PT eval- NIKHIL.    Problems addressed:  - new diagnosis of squamous cell lung cancer  - chronic hypoxic respiratory failure  - sepsis 2/2 HAP

## 2025-04-13 NOTE — PROGRESS NOTE ADULT - ASSESSMENT
84F with hx of TAVR (2019), COPD (on PRN O2), HLD, S1 Fracture (02/2025 no surgical intervention) initially admitted to [facility] 3/31-4/4 for fever, found to have new LLL mass like consolidation, c/f malignancy, infectious workup negative, transferred to [facility] for bronchoscopy with interventional pulmonology.

## 2025-04-13 NOTE — DISCHARGE NOTE PROVIDER - DISCHARGE SERVICE FOR PATIENT
[de-identified] : EXAM: \par Gen: in no acute distress, seated comfortably, moving easily\par Skin: No discoloration, rashes; on palpation skin is dry, \par Neuro: Normal sensation all dermatomes, motor all myotomes\par Vascular: Normal pulses, no edema, normal temperature\par Coordination and balance: Normal\par Psych: normal mood and affect, non pressured speech, alert and oriented x3\par Musculoskeletal:\par \par LUMBAR SPINE\par Inspection reveals no scoliosis\par Range of motion testing demonstrates pain with flexion, full ROM\par Facet loading maneuver negative\par + tenderness to palpation of lumbar paraspinal muscles. \par Seated slump test negative\par Straight leg raise negative\par HIPS/PELVIS -\par Symmetric in standing and lying \par Hip maneuvers:\par  Range of motion full and painfree\par passive hip impingement sign negative\par ENDER negative \par Log roll negative\par Sacroiliac maneuvers:no TTP of  bilateral PSIS \par AP glide negative\par Gerard's negative\par Resisted active straight leg raise negative\par mild ttp R buttock, greater trochanters, IT band \par NEURO - Normal bulk and tone \par LE strength 5/5 including hip flexion, knee flexion, knee extension, ankle dorsiflexion, ankle plantarflexion, eversion, and EHL \par Toe-walking and heel-walking intact\par Sensation - intact to light touch in bilateral lower extremities. \par LE Reflexes 2+ patellar and Achilles reflexes bilaterally \par no Clonus\par Coordination was age appropriate and intact in all 4 limbs. \par GAIT - Normal base, normal stride length, non-antalgic\par  [de-identified] : \par The following radiographs were ordered and read by me during this patient's visit. I reviewed each radiograph in detail with the patient and discussed the findings as highlighted below. \par \par 2 views of the lumbar spine were obtained today that show no fracture, or dislocation. There are no degenerative changes seen. There is no malalignment. No obvious osseous abnormality. Otherwise unremarkable.\par  on the discharge service for the patient. I have reviewed and made amendments to the documentation where necessary.

## 2025-04-13 NOTE — DISCHARGE NOTE PROVIDER - NSDCCPCAREPLAN_GEN_ALL_CORE_FT
PRINCIPAL DISCHARGE DIAGNOSIS  Diagnosis: Lung nodule  Assessment and Plan of Treatment:       SECONDARY DISCHARGE DIAGNOSES  Diagnosis: Sepsis  Assessment and Plan of Treatment:     Diagnosis: Anemia  Assessment and Plan of Treatment:     Diagnosis: Leukocytosis  Assessment and Plan of Treatment:     Diagnosis: Hypertension  Assessment and Plan of Treatment:      PRINCIPAL DISCHARGE DIAGNOSIS  Diagnosis: Lung nodule  Assessment and Plan of Treatment: Pathology showed Squamous Cell Cancer. Please follow up on discharge at [facility] with cancer care direct likely with med onc and rad onc. PET/CT scan can be done as an outpatient      SECONDARY DISCHARGE DIAGNOSES  Diagnosis: Sepsis  Assessment and Plan of Treatment:     Diagnosis: Anemia  Assessment and Plan of Treatment:     Diagnosis: Leukocytosis  Assessment and Plan of Treatment:     Diagnosis: Hypertension  Assessment and Plan of Treatment:      PRINCIPAL DISCHARGE DIAGNOSIS  Diagnosis: Lung nodule  Assessment and Plan of Treatment: Pathology showed Squamous Cell Cancer. Please follow up on discharge at [facility] with cancer care direct likely with med onc and rad onc. PET/CT scan can be done as an outpatient      SECONDARY DISCHARGE DIAGNOSES  Diagnosis: Sepsis  Assessment and Plan of Treatment: You completed a course of antibiotics for pneumonia.

## 2025-04-13 NOTE — DISCHARGE NOTE PROVIDER - PROVIDER TOKENS
PROVIDER:[TOKEN:[[id number]],SCHEDULEDAPPT:[05/09/2025],SCHEDULEDAPPTTIME:[10:00 AM]] PROVIDER:[TOKEN:[[id number]],SCHEDULEDAPPT:[05/09/2025],SCHEDULEDAPPTTIME:[10:00 AM]],PROVIDER:[TOKEN:[[id number]]]

## 2025-04-13 NOTE — DISCHARGE NOTE PROVIDER - CARE PROVIDERS DIRECT ADDRESSES
,[email or web address] ,[email or web address] [place] [email or web address]\""[email or web address] [email or web address] [place] [email or web address]\""[email or web address]

## 2025-04-13 NOTE — PROGRESS NOTE ADULT - PROBLEM SELECTOR PLAN 5
WBC 26 today  (was 65 on admission)  - infectious workup unrevealing - BCx/UA/CXR neg, QuantiFERON-TB Gold, fungal and viral serology pending  - ID evaluated at [facility] - empric zosyn x 7 days, rec flow cytometry, AFB, bacterial and fungal cx to be sent with biopsy  pt had worsening leucocytosis  , per daughter  her baseline is 14-15  - pt with  EBV IGM+ but no PCR detected ,  -ID f/u appreciated, leucocytosis likely reactive vs 2/2 malignancy  - ID rec to monitor off abx , will dc zosyn as pt completed 7 days of empiric treatment  - trend WBC

## 2025-04-13 NOTE — DISCHARGE NOTE PROVIDER - ATTENDING DISCHARGE PHYSICAL EXAMINATION:
VITAL SIGNS:  Vital Signs Last 24 Hrs  T(C): 37 (21 Apr 2025 10:00), Max: 37 (21 Apr 2025 10:00)  T(F): 98.6 (21 Apr 2025 10:00), Max: 98.6 (21 Apr 2025 10:00)  HR: 92 (21 Apr 2025 10:00) (86 - 98)  BP: 139/75 (21 Apr 2025 10:00) (133/71 - 144/60)  BP(mean): --  RR: 16 (21 Apr 2025 10:00) (16 - 19)  SpO2: 97% (21 Apr 2025 10:00) (96% - 99%)    Parameters below as of 21 Apr 2025 10:00  Patient On (Oxygen Delivery Method): nasal cannula  O2 Flow (L/min): 2      PHYSICAL EXAM:    General: NAD  HEENT: MMM  Neck: supple  Cardiovascular: +S1/S2; RRR  Respiratory: CTA B/L; no W/R/R  Gastrointestinal: soft, NT/ND  Extremities: WWP; no edema, clubbing or cyanosis  Neurological: awake and alert; communicating and able to follow commands without appreciated focal neurologic deficits

## 2025-04-13 NOTE — PROGRESS NOTE ADULT - SUBJECTIVE AND OBJECTIVE BOX
Patient is a 84y old  Female who presents with a chief complaint of transfer from [facility] (09 Apr 2025 09:34)      SUBJECTIVE / OVERNIGHT EVENTS:    MEDICATIONS  (STANDING):  albuterol/ipratropium for Nebulization 3 milliLiter(s) Nebulizer every 6 hours  benzocaine/menthol Lozenge 1 Lozenge Oral two times a day  clotrimazole 1% Vaginal Cream 1 Applicatorful Vaginal at bedtime  enoxaparin Injectable 40 milliGRAM(s) SubCutaneous every 12 hours  fluticasone propionate/ salmeterol 250-50 MICROgram(s) Diskus 1 Dose(s) Inhalation two times a day  gabapentin 100 milliGRAM(s) Oral two times a day  hydrocodone/homatropine Syrup 5 milliLiter(s) Oral two times a day  lidocaine   4% Patch 1 Patch Transdermal daily  losartan 50 milliGRAM(s) Oral daily  pantoprazole    Tablet 40 milliGRAM(s) Oral before breakfast  polyethylene glycol 3350 17 Gram(s) Oral daily  rosuvastatin 40 milliGRAM(s) Oral at bedtime  senna 2 Tablet(s) Oral at bedtime    MEDICATIONS  (PRN):  acetaminophen     Tablet .. 650 milliGRAM(s) Oral every 6 hours PRN Temp greater or equal to 38C (100.4F), Mild Pain (1 - 3), Moderate Pain (4 - 6)  bismuth subsalicylate Liquid 15 milliLiter(s) Oral every 8 hours PRN ingestion  melatonin 3 milliGRAM(s) Oral at bedtime PRN Insomnia  promethazine 25 milliGRAM(s) Oral every 6 hours PRN allergies      Vital Signs Last 24 Hrs  T(C): 36.9 (13 Apr 2025 06:15), Max: 37.2 (12 Apr 2025 22:42)  T(F): 98.4 (13 Apr 2025 06:15), Max: 98.9 (12 Apr 2025 22:42)  HR: 85 (13 Apr 2025 06:15) (85 - 99)  BP: 139/64 (13 Apr 2025 06:15) (115/52 - 142/64)  BP(mean): --  RR: 18 (13 Apr 2025 06:15) (17 - 18)  SpO2: 95% (13 Apr 2025 06:15) (92% - 100%)    Parameters below as of 13 Apr 2025 06:15  Patient On (Oxygen Delivery Method): nasal cannula  O2 Flow (L/min): 3    CAPILLARY BLOOD GLUCOSE        I&O's Summary    12 Apr 2025 07:01  -  13 Apr 2025 07:00  --------------------------------------------------------  IN: 500 mL / OUT: 800 mL / NET: -300 mL        PHYSICAL EXAM:  GENERAL: NAD, well-developed  HEAD:  Atraumatic, Normocephalic  EYES: EOMI, PERRLA, conjunctiva and sclera clear  NECK: Supple, No JVD  CHEST/LUNG: Clear to auscultation bilaterally; No wheeze  HEART: Regular rate and rhythm; No murmurs, rubs, or gallops  ABDOMEN: Soft, Nontender, Nondistended; Bowel sounds present  EXTREMITIES:  2+ Peripheral Pulses, No clubbing, cyanosis, or edema  PSYCH: AAOx3  NEUROLOGY: non-focal  SKIN: No rashes or lesions    LABS:                        8.2    17.27 )-----------( 471      ( 13 Apr 2025 05:55 )             27.7     04-13    144  |  103  |  8   ----------------------------<  93  3.5   |  30  |  0.79    Ca    8.6      13 Apr 2025 05:55  Phos  4.3     04-13  Mg     1.90     04-13            Urinalysis Basic - ( 13 Apr 2025 05:55 )    Color: x / Appearance: x / SG: x / pH: x  Gluc: 93 mg/dL / Ketone: x  / Bili: x / Urobili: x   Blood: x / Protein: x / Nitrite: x   Leuk Esterase: x / RBC: x / WBC x   Sq Epi: x / Non Sq Epi: x / Bacteria: x        RADIOLOGY & ADDITIONAL TESTS:    Imaging Personally Reviewed:    Consultant(s) Notes Reviewed:      Care Discussed with Consultants/Other Providers:   Patient is a 84y old  Female who presents with a chief complaint of transfer from [facility] (09 Apr 2025 09:34)      SUBJECTIVE / OVERNIGHT EVENTS: patient seen and examined by bedside, pt still c/o cough , afebrile , diarrhea resolved     MEDICATIONS  (STANDING):  albuterol/ipratropium for Nebulization 3 milliLiter(s) Nebulizer every 6 hours  benzocaine/menthol Lozenge 1 Lozenge Oral two times a day  clotrimazole 1% Vaginal Cream 1 Applicatorful Vaginal at bedtime  enoxaparin Injectable 40 milliGRAM(s) SubCutaneous every 12 hours  fluticasone propionate/ salmeterol 250-50 MICROgram(s) Diskus 1 Dose(s) Inhalation two times a day  gabapentin 100 milliGRAM(s) Oral two times a day  hydrocodone/homatropine Syrup 5 milliLiter(s) Oral two times a day  lidocaine   4% Patch 1 Patch Transdermal daily  losartan 50 milliGRAM(s) Oral daily  pantoprazole    Tablet 40 milliGRAM(s) Oral before breakfast  polyethylene glycol 3350 17 Gram(s) Oral daily  rosuvastatin 40 milliGRAM(s) Oral at bedtime  senna 2 Tablet(s) Oral at bedtime    MEDICATIONS  (PRN):  acetaminophen     Tablet .. 650 milliGRAM(s) Oral every 6 hours PRN Temp greater or equal to 38C (100.4F), Mild Pain (1 - 3), Moderate Pain (4 - 6)  bismuth subsalicylate Liquid 15 milliLiter(s) Oral every 8 hours PRN ingestion  melatonin 3 milliGRAM(s) Oral at bedtime PRN Insomnia  promethazine 25 milliGRAM(s) Oral every 6 hours PRN allergies      Vital Signs Last 24 Hrs  T(C): 36.9 (13 Apr 2025 06:15), Max: 37.2 (12 Apr 2025 22:42)  T(F): 98.4 (13 Apr 2025 06:15), Max: 98.9 (12 Apr 2025 22:42)  HR: 85 (13 Apr 2025 06:15) (85 - 99)  BP: 139/64 (13 Apr 2025 06:15) (115/52 - 142/64)  BP(mean): --  RR: 18 (13 Apr 2025 06:15) (17 - 18)  SpO2: 95% (13 Apr 2025 06:15) (92% - 100%)    Parameters below as of 13 Apr 2025 06:15  Patient On (Oxygen Delivery Method): nasal cannula  O2 Flow (L/min): 3    CAPILLARY BLOOD GLUCOSE        I&O's Summary    12 Apr 2025 07:01  -  13 Apr 2025 07:00  --------------------------------------------------------  IN: 500 mL / OUT: 800 mL / NET: -300 mL      PHYSICAL EXAM:  CONSTITUTIONAL: NAD,  EYES: PERRLA; conjunctiva and sclera clear  NECK: Supple, no palpable masses;  RESPIRATORY: Normal respiratory effort; basilar crackles  bilaterally  CARDIOVASCULAR: Regular rate and rhythm, normal S1 and S2, no murmur/rub/gallop;   ABDOMEN: Nontender to palpation, normoactive bowel sounds, no rebound/guarding;   MUSCULOSKELETAL:    no clubbing or cyanosis of digits; no joint swelling or tenderness to palpation  PSYCH: A+O to person, place, and time; affect appropriate  NEUROLOGY: CN 2-12 are intact and symmetric; no gross sensory deficits;             LABS:                        8.2    17.27 )-----------( 471      ( 13 Apr 2025 05:55 )             27.7     04-13    144  |  103  |  8   ----------------------------<  93  3.5   |  30  |  0.79    Ca    8.6      13 Apr 2025 05:55  Phos  4.3     04-13  Mg     1.90     04-13            Urinalysis Basic - ( 13 Apr 2025 05:55 )    Color: x / Appearance: x / SG: x / pH: x  Gluc: 93 mg/dL / Ketone: x  / Bili: x / Urobili: x   Blood: x / Protein: x / Nitrite: x   Leuk Esterase: x / RBC: x / WBC x   Sq Epi: x / Non Sq Epi: x / Bacteria: x        RADIOLOGY & ADDITIONAL TESTS:  < from: [facility] Bone Imaging Total (04.12.25 @ 14:40) >  COMPARISON: None    OTHER STUDIES USED FOR CORRELATION: CT 4/1/2025    FINDINGS:  No random pattern of multifocal uptake that would suggest bone metastases.  Benign variant skull uptake.  Uptake around major joints is degenerative.  Focus of uptake in the upper thoracic spine is likely degenerative.  Focus of uptake in the lumbar spine correlates with degenerative changes   on CT.  Both kidneys are visualized.    IMPRESSION: No evidence of osseous metastasis.    < end of copied text >    Imaging Personally Reviewed:    Consultant(s) Notes Reviewed:      Care Discussed with Consultants/Other Providers:

## 2025-04-13 NOTE — PROGRESS NOTE ADULT - PROBLEM SELECTOR PLAN 9
DVT ppx: lovenox  DIET: DASH   DISPO: pending clinical course   plan of care d/w pt at bedside    4/9 - case d/w daughter on the phone , update provided  4/11 : case d/w son at bedside    case d/w ACP DVT ppx: lovenox  DIET: DASH   DISPO:  Rehab pending MRI   plan of care d/w pt at bedside    4/9 - case d/w daughter on the phone , update provided  4/11 : case d/w son at bedside    4/13: case d/w daughter on the phone , updates provided   case d/w ACP

## 2025-04-13 NOTE — DISCHARGE NOTE PROVIDER - ATTENDING DISCHARGE PHYSICAL EXAM:
[FreeTextEntry1] : 75 year old woman - history of seasonal asthma, arthritis, vertigo, HTN, HFpEF, S/P PPM 12/2018 - who presents for cardiovascular evaluation. \par -----------------\par ============\par ============\par 12/2018 \par Briefly, patient was admitted recently for CP. Nuclear MPI was negative. TTE was negative. Found to be bradycardic. Dr. Marc placed a PPM. \par Since PPM, still was MAURER limiting her. Previously diuretics caused cramping. \par But reports orthopnea. PND. "Fears lying flat."\par Subsequently seen in ED - likely anxiety but trial of 40 PO lasix x 2 days.\par S/P PPM - controlled on current treatment.\par HFpEF/HTN - Active\par Labs reviewed in EMR.\par -------------------\par 02/2019\par On Last visit, we planned for Lasix QOD with Potassium.\par Then, we planned to address hypertension. \par Still with MAURER. \par But prefers to stay at current dose. \par Seeing a psychiatrist for depression. \par -----------------------\par 05/2019\par Still with abrupt chest discomfort feelings. \par -----------------------\par 07/2019\par Again still with abrupt chest discomfort feelings \par - Trial of Lasix 20mg QOD with Potassium -> but now lightheadedness,\par However, likely related to obstructive sleep apnea \par Reduce Lasix to off\par ----------------------\par \par Off Lasix. Pacemaker irritation at site.\par ------------------------\par \par Re,broderick permannently off lasix. \par Seeing Dr. Beach. \par -------------------------\par \par Received a phone call from device team about an isolated, self-resolving AF episode. \par Called her to discuss starting a NOAC, but would not  on several calls.\par Will re-try. \par ------------------------\par \par BP well controlled\par On Eliquis\par Feeling well in general \par ----------------------------\par \par TTE -  - LVEF 45-50%; inferolateral wallmotion abnormality (new)\par Nuclear Stress -  - [place] corrected with prone but increased PVCs \par ------------------------------\par \par Will monitor\par --------------------------------\par \par Call cut short after 25 moins\par ---------------------------------\par \par Doing okay; no major issues. \par \par ------------------------------\par \par  Attending Discharge Physical Examination:

## 2025-04-13 NOTE — DISCHARGE NOTE PROVIDER - NSDCFUSCHEDAPPT_GEN_ALL_CORE_FT
Ronni Strickland  [facility]  J Carlos Fried  Scheduled Appointment: 05/09/2025

## 2025-04-13 NOTE — DISCHARGE NOTE PROVIDER - CARE PROVIDER_API CALL
Em [facility]  [address] [place] [id number]  Phone: [phone]  Fax: [phone]  Scheduled Appointment: 05/09/2025 10:00 AM   Em [facility]  [address] [place] [address]  Phone: [phone]  Fax: [phone]  Scheduled Appointment: 05/09/2025 10:00 AM    Shaheed Contreras  Critical Care Medicine  [address], [address]  [place] [address]  Phone: [phone]  Fax: [phone]  Follow Up Time:

## 2025-04-13 NOTE — CHART NOTE - NSCHARTNOTEFT_GEN_A_CORE
ACP Medicine    RN notified ACP of patient's increased WOB and tachypnea after coming back from MRI.  Patient examined bedside.  Patient is 84F with hx of TAVR (2019), COPD (on PRN O2), HLD, S1 Fracture (02/2025 no surgical intervention) initially admitted to [facility] 3/31-4/4 for fever, found to have new LLL mass like consolidation, c/f malignancy, infectious workup negative, transferred to [facility] for bronchoscopy with interventional pulmonology.  Patient with increased WOB, tachypneic, ill appearing, uncomfortable, diaphoretic, and rigoring.  SPO2 of 97 on 2L NC.  Patient's skin warm to touch. Patient reports feeling hot.  Rectal temp of 101.7F.  Patient denies HA, Cough, Congestion, Chest Pain, Abdominal Pain, NV.    Vital Signs Last 24 Hrs  T(C): 37.2 (13 Apr 2025 21:45), Max: 37.5 (13 Apr 2025 17:48)  T(F): 99 (13 Apr 2025 21:45), Max: 99.5 (13 Apr 2025 17:48)  HR: 96 (13 Apr 2025 21:45) (85 - 105)  BP: 133/43 (13 Apr 2025 21:45) (118/57 - 162/55)  BP(mean): --  RR: 18 (13 Apr 2025 21:45) (18 - 18)  SpO2: 96% (13 Apr 2025 21:45) (94% - 105%)    Parameters below as of 13 Apr 2025 21:45  Patient On (Oxygen Delivery Method): nasal cannula  O2 Flow (L/min): 3      PHYSICAL EXAM:  GENERAL: Ill appearing, mild distress,   CHEST/LUNG: Increased WOB, tachypneic,   HEART: Regular rate and rhythm; No murmurs, rubs, or gallops  ABDOMEN: Soft, non-tender, non-distended;   EXTREMITIES:  2+ peripheral pulses b/l, No clubbing, cyanosis, or edema  NEUROLOGY: A&O x 3, no focal deficits  SKIN: No rashes or lesions        Assessment/Plan:    HPI: Patient is 84F with hx of TAVR (2019), COPD (on PRN O2), HLD, S1 Fracture initially admitted to [facility] 3/31-4/4 for fever, found to have new LLL mass like consolidation, c/f malignancy now with new fever and increased WOB.      Fever: r/o PNA, Bacteremia, Covid/Flu/RSV    -IV Tylenol 1g   -Empiric 1x dose of Vancomycin and Zosyn  -f/u CXR  -f/u RVP   -f/u Labs: CBC, BMP, Procal, lactate  -f/u UA, BCX  -Plan of Care Discussed with RN  -Will continue to monitor  -Will follow up with day team for continuity of care.

## 2025-04-13 NOTE — DISCHARGE NOTE PROVIDER - NSDCMRMEDTOKEN_GEN_ALL_CORE_FT
acetaminophen 325 mg oral tablet: 2 tab(s) orally every 6 hours As needed Temp greater or equal to 38C (100.4F), Mild Pain (1 - 3)  fluticasone-salmeterol 250 mcg-50 mcg/inh inhalation powder: 1 puff(s) inhaled 2 times a day  gabapentin 100 mg oral capsule: 1 cap(s) orally 2 times a day  guaiFENesin 100 mg/5 mL oral liquid: 5 milliliter(s) orally every 6 hours As needed Cough  ipratropium-albuterol 0.5 mg-2.5 mg/3 mL inhalation solution: 3 milliliter(s) inhaled every 6 hours  lidocaine 4% topical film: Apply topically to affected area once a day  losartan 50 mg oral tablet: 1 tab(s) orally once a day  Lovenox 40 mg/0.4 mL injectable solution: 40 milligram(s) subcutaneously once a day  melatonin 3 mg oral tablet: 1 tab(s) orally once a day (at bedtime) As needed Insomnia  pantoprazole 40 mg oral delayed release tablet: 1 tab(s) orally once a day (before a meal)  polyethylene glycol 3350 oral powder for reconstitution: 17 gram(s) orally once a day  promethazine 25 mg oral tablet: 1 tab(s) orally every 6 hours As needed allergics  rosuvastatin 40 mg oral tablet: 1 tab(s) orally once a day (at bedtime)  senna leaf extract oral tablet: 2 tab(s) orally once a day (at bedtime)  traMADol 50 mg oral tablet: 1 tab(s) orally every 8 hours As needed Moderate Pain (4 - 6)  Zosyn 3 g-0.375 g/50 mL intravenous solution: 3.375 gram(s) intravenously every 8 hours x 7 days - started on 4/1/25   acetaminophen 325 mg oral tablet: 2 tab(s) orally every 6 hours As needed Temp greater or equal to 38C (100.4F), Mild Pain (1 - 3), Moderate Pain (4 - 6)  benzocaine-menthol 15 mg-10 mg mucous membrane lozenge: 1 lozenge orally 2 times a day  enoxaparin 40 mg/0.4 mL injectable solution: 40 milligram(s) subcutaneously once a day  fluticasone-salmeterol 250 mcg-50 mcg/inh inhalation powder: 1 puff(s) inhaled 2 times a day  gabapentin 100 mg oral capsule: 1 cap(s) orally 2 times a day  hydrocodone-homatropine 5 mg-1.5 mg/5 mL oral syrup: 5 milliliter(s) orally 2 times a day as needed for  cough  ipratropium-albuterol 0.5 mg-2.5 mg/3 mL inhalation solution: 3 milliliter(s) inhaled every 6 hours  lidocaine 4% topical film: Apply topically to affected area once a day  losartan 50 mg oral tablet: 1 tab(s) orally once a day  melatonin 3 mg oral tablet: 1 tab(s) orally once a day (at bedtime) As needed Insomnia  pantoprazole 40 mg oral delayed release tablet: 1 tab(s) orally once a day (before a meal)  polyethylene glycol 3350 oral powder for reconstitution: 17 gram(s) orally once a day  promethazine 25 mg oral tablet: 1 tab(s) orally every 6 hours As needed allergies  rosuvastatin 40 mg oral tablet: 1 tab(s) orally once a day (at bedtime)  senna leaf extract oral tablet: 2 tab(s) orally once a day (at bedtime)

## 2025-04-14 DIAGNOSIS — J18.9 PNEUMONIA, UNSPECIFIED ORGANISM: ICD-10-CM

## 2025-04-14 LAB
ANION GAP SERPL CALC-SCNC: 12 MMOL/L — SIGNIFICANT CHANGE UP (ref 7–14)
ANISOCYTOSIS BLD QL: SIGNIFICANT CHANGE UP
APPEARANCE UR: ABNORMAL
BACTERIA # UR AUTO: ABNORMAL /HPF
BASOPHILS # BLD AUTO: 0.44 K/UL — HIGH (ref 0–0.2)
BASOPHILS NFR BLD AUTO: 2.6 % — HIGH (ref 0–2)
BILIRUB UR-MCNC: NEGATIVE — SIGNIFICANT CHANGE UP
BUN SERPL-MCNC: 10 MG/DL — SIGNIFICANT CHANGE UP (ref 7–23)
CALCIUM SERPL-MCNC: 8.5 MG/DL — SIGNIFICANT CHANGE UP (ref 8.4–10.5)
CAST: 0 /LPF — SIGNIFICANT CHANGE UP (ref 0–4)
CHLORIDE SERPL-SCNC: 99 MMOL/L — SIGNIFICANT CHANGE UP (ref 98–107)
CO2 SERPL-SCNC: 30 MMOL/L — SIGNIFICANT CHANGE UP (ref 22–31)
COD CRY URNS QL: PRESENT
COLOR SPEC: SIGNIFICANT CHANGE UP
CREAT SERPL-MCNC: 0.83 MG/DL — SIGNIFICANT CHANGE UP (ref 0.5–1.3)
DIFF PNL FLD: NEGATIVE — SIGNIFICANT CHANGE UP
EGFR: 69 ML/MIN/1.73M2 — SIGNIFICANT CHANGE UP
EGFR: 69 ML/MIN/1.73M2 — SIGNIFICANT CHANGE UP
EOSINOPHIL # BLD AUTO: 0 K/UL — SIGNIFICANT CHANGE UP (ref 0–0.5)
EOSINOPHIL NFR BLD AUTO: 0 % — SIGNIFICANT CHANGE UP (ref 0–6)
GIANT PLATELETS BLD QL SMEAR: PRESENT — SIGNIFICANT CHANGE UP
GLUCOSE BLDC GLUCOMTR-MCNC: 104 MG/DL — HIGH (ref 70–99)
GLUCOSE BLDC GLUCOMTR-MCNC: 121 MG/DL — HIGH (ref 70–99)
GLUCOSE SERPL-MCNC: 108 MG/DL — HIGH (ref 70–99)
GLUCOSE UR QL: NEGATIVE MG/DL — SIGNIFICANT CHANGE UP
HCT VFR BLD CALC: 27.1 % — LOW (ref 34.5–45)
HGB BLD-MCNC: 8.1 G/DL — LOW (ref 11.5–15.5)
HYPOCHROMIA BLD QL: SIGNIFICANT CHANGE UP
IANC: 10.95 K/UL — HIGH (ref 1.8–7.4)
KETONES UR-MCNC: ABNORMAL MG/DL
LEUKOCYTE ESTERASE UR-ACNC: NEGATIVE — SIGNIFICANT CHANGE UP
LYMPHOCYTES # BLD AUTO: 0.44 K/UL — LOW (ref 1–3.3)
LYMPHOCYTES # BLD AUTO: 2.6 % — LOW (ref 13–44)
MAGNESIUM SERPL-MCNC: 1.9 MG/DL — SIGNIFICANT CHANGE UP (ref 1.6–2.6)
MCHC RBC-ENTMCNC: 16.8 PG — LOW (ref 27–34)
MCHC RBC-ENTMCNC: 29.9 G/DL — LOW (ref 32–36)
MCV RBC AUTO: 56.1 FL — LOW (ref 80–100)
METAMYELOCYTES # FLD: 1.7 % — HIGH (ref 0–1)
METAMYELOCYTES NFR BLD: 1.7 % — HIGH (ref 0–1)
MICROCYTES BLD QL: SIGNIFICANT CHANGE UP
MONOCYTES # BLD AUTO: 3.65 K/UL — HIGH (ref 0–0.9)
MONOCYTES NFR BLD AUTO: 21.5 % — HIGH (ref 2–14)
MYELOCYTES NFR BLD: 2.6 % — HIGH (ref 0–0)
NEUTROPHILS # BLD AUTO: 11.72 K/UL — HIGH (ref 1.8–7.4)
NEUTROPHILS NFR BLD AUTO: 68.1 % — SIGNIFICANT CHANGE UP (ref 43–77)
NEUTS BAND # BLD: 0.9 % — SIGNIFICANT CHANGE UP (ref 0–6)
NEUTS BAND NFR BLD: 0.9 % — SIGNIFICANT CHANGE UP (ref 0–6)
NITRITE UR-MCNC: POSITIVE
PH UR: 5.5 — SIGNIFICANT CHANGE UP (ref 5–8)
PHOSPHATE SERPL-MCNC: 4.5 MG/DL — SIGNIFICANT CHANGE UP (ref 2.5–4.5)
PLAT MORPH BLD: ABNORMAL
PLATELET # BLD AUTO: 460 K/UL — HIGH (ref 150–400)
PLATELET COUNT - ESTIMATE: NORMAL — SIGNIFICANT CHANGE UP
POIKILOCYTOSIS BLD QL AUTO: SLIGHT — SIGNIFICANT CHANGE UP
POLYCHROMASIA BLD QL SMEAR: SLIGHT — SIGNIFICANT CHANGE UP
POTASSIUM SERPL-MCNC: 3.7 MMOL/L — SIGNIFICANT CHANGE UP (ref 3.5–5.3)
POTASSIUM SERPL-SCNC: 3.7 MMOL/L — SIGNIFICANT CHANGE UP (ref 3.5–5.3)
PROT UR-MCNC: 30 MG/DL
RBC # BLD: 4.83 M/UL — SIGNIFICANT CHANGE UP (ref 3.8–5.2)
RBC # FLD: 20.9 % — HIGH (ref 10.3–14.5)
RBC BLD AUTO: ABNORMAL
RBC CASTS # UR COMP ASSIST: 4 /HPF — SIGNIFICANT CHANGE UP (ref 0–4)
REVIEW: SIGNIFICANT CHANGE UP
SODIUM SERPL-SCNC: 141 MMOL/L — SIGNIFICANT CHANGE UP (ref 135–145)
SP GR SPEC: 1.03 — HIGH (ref 1–1.03)
SQUAMOUS # UR AUTO: 2 /HPF — SIGNIFICANT CHANGE UP (ref 0–5)
URATE CRY FLD QL MICRO: PRESENT
UROBILINOGEN FLD QL: 1 MG/DL — SIGNIFICANT CHANGE UP (ref 0.2–1)
WBC # BLD: 16.99 K/UL — HIGH (ref 3.8–10.5)
WBC # FLD AUTO: 16.99 K/UL — HIGH (ref 3.8–10.5)
WBC UR QL: 1 /HPF — SIGNIFICANT CHANGE UP (ref 0–5)
YEAST-LIKE CELLS: PRESENT

## 2025-04-14 PROCEDURE — 99233 SBSQ HOSP IP/OBS HIGH 50: CPT

## 2025-04-14 RX ORDER — VANCOMYCIN HCL IN 5 % DEXTROSE 1.5G/250ML
1250 PLASTIC BAG, INJECTION (ML) INTRAVENOUS EVERY 12 HOURS
Refills: 0 | Status: DISCONTINUED | OUTPATIENT
Start: 2025-04-14 | End: 2025-04-15

## 2025-04-14 RX ORDER — CEFEPIME 2 G/20ML
2000 INJECTION, POWDER, FOR SOLUTION INTRAVENOUS EVERY 12 HOURS
Refills: 0 | Status: DISCONTINUED | OUTPATIENT
Start: 2025-04-15 | End: 2025-04-21

## 2025-04-14 RX ORDER — CEFEPIME 2 G/20ML
INJECTION, POWDER, FOR SOLUTION INTRAVENOUS
Refills: 0 | Status: DISCONTINUED | OUTPATIENT
Start: 2025-04-14 | End: 2025-04-14

## 2025-04-14 RX ORDER — CEFEPIME 2 G/20ML
2000 INJECTION, POWDER, FOR SOLUTION INTRAVENOUS ONCE
Refills: 0 | Status: COMPLETED | OUTPATIENT
Start: 2025-04-14 | End: 2025-04-14

## 2025-04-14 RX ORDER — CEFEPIME 2 G/20ML
INJECTION, POWDER, FOR SOLUTION INTRAVENOUS
Refills: 0 | Status: DISCONTINUED | OUTPATIENT
Start: 2025-04-14 | End: 2025-04-21

## 2025-04-14 RX ADMIN — Medication 1 LOZENGE: at 18:25

## 2025-04-14 RX ADMIN — ENOXAPARIN SODIUM 40 MILLIGRAM(S): 100 INJECTION SUBCUTANEOUS at 05:16

## 2025-04-14 RX ADMIN — Medication 40 MILLIGRAM(S): at 05:16

## 2025-04-14 RX ADMIN — GABAPENTIN 100 MILLIGRAM(S): 400 CAPSULE ORAL at 18:25

## 2025-04-14 RX ADMIN — ENOXAPARIN SODIUM 40 MILLIGRAM(S): 100 INJECTION SUBCUTANEOUS at 18:25

## 2025-04-14 RX ADMIN — CEFEPIME 100 MILLIGRAM(S): 2 INJECTION, POWDER, FOR SOLUTION INTRAVENOUS at 20:50

## 2025-04-14 RX ADMIN — GABAPENTIN 100 MILLIGRAM(S): 400 CAPSULE ORAL at 05:16

## 2025-04-14 RX ADMIN — Medication 20 MILLIEQUIVALENT(S): at 18:26

## 2025-04-14 RX ADMIN — LOSARTAN POTASSIUM 50 MILLIGRAM(S): 100 TABLET, FILM COATED ORAL at 05:16

## 2025-04-14 RX ADMIN — Medication 166.67 MILLIGRAM(S): at 22:11

## 2025-04-14 RX ADMIN — ROSUVASTATIN CALCIUM 40 MILLIGRAM(S): 20 TABLET, FILM COATED ORAL at 22:11

## 2025-04-14 RX ADMIN — IPRATROPIUM BROMIDE AND ALBUTEROL SULFATE 3 MILLILITER(S): .5; 2.5 SOLUTION RESPIRATORY (INHALATION) at 03:28

## 2025-04-14 NOTE — PROGRESS NOTE ADULT - ASSESSMENT
84F with hx of TAVR (2019), COPD (on PRN O2), HLD, S1 Fracture (02/2025 no surgical intervention) initially admitted to [facility] 3/31-4/4 for fever, found to have new LLL mass like consolidation, c/f malignancy, infectious workup negative, transferred to [facility] for bronchoscopy with interventional pulmonology, hosp. course complicated by new fever onset. Pt remains HD stable, started on empiric abx, infectious work-up sent.  84F with hx of TAVR (2019), COPD (on PRN O2), HLD, S1 Fracture (02/2025 no surgical intervention) initially admitted to [facility] 3/31-4/4 for fever, found to have new LLL mass like consolidation, c/f malignancy, infectious workup negative, transferred to [facility] for bronchoscopy with interventional pulmonology, hosp. course complicated by HAP.

## 2025-04-14 NOTE — PROGRESS NOTE ADULT - PROBLEM SELECTOR PLAN 5
WBC 26 today  (was 65 on admission)  - infectious workup unrevealing - BCx/UA/CXR neg, QuantiFERON-TB Gold, fungal and viral serology pending  - ID evaluated at [facility] - empric zosyn x 7 days, rec flow cytometry, AFB, bacterial and fungal cx to be sent with biopsy  pt had worsening leucocytosis  , per daughter  her baseline is 14-15  - pt with  EBV IGM+ but no PCR detected ,  -ID f/u appreciated, leucocytosis likely reactive vs 2/2 malignancy  - ID rec to monitor off abx , will dc zosyn as pt completed 7 days of empiric treatment  - trend WBC pt  noted on intraoperative telemetry with abnormal ST segments,   pt reports  CP with coughing , unlikely cardiac as pain reproducible    cardiology eval appreciated   TTE noted as above, . Left ventricular cavity is normal in size. Left ventricular systolic function is normal with an ejection fraction of 67 % ,  There is mild (grade 1) left ventricular diastolic dysfunction.  . Normal right ventricular cavity size and normal right ventricular systolic function

## 2025-04-14 NOTE — PROGRESS NOTE ADULT - PROBLEM SELECTOR PLAN 6
respiratory status stable  - continue advair BID, duoneb q6h   will dc Guafenesin   will change to Hycodan as pt says it helps her WBC 26 today  (was 65 on admission)  - infectious workup unrevealing - BCx/UA/CXR neg, QuantiFERON-TB Gold, fungal and viral serology pending  - ID evaluated at [facility] - empric zosyn x 7 days, rec flow cytometry, AFB, bacterial and fungal cx to be sent with biopsy  pt had worsening leucocytosis  , per daughter  her baseline is 14-15  - pt with  EBV IGM+ but no PCR detected ,  -ID f/u appreciated, leucocytosis likely reactive vs 2/2 malignancy  - ID rec to monitor off abx , will dc zosyn as pt completed 7 days of empiric treatment  - trend WBC

## 2025-04-14 NOTE — PROGRESS NOTE ADULT - PROBLEM SELECTOR PLAN 4
pt  noted on intraoperative telemetry with abnormal ST segments,   pt reports  CP with coughing , unlikely cardiac as pain reproducible    cardiology eval appreciated   TTE noted as above, . Left ventricular cavity is normal in size. Left ventricular systolic function is normal with an ejection fraction of 67 % ,  There is mild (grade 1) left ventricular diastolic dysfunction.  . Normal right ventricular cavity size and normal right ventricular systolic function Anemia profile noted from 4/2/25 , low serum iron and TIBC, consistent with AOCD with iron deficiency   Hb 8.9-->7.2 , maybe dilutional as pt received IVF   repeat cbc with  hb 8.2 , will CTM    will transfuse for HB < 7

## 2025-04-14 NOTE — PROGRESS NOTE ADULT - PROBLEM SELECTOR PLAN 3
Anemia profile noted from 4/2/25 , low serum iron and TIBC, consistent with AOCD with iron deficiency   Hb 8.9-->7.2 , maybe dilutional as pt received IVF   repeat cbc with  hb 8.2 , will CTM    will transfuse for HB < 7 febrile on admission to [facility]  - infectious workup unrevealing - BCx/UA/CXR neg, QuantiFERON-TB Gold, fungal and viral serology pending  - CT chest with LLL lung nodule - management as below, CT CAP without infectious etiology   - ID (Dr. Bui) evaluated at [facility] - rec flow cytometry, AFB, bacterial and fungal cx to be sent with biopsy, , , BAL negative for infection at this time , will f/u cytopathology   -on  Zosyn (planned for empiric 7 day course per ID at [facility]) -   -pt had worsening leucocytosis  ,now improving ,  per daughter  her baseline is 14-15  - pt with  EBV IGM+ but no PCR detected ,  -ID f/u appreciated, leucocytosis likely reactive vs 2/2 malignancy  - ID rec to monitor off abx , Dced zosyn as pt completed 7 days of empiric treatment

## 2025-04-14 NOTE — PROGRESS NOTE ADULT - PROBLEM SELECTOR PLAN 2
febrile on admission to [facility]  - infectious workup unrevealing - BCx/UA/CXR neg, QuantiFERON-TB Gold, fungal and viral serology pending  - CT chest with LLL lung nodule - management as below, CT CAP without infectious etiology   - ID (Dr. Bui) evaluated at [facility] - rec flow cytometry, AFB, bacterial and fungal cx to be sent with biopsy, , , BAL negative for infection at this time , will f/u cytopathology   -on  Zosyn (planned for empiric 7 day course per ID at [facility]) -   -pt had worsening leucocytosis  ,now improving ,  per daughter  her baseline is 14-15  - pt with  EBV IGM+ but no PCR detected ,  -ID f/u appreciated, leucocytosis likely reactive vs 2/2 malignancy  - ID rec to monitor off abx , Dced zosyn as pt completed 7 days of empiric treatment LLL noted on imaging  - CT with 2.4cm lung nodule  - IR unable to obtain percutaneous biopsy  - appreciate IP ,s/p navigational bronchoscopy 4/7/25 with transbronchial biopsy of the LLL, EBUS with lymph node biopsies and BAL   post procedure CXR with  no pneumothorax or right pleural effusion.  - BAL negative for infection so far   -  cytopathology noted : Left lower lobe nodule biopsy Positive for malignant cells; immunostains/IHC pending.  Lymph node biopsy: Negative for malignancy.  Interventional pulm f/u appreciated,   -oncology consulted , rec  Recommend MR head w w/o contrast and  bone scan to evaluate for any metastatic disease given recent falls/fracture hx. Bone scan negative for mets   , preliminary findings are NOT small cell/neuroendocrine feature, hence can be discharged with outpatient follow up per  oncology , outpt oncology f/u for treatment plans   will plan for Discharge to  rehab if  MRI unremarkable

## 2025-04-14 NOTE — PROGRESS NOTE ADULT - PROBLEM SELECTOR PLAN 10
DVT ppx: lovenox  DIET: DASH   DISPO:  Rehab pending MRI   plan of care d/w pt at bedside    4/9 - case d/w daughter on the phone , update provided  4/11 : case d/w son at bedside    4/13: case d/w daughter on the phone , updates provided   case d/w ACP

## 2025-04-14 NOTE — PROGRESS NOTE ADULT - SUBJECTIVE AND OBJECTIVE BOX
[facility] Division of Hospital Medicine  Alejandro Finch MD   Available on TEAMS    Patient is a 84y old  Female who presents with a chief complaint of transfer from [facility] (09 Apr 2025 09:34)      SUBJECTIVE / OVERNIGHT EVENTS: patient seen and examined by bedside, pt still c/o cough, developed fever overnight.   Infectious work-up sent.     MEDICATIONS  (STANDING):  albuterol/ipratropium for Nebulization 3 milliLiter(s) Nebulizer every 6 hours  benzocaine/menthol Lozenge 1 Lozenge Oral two times a day  clotrimazole 1% Vaginal Cream 1 Applicatorful Vaginal at bedtime  enoxaparin Injectable 40 milliGRAM(s) SubCutaneous every 12 hours  fluticasone propionate/ salmeterol 250-50 MICROgram(s) Diskus 1 Dose(s) Inhalation two times a day  gabapentin 100 milliGRAM(s) Oral two times a day  hydrocodone/homatropine Syrup 5 milliLiter(s) Oral two times a day  hydrocodone/homatropine Syrup 5 milliLiter(s) Oral once  lidocaine   4% Patch 1 Patch Transdermal daily  losartan 50 milliGRAM(s) Oral daily  pantoprazole    Tablet 40 milliGRAM(s) Oral before breakfast  polyethylene glycol 3350 17 Gram(s) Oral daily  potassium chloride    Tablet ER 20 milliEquivalent(s) Oral once  rosuvastatin 40 milliGRAM(s) Oral at bedtime  senna 2 Tablet(s) Oral at bedtime    MEDICATIONS  (PRN):  acetaminophen     Tablet .. 650 milliGRAM(s) Oral every 6 hours PRN Temp greater or equal to 38C (100.4F), Mild Pain (1 - 3), Moderate Pain (4 - 6)  bismuth subsalicylate Liquid 15 milliLiter(s) Oral every 8 hours PRN ingestion  melatonin 3 milliGRAM(s) Oral at bedtime PRN Insomnia  promethazine 25 milliGRAM(s) Oral every 6 hours PRN allergies    Vital Signs Last 24 Hrs  T(C): 36.8 (14 Apr 2025 13:53), Max: 37.5 (13 Apr 2025 17:48)  T(F): 98.2 (14 Apr 2025 13:53), Max: 99.5 (13 Apr 2025 17:48)  HR: 91 (14 Apr 2025 13:53) (86 - 105)  BP: 136/58 (14 Apr 2025 13:53) (118/57 - 162/55)  BP(mean): --  RR: 17 (14 Apr 2025 13:53) (17 - 20)  SpO2: 97% (14 Apr 2025 13:53) (94% - 105%)    Parameters below as of 14 Apr 2025 13:53  Patient On (Oxygen Delivery Method): room air      PHYSICAL EXAM:  CONSTITUTIONAL: NAD,  EYES: PERRLA; conjunctiva and sclera clear  NECK: Supple, no palpable masses;  RESPIRATORY: Normal respiratory effort; basilar crackles  bilaterally  CARDIOVASCULAR: Regular rate and rhythm, normal S1 and S2, no murmur/rub/gallop;   ABDOMEN: Nontender to palpation, normoactive bowel sounds, no rebound/guarding;   MUSCULOSKELETAL:    no clubbing or cyanosis of digits; no joint swelling or tenderness to palpation  PSYCH: A+O to person, place, and time; affect appropriate  NEUROLOGY: CN 2-12 are intact and symmetric; no gross sensory deficits;         LABS:  cret                        8.1    16.99 )-----------( 460      ( 14 Apr 2025 06:20 )             27.1     04-14    141  |  99  |  10  ----------------------------<  108[H]  3.7   |  30  |  0.83    Ca    8.5      14 Apr 2025 06:20  Phos  4.5     04-14  Mg     1.90     04-14    TPro  6.6  /  Alb  3.2[L]  /  TBili  0.5  /  DBili  x   /  AST  14  /  ALT  9   /  AlkPhos  62  04-13      LABS:                        8.2    17.27 )-----------( 471      ( 13 Apr 2025 05:55 )             27.7     04-13    144  |  103  |  8   ----------------------------<  93  3.5   |  30  |  0.79    Ca    8.6      13 Apr 2025 05:55  Phos  4.3     04-13  Mg     1.90     04-13    CAPILLARY BLOOD GLUCOSE  CAPILLARY BLOOD GLUCOSE      POCT Blood Glucose.: 121 mg/dL (14 Apr 2025 11:57)  POCT Blood Glucose.: 104 mg/dL (14 Apr 2025 08:14)        Urinalysis Basic - ( 13 Apr 2025 05:55 )    Color: x / Appearance: x / SG: x / pH: x  Gluc: 93 mg/dL / Ketone: x  / Bili: x / Urobili: x   Blood: x / Protein: x / Nitrite: x   Leuk Esterase: x / RBC: x / WBC x   Sq Epi: x / Non Sq Epi: x / Bacteria: x    Urinalysis Basic - ( 14 Apr 2025 06:20 )    Color: x / Appearance: x / SG: x / pH: x  Gluc: 108 mg/dL / Ketone: x  / Bili: x / Urobili: x   Blood: x / Protein: x / Nitrite: x   Leuk Esterase: x / RBC: x / WBC x   Sq Epi: x / Non Sq Epi: x / Bacteria: x    Urine Microscopic-Add On (NC) (04.14.25 @ 02:52)   White Blood Cell - Urine: 1 /HPF  Red Blood Cell - Urine: 4 /HPF  Bacteria: Moderate /HPF  Cast: 0 /LPF  Epithelial Cells: 2 /HPF  Yeast-like Cells: Present  Calcium Oxalate Crystals: Present  Uric Acid Crystals: Present  Review: ReviewedCulture - Acid Fast - Tissue w/Smear (04.07.25 @ 18:22)   Specimen Source: Tissue LEFT LOWER LOBE FORCEP BIO  Acid Fast Bacilli Smear:   No acid-fast bacilli seen by fluorochrome stain  Culture Results:   Culture is being performed.Culture - Blood (04.07.25 @ 18:10)   Specimen Source: Blood Blood-Peripheral  Culture Results:   No growth at 5 days      RADIOLOGY & ADDITIONAL TESTS:    < from: NM Bone Imaging Total (04.12.25 @ 14:40) >  COMPARISON: None  OTHER STUDIES USED FOR CORRELATION: CT 4/1/2025  FINDINGS:  No random pattern of multifocal uptake that would suggest bone metastases.  Benign variant skull uptake.  Uptake around major joints is degenerative.  Focus of uptake in the upper thoracic spine is likely degenerative.  Focus of uptake in the lumbar spine correlates with degenerative changes   on CT.  Both kidneys are visualized.    IMPRESSION: No evidence of osseous metastasis.  < end of copied text >       TTE W or WO Ultrasound Enhancing Agent (04.09.25 @ 15:12) >  CONCLUSIONS:   1. Left ventricular cavity is normal in size. Left ventricular systolic function is normal with an ejection fraction of 67 % by Yan's method of disks.   2. There is mild (grade 1) left ventricular diastolic dysfunction.   3. Normal right ventricular cavity size and normal right ventricular systolic function.   4. Left atrium is normal in size.   5. The right atrium is dilated.   6. A a transcatheter deployed (TAVR) is present in theaortic position. The peak transaortic velocity is 2.07 m/s, peak transaortic gradient is 17.1 mmHg and mean transaortic gradient is 10.0 mmHg with an LVOT/aortic valve VTI ratio of 0.66. There is no regurgitation.   7. No pericardial effusion seen.  8. Estimated pulmonary artery systolic pressure is 48 mmHg.   9. The inferior vena cava is normal in size measuring 2.02 cm in diameter, (normal <2.1cm) with abnormal inspiratory collapse (abnormal <50%) consistent with mildly elevated right atrial pressure (~8, range 5-10mmHg).    < end of copied text >   [facility] Division of Hospital Medicine  Alejandro Finch MD   Available on TEAMS    Patient is a 84y old  Female who presents with a chief complaint of transfer from [facility] (09 Apr 2025 09:34)      SUBJECTIVE / OVERNIGHT EVENTS: patient seen and examined by bedside, pt still c/o cough, developed fever overnight.   Infectious work-up sent.     MEDICATIONS  (STANDING):  albuterol/ipratropium for Nebulization 3 milliLiter(s) Nebulizer every 6 hours  benzocaine/menthol Lozenge 1 Lozenge Oral two times a day  clotrimazole 1% Vaginal Cream 1 Applicatorful Vaginal at bedtime  enoxaparin Injectable 40 milliGRAM(s) SubCutaneous every 12 hours  fluticasone propionate/ salmeterol 250-50 MICROgram(s) Diskus 1 Dose(s) Inhalation two times a day  gabapentin 100 milliGRAM(s) Oral two times a day  hydrocodone/homatropine Syrup 5 milliLiter(s) Oral two times a day  hydrocodone/homatropine Syrup 5 milliLiter(s) Oral once  lidocaine   4% Patch 1 Patch Transdermal daily  losartan 50 milliGRAM(s) Oral daily  pantoprazole    Tablet 40 milliGRAM(s) Oral before breakfast  polyethylene glycol 3350 17 Gram(s) Oral daily  potassium chloride    Tablet ER 20 milliEquivalent(s) Oral once  rosuvastatin 40 milliGRAM(s) Oral at bedtime  senna 2 Tablet(s) Oral at bedtime    MEDICATIONS  (PRN):  acetaminophen     Tablet .. 650 milliGRAM(s) Oral every 6 hours PRN Temp greater or equal to 38C (100.4F), Mild Pain (1 - 3), Moderate Pain (4 - 6)  bismuth subsalicylate Liquid 15 milliLiter(s) Oral every 8 hours PRN ingestion  melatonin 3 milliGRAM(s) Oral at bedtime PRN Insomnia  promethazine 25 milliGRAM(s) Oral every 6 hours PRN allergies    Vital Signs Last 24 Hrs  T(C): 36.8 (14 Apr 2025 13:53), Max: 37.5 (13 Apr 2025 17:48)  T(F): 98.2 (14 Apr 2025 13:53), Max: 99.5 (13 Apr 2025 17:48)  HR: 91 (14 Apr 2025 13:53) (86 - 105)  BP: 136/58 (14 Apr 2025 13:53) (118/57 - 162/55)  BP(mean): --  RR: 17 (14 Apr 2025 13:53) (17 - 20)  SpO2: 97% (14 Apr 2025 13:53) (94% - 105%)    Parameters below as of 14 Apr 2025 13:53  Patient On (Oxygen Delivery Method): room air      PHYSICAL EXAM:  CONSTITUTIONAL: NAD,  EYES: PERRLA; conjunctiva and sclera clear  NECK: Supple, no palpable masses;  RESPIRATORY: Normal respiratory effort; basilar crackles  bilaterally  CARDIOVASCULAR: Regular rate and rhythm, normal S1 and S2, no murmur/rub/gallop;   ABDOMEN: Nontender to palpation, normoactive bowel sounds, no rebound/guarding;   MUSCULOSKELETAL:    no clubbing or cyanosis of digits; no joint swelling or tenderness to palpation  PSYCH: A+O to person, place, and time; affect appropriate  NEUROLOGY: CN 2-12 are intact and symmetric; no gross sensory deficits;         LABS:  cret                        8.1    16.99 )-----------( 460      ( 14 Apr 2025 06:20 )             27.1     04-14    141  |  99  |  10  ----------------------------<  108[H]  3.7   |  30  |  0.83    Ca    8.5      14 Apr 2025 06:20  Phos  4.5     04-14  Mg     1.90     04-14    TPro  6.6  /  Alb  3.2[L]  /  TBili  0.5  /  DBili  x   /  AST  14  /  ALT  9   /  AlkPhos  62  04-13      LABS:                        8.2    17.27 )-----------( 471      ( 13 Apr 2025 05:55 )             27.7     04-13    144  |  103  |  8   ----------------------------<  93  3.5   |  30  |  0.79    Ca    8.6      13 Apr 2025 05:55  Phos  4.3     04-13  Mg     1.90     04-13    CAPILLARY BLOOD GLUCOSE  CAPILLARY BLOOD GLUCOSE      POCT Blood Glucose.: 121 mg/dL (14 Apr 2025 11:57)  POCT Blood Glucose.: 104 mg/dL (14 Apr 2025 08:14)        Urinalysis Basic - ( 13 Apr 2025 05:55 )    Color: x / Appearance: x / SG: x / pH: x  Gluc: 93 mg/dL / Ketone: x  / Bili: x / Urobili: x   Blood: x / Protein: x / Nitrite: x   Leuk Esterase: x / RBC: x / WBC x   Sq Epi: x / Non Sq Epi: x / Bacteria: x    Urinalysis Basic - ( 14 Apr 2025 06:20 )    Color: x / Appearance: x / SG: x / pH: x  Gluc: 108 mg/dL / Ketone: x  / Bili: x / Urobili: x   Blood: x / Protein: x / Nitrite: x   Leuk Esterase: x / RBC: x / WBC x   Sq Epi: x / Non Sq Epi: x / Bacteria: x    Urine Microscopic-Add On (NC) (04.14.25 @ 02:52)   White Blood Cell - Urine: 1 /HPF  Red Blood Cell - Urine: 4 /HPF  Bacteria: Moderate /HPF  Cast: 0 /LPF  Epithelial Cells: 2 /HPF  Yeast-like Cells: Present  Calcium Oxalate Crystals: Present  Uric Acid Crystals: Present  Review: ReviewedCulture - Acid Fast - Tissue w/Smear (04.07.25 @ 18:22)   Specimen Source: Tissue LEFT LOWER LOBE FORCEP BIO  Acid Fast Bacilli Smear:   No acid-fast bacilli seen by fluorochrome stain  Culture Results:   Culture is being performed.Culture - Blood (04.07.25 @ 18:10)   Specimen Source: Blood Blood-Peripheral  Culture Results:   No growth at 5 days      RADIOLOGY & ADDITIONAL TESTS:    < from: NM Bone Imaging Total (04.12.25 @ 14:40) >  COMPARISON: None  OTHER STUDIES USED FOR CORRELATION: CT 4/1/2025  FINDINGS:  No random pattern of multifocal uptake that would suggest bone metastases.  Benign variant skull uptake.  Uptake around major joints is degenerative.  Focus of uptake in the upper thoracic spine is likely degenerative.  Focus of uptake in the lumbar spine correlates with degenerative changes   on CT.  Both kidneys are visualized.    IMPRESSION: No evidence of osseous metastasis.  < end of copied text >       TTE W or WO Ultrasound Enhancing Agent (04.09.25 @ 15:12) >  CONCLUSIONS:   1. Left ventricular cavity is normal in size. Left ventricular systolic function is normal with an ejection fraction of 67 % by Yan's method of disks.   2. There is mild (grade 1) left ventricular diastolic dysfunction.   3. Normal right ventricular cavity size and normal right ventricular systolic function.   4. Left atrium is normal in size.   5. The right atrium is dilated.   6. A a transcatheter deployed (TAVR) is present in theaortic position. The peak transaortic velocity is 2.07 m/s, peak transaortic gradient is 17.1 mmHg and mean transaortic gradient is 10.0 mmHg with an LVOT/aortic valve VTI ratio of 0.66. There is no regurgitation.   7. No pericardial effusion seen.  8. Estimated pulmonary artery systolic pressure is 48 mmHg.   9. The inferior vena cava is normal in size measuring 2.02 cm in diameter, (normal <2.1cm) with abnormal inspiratory collapse (abnormal <50%) consistent with mildly elevated right atrial pressure (~8, range 5-10mmHg).    < end of copied text >    < from: Xray Chest 1 View- PORTABLE-Urgent (Xray Chest 1 View- PORTABLE-Urgent .) (04.13.25 @ 20:37) >  IMPRESSION:  Left mid and lower lung opacities. Prior TAVR.  ED was informed through the [facility]    --- End of Report ---    < end of copied text >

## 2025-04-14 NOTE — PROGRESS NOTE ADULT - PROBLEM SELECTOR PLAN 7
pt was noted to be hypotensive 4/7, , now resolved   - on  losartan 50 mg to be held for SBP<110   - monitor vital signs respiratory status stable  - continue advair BID, duoneb q6h   will dc Guafenesin   will change to Hycodan as pt says it helps her

## 2025-04-14 NOTE — PROGRESS NOTE ADULT - PROBLEM SELECTOR PLAN 9
DVT ppx: lovenox  DIET: DASH   DISPO:  Rehab pending MRI   plan of care d/w pt at bedside    4/9 - case d/w daughter on the phone , update provided  4/11 : case d/w son at bedside    4/13: case d/w daughter on the phone , updates provided   case d/w ACP s/p recent fall  - CT spine with s1 age indeterminant fx   - Tylenol/Tramadol PRN, lidocaine patch  - PT eval- NIKHIL

## 2025-04-14 NOTE — PROGRESS NOTE ADULT - PROBLEM SELECTOR PLAN 1
LLL noted on imaging  - CT with 2.4cm lung nodule  - IR unable to obtain percutaneous biopsy  - appreciate IP ,s/p navigational bronchoscopy 4/7/25 with transbronchial biopsy of the LLL, EBUS with lymph node biopsies and BAL   post procedure CXR with  no pneumothorax or right pleural effusion.  - BAL negative for infection so far   -  cytopathology noted : Left lower lobe nodule biopsy Positive for malignant cells; immunostains/IHC pending.  Lymph node biopsy: Negative for malignancy.  Interventional pulm f/u appreciated,   -oncology consulted , rec  Recommend MR head w w/o contrast and  bone scan to evaluate for any metastatic disease given recent falls/fracture hx. Bone scan negative for mets   , preliminary findings are NOT small cell/neuroendocrine feature, hence can be discharged with outpatient follow up per  oncology , outpt oncology f/u for treatment plans   will plan for Discharge to  rehab if  MRI unremarkable Pt developed new fever, infectious work-up revealed new L lung opacities. Pt started on Vanc and Cefepime for HAP. - cont. empiric abx.  - f/u MRSA swab   - tylenol prn for fever  - monitor resp.status. - currently on 2L NC

## 2025-04-15 LAB
ANION GAP SERPL CALC-SCNC: 12 MMOL/L — SIGNIFICANT CHANGE UP (ref 7–14)
BASOPHILS # BLD AUTO: 0.17 K/UL — SIGNIFICANT CHANGE UP (ref 0–0.2)
BASOPHILS NFR BLD AUTO: 1 % — SIGNIFICANT CHANGE UP (ref 0–2)
BUN SERPL-MCNC: 12 MG/DL — SIGNIFICANT CHANGE UP (ref 7–23)
CALCIUM SERPL-MCNC: 8.4 MG/DL — SIGNIFICANT CHANGE UP (ref 8.4–10.5)
CHLORIDE SERPL-SCNC: 98 MMOL/L — SIGNIFICANT CHANGE UP (ref 98–107)
CO2 SERPL-SCNC: 28 MMOL/L — SIGNIFICANT CHANGE UP (ref 22–31)
CREAT SERPL-MCNC: 0.9 MG/DL — SIGNIFICANT CHANGE UP (ref 0.5–1.3)
EGFR: 63 ML/MIN/1.73M2 — SIGNIFICANT CHANGE UP
EGFR: 63 ML/MIN/1.73M2 — SIGNIFICANT CHANGE UP
EOSINOPHIL # BLD AUTO: 0.1 K/UL — SIGNIFICANT CHANGE UP (ref 0–0.5)
EOSINOPHIL NFR BLD AUTO: 0.6 % — SIGNIFICANT CHANGE UP (ref 0–6)
GLUCOSE SERPL-MCNC: 87 MG/DL — SIGNIFICANT CHANGE UP (ref 70–99)
HCT VFR BLD CALC: 26.4 % — LOW (ref 34.5–45)
HGB BLD-MCNC: 8 G/DL — LOW (ref 11.5–15.5)
IANC: 10.27 K/UL — HIGH (ref 1.8–7.4)
IMM GRANULOCYTES NFR BLD AUTO: 8.3 % — HIGH (ref 0–0.9)
LYMPHOCYTES # BLD AUTO: 1.31 K/UL — SIGNIFICANT CHANGE UP (ref 1–3.3)
LYMPHOCYTES # BLD AUTO: 7.9 % — LOW (ref 13–44)
MAGNESIUM SERPL-MCNC: 1.8 MG/DL — SIGNIFICANT CHANGE UP (ref 1.6–2.6)
MCHC RBC-ENTMCNC: 17.1 PG — LOW (ref 27–34)
MCHC RBC-ENTMCNC: 30.3 G/DL — LOW (ref 32–36)
MCV RBC AUTO: 56.5 FL — LOW (ref 80–100)
MONOCYTES # BLD AUTO: 3.41 K/UL — HIGH (ref 0–0.9)
MONOCYTES NFR BLD AUTO: 20.5 % — HIGH (ref 2–14)
MRSA PCR RESULT.: SIGNIFICANT CHANGE UP
NEUTROPHILS # BLD AUTO: 10.27 K/UL — HIGH (ref 1.8–7.4)
NEUTROPHILS NFR BLD AUTO: 61.7 % — SIGNIFICANT CHANGE UP (ref 43–77)
NRBC # BLD AUTO: 0 K/UL — SIGNIFICANT CHANGE UP (ref 0–0)
NRBC # FLD: 0 K/UL — SIGNIFICANT CHANGE UP (ref 0–0)
NRBC BLD AUTO-RTO: 0 /100 WBCS — SIGNIFICANT CHANGE UP (ref 0–0)
PHOSPHATE SERPL-MCNC: 4.3 MG/DL — SIGNIFICANT CHANGE UP (ref 2.5–4.5)
PLATELET # BLD AUTO: 442 K/UL — HIGH (ref 150–400)
POTASSIUM SERPL-MCNC: 3.8 MMOL/L — SIGNIFICANT CHANGE UP (ref 3.5–5.3)
POTASSIUM SERPL-SCNC: 3.8 MMOL/L — SIGNIFICANT CHANGE UP (ref 3.5–5.3)
RBC # BLD: 4.67 M/UL — SIGNIFICANT CHANGE UP (ref 3.8–5.2)
RBC # FLD: 20.7 % — HIGH (ref 10.3–14.5)
S AUREUS DNA NOSE QL NAA+PROBE: SIGNIFICANT CHANGE UP
SODIUM SERPL-SCNC: 138 MMOL/L — SIGNIFICANT CHANGE UP (ref 135–145)
WBC # BLD: 16.65 K/UL — HIGH (ref 3.8–10.5)
WBC # FLD AUTO: 16.65 K/UL — HIGH (ref 3.8–10.5)

## 2025-04-15 PROCEDURE — 99233 SBSQ HOSP IP/OBS HIGH 50: CPT

## 2025-04-15 PROCEDURE — 70553 MRI BRAIN STEM W/O & W/DYE: CPT | Mod: 26

## 2025-04-15 PROCEDURE — 99232 SBSQ HOSP IP/OBS MODERATE 35: CPT | Mod: GC

## 2025-04-15 RX ADMIN — GABAPENTIN 100 MILLIGRAM(S): 400 CAPSULE ORAL at 05:18

## 2025-04-15 RX ADMIN — IPRATROPIUM BROMIDE AND ALBUTEROL SULFATE 3 MILLILITER(S): .5; 2.5 SOLUTION RESPIRATORY (INHALATION) at 22:06

## 2025-04-15 RX ADMIN — Medication 1 DOSE(S): at 10:25

## 2025-04-15 RX ADMIN — ENOXAPARIN SODIUM 40 MILLIGRAM(S): 100 INJECTION SUBCUTANEOUS at 18:48

## 2025-04-15 RX ADMIN — ROSUVASTATIN CALCIUM 40 MILLIGRAM(S): 20 TABLET, FILM COATED ORAL at 21:42

## 2025-04-15 RX ADMIN — ENOXAPARIN SODIUM 40 MILLIGRAM(S): 100 INJECTION SUBCUTANEOUS at 05:18

## 2025-04-15 RX ADMIN — Medication 166.67 MILLIGRAM(S): at 10:27

## 2025-04-15 RX ADMIN — LOSARTAN POTASSIUM 50 MILLIGRAM(S): 100 TABLET, FILM COATED ORAL at 05:18

## 2025-04-15 RX ADMIN — IPRATROPIUM BROMIDE AND ALBUTEROL SULFATE 3 MILLILITER(S): .5; 2.5 SOLUTION RESPIRATORY (INHALATION) at 03:33

## 2025-04-15 RX ADMIN — GABAPENTIN 100 MILLIGRAM(S): 400 CAPSULE ORAL at 18:47

## 2025-04-15 RX ADMIN — CEFEPIME 100 MILLIGRAM(S): 2 INJECTION, POWDER, FOR SOLUTION INTRAVENOUS at 21:43

## 2025-04-15 RX ADMIN — Medication 1 DOSE(S): at 21:43

## 2025-04-15 RX ADMIN — Medication 40 MILLIGRAM(S): at 05:18

## 2025-04-15 RX ADMIN — IPRATROPIUM BROMIDE AND ALBUTEROL SULFATE 3 MILLILITER(S): .5; 2.5 SOLUTION RESPIRATORY (INHALATION) at 09:30

## 2025-04-15 RX ADMIN — IPRATROPIUM BROMIDE AND ALBUTEROL SULFATE 3 MILLILITER(S): .5; 2.5 SOLUTION RESPIRATORY (INHALATION) at 15:42

## 2025-04-15 RX ADMIN — CEFEPIME 100 MILLIGRAM(S): 2 INJECTION, POWDER, FOR SOLUTION INTRAVENOUS at 10:26

## 2025-04-15 NOTE — PROGRESS NOTE ADULT - SUBJECTIVE AND OBJECTIVE BOX
Hematology Follow-up    INTERVAL HPI/OVERNIGHT EVENTS:  Patient S&E at bedside. No o/n events, patient resting comfortably. No complaints at this time. Patient denies fever, chills, dizziness, weakness, CP, palpitations, SOB, cough, N/V/D/C, dysuria, changes in bowel movements, LE edema.    VITAL SIGNS:  T(F): 98.2 (04-15-25 @ 13:43)  HR: 88 (04-15-25 @ 13:43)  BP: 122/60 (04-15-25 @ 13:43)  RR: 19 (04-15-25 @ 13:43)  SpO2: 97% (04-15-25 @ 13:43)  Wt(kg): --    PHYSICAL EXAM:    Constitutional: AAOx3, NAD,   Eyes: PERRL, EOMI, sclera non-icteric  Neck: supple, no masses, no JVD  Respiratory: CTA b/l, good air entry b/l, no wheezing, rhonchi, rales, with normal respiratory effort and no intercostal retractions  Cardiovascular: RRR, normal S1S2, no M/R/G  Gastrointestinal: soft, NTND, no masses palpable, BS normal in all four quadrants, no HSM  Extremities:  no c/c/e  Neurological: Grossly intact  Skin: Normal temperature    MEDICATIONS  (STANDING):  albuterol/ipratropium for Nebulization 3 milliLiter(s) Nebulizer every 6 hours  benzocaine/menthol Lozenge 1 Lozenge Oral two times a day  cefepime   IVPB      cefepime   IVPB 2000 milliGRAM(s) IV Intermittent every 12 hours  enoxaparin Injectable 40 milliGRAM(s) SubCutaneous every 12 hours  fluticasone propionate/ salmeterol 250-50 MICROgram(s) Diskus 1 Dose(s) Inhalation two times a day  gabapentin 100 milliGRAM(s) Oral two times a day  hydrocodone/homatropine Syrup 5 milliLiter(s) Oral two times a day  lidocaine   4% Patch 1 Patch Transdermal daily  losartan 50 milliGRAM(s) Oral daily  pantoprazole    Tablet 40 milliGRAM(s) Oral before breakfast  polyethylene glycol 3350 17 Gram(s) Oral daily  rosuvastatin 40 milliGRAM(s) Oral at bedtime  senna 2 Tablet(s) Oral at bedtime  vancomycin  IVPB 1250 milliGRAM(s) IV Intermittent every 12 hours    MEDICATIONS  (PRN):  acetaminophen     Tablet .. 650 milliGRAM(s) Oral every 6 hours PRN Temp greater or equal to 38C (100.4F), Mild Pain (1 - 3), Moderate Pain (4 - 6)  bismuth subsalicylate Liquid 15 milliLiter(s) Oral every 8 hours PRN ingestion  melatonin 3 milliGRAM(s) Oral at bedtime PRN Insomnia  promethazine 25 milliGRAM(s) Oral every 6 hours PRN allergies      fish (Unknown)  tetracycline (Rash; Urticaria)      LABS:                        8.0    16.65 )-----------( 442      ( 15 Apr 2025 05:30 )             26.4     04-15    138  |  98  |  12  ----------------------------<  87  3.8   |  28  |  0.90    Ca    8.4      15 Apr 2025 05:30  Phos  4.3     04-15  Mg     1.80     04-15    TPro  6.6  /  Alb  3.2[L]  /  TBili  0.5  /  DBili  x   /  AST  14  /  ALT  9   /  AlkPhos  62  04-13       Urinalysis Basic - ( 15 Apr 2025 05:30 )    Color: x / Appearance: x / SG: x / pH: x  Gluc: 87 mg/dL / Ketone: x  / Bili: x / Urobili: x   Blood: x / Protein: x / Nitrite: x   Leuk Esterase: x / RBC: x / WBC x   Sq Epi: x / Non Sq Epi: x / Bacteria: x        RADIOLOGY & ADDITIONAL TESTS:  Studies reviewed.   Oncology Follow-up    INTERVAL HPI/OVERNIGHT EVENTS:  Patient seen and evaluated at bedside, accompanied by daughter, continues to be on o2 via nasal canula, being treated for PNA.    VITAL SIGNS:  T(F): 98.2 (04-15-25 @ 13:43)  HR: 88 (04-15-25 @ 13:43)  BP: 122/60 (04-15-25 @ 13:43)  RR: 19 (04-15-25 @ 13:43)  SpO2: 97% (04-15-25 @ 13:43)  Wt(kg): --      PHYSICAL EXAM   GENERAL: NAD, on o2 via NC  HEAD:  Atraumatic, Normocephalic  EYES: EOMI, conjunctiva and sclera clear  CHEST/LUNG: Clear to auscultation bilaterally  HEART: Regular rate and rhythm  ABDOMEN: Soft, Nontender, Nondistended  EXTREMITIES:  No clubbing, cyanosis, or edema  NEUROLOGY: non-focal  SKIN: No rashes or lesions    MEDICATIONS  (STANDING):  albuterol/ipratropium for Nebulization 3 milliLiter(s) Nebulizer every 6 hours  benzocaine/menthol Lozenge 1 Lozenge Oral two times a day  cefepime   IVPB      cefepime   IVPB 2000 milliGRAM(s) IV Intermittent every 12 hours  enoxaparin Injectable 40 milliGRAM(s) SubCutaneous every 12 hours  fluticasone propionate/ salmeterol 250-50 MICROgram(s) Diskus 1 Dose(s) Inhalation two times a day  gabapentin 100 milliGRAM(s) Oral two times a day  hydrocodone/homatropine Syrup 5 milliLiter(s) Oral two times a day  lidocaine   4% Patch 1 Patch Transdermal daily  losartan 50 milliGRAM(s) Oral daily  pantoprazole    Tablet 40 milliGRAM(s) Oral before breakfast  polyethylene glycol 3350 17 Gram(s) Oral daily  rosuvastatin 40 milliGRAM(s) Oral at bedtime  senna 2 Tablet(s) Oral at bedtime  vancomycin  IVPB 1250 milliGRAM(s) IV Intermittent every 12 hours    MEDICATIONS  (PRN):  acetaminophen     Tablet .. 650 milliGRAM(s) Oral every 6 hours PRN Temp greater or equal to 38C (100.4F), Mild Pain (1 - 3), Moderate Pain (4 - 6)  bismuth subsalicylate Liquid 15 milliLiter(s) Oral every 8 hours PRN ingestion  melatonin 3 milliGRAM(s) Oral at bedtime PRN Insomnia  promethazine 25 milliGRAM(s) Oral every 6 hours PRN allergies      fish (Unknown)  tetracycline (Rash; Urticaria)      LABS:                        8.0    16.65 )-----------( 442      ( 15 Apr 2025 05:30 )             26.4     04-15    138  |  98  |  12  ----------------------------<  87  3.8   |  28  |  0.90    Ca    8.4      15 Apr 2025 05:30  Phos  4.3     04-15  Mg     1.80     04-15    TPro  6.6  /  Alb  3.2[L]  /  TBili  0.5  /  DBili  x   /  AST  14  /  ALT  9   /  AlkPhos  62  04-13       Urinalysis Basic - ( 15 Apr 2025 05:30 )    Color: x / Appearance: x / SG: x / pH: x  Gluc: 87 mg/dL / Ketone: x  / Bili: x / Urobili: x   Blood: x / Protein: x / Nitrite: x   Leuk Esterase: x / RBC: x / WBC x   Sq Epi: x / Non Sq Epi: x / Bacteria: x        RADIOLOGY & ADDITIONAL TESTS:  Studies reviewed.

## 2025-04-15 NOTE — PROGRESS NOTE ADULT - SUBJECTIVE AND OBJECTIVE BOX
[facility] Division of Hospital Medicine  Alejandro Finch MD   Available on TEAMS    Patient is a 84y old  Female who presents with a chief complaint of transfer from [facility] (09 Apr 2025 09:34)      SUBJECTIVE / OVERNIGHT EVENTS:  4/14: patient seen and examined by bedside, pt still c/o cough, developed fever overnight.   Infectious work-up sent.     MEDICATIONS  (STANDING):  albuterol/ipratropium for Nebulization 3 milliLiter(s) Nebulizer every 6 hours  benzocaine/menthol Lozenge 1 Lozenge Oral two times a day  cefepime   IVPB      cefepime   IVPB 2000 milliGRAM(s) IV Intermittent every 12 hours  enoxaparin Injectable 40 milliGRAM(s) SubCutaneous every 12 hours  fluticasone propionate/ salmeterol 250-50 MICROgram(s) Diskus 1 Dose(s) Inhalation two times a day  gabapentin 100 milliGRAM(s) Oral two times a day  hydrocodone/homatropine Syrup 5 milliLiter(s) Oral once  hydrocodone/homatropine Syrup 5 milliLiter(s) Oral two times a day  lidocaine   4% Patch 1 Patch Transdermal daily  losartan 50 milliGRAM(s) Oral daily  pantoprazole    Tablet 40 milliGRAM(s) Oral before breakfast  polyethylene glycol 3350 17 Gram(s) Oral daily  rosuvastatin 40 milliGRAM(s) Oral at bedtime  senna 2 Tablet(s) Oral at bedtime  vancomycin  IVPB 1250 milliGRAM(s) IV Intermittent every 12 hours    MEDICATIONS  (PRN):  acetaminophen     Tablet .. 650 milliGRAM(s) Oral every 6 hours PRN Temp greater or equal to 38C (100.4F), Mild Pain (1 - 3), Moderate Pain (4 - 6)  bismuth subsalicylate Liquid 15 milliLiter(s) Oral every 8 hours PRN ingestion  melatonin 3 milliGRAM(s) Oral at bedtime PRN Insomnia  promethazine 25 milliGRAM(s) Oral every 6 hours PRN allergies    Vital Signs Last 24 Hrs  T(C): 36.5 (15 Apr 2025 09:58), Max: 36.9 (15 Apr 2025 05:15)  T(F): 97.7 (15 Apr 2025 09:58), Max: 98.4 (15 Apr 2025 05:15)  HR: 93 (15 Apr 2025 09:58) (90 - 99)  BP: 90/50 (15 Apr 2025 09:58) (90/50 - 157/51)  BP(mean): --  RR: 18 (15 Apr 2025 09:58) (15 - 18)  SpO2: 91% (15 Apr 2025 09:58) (91% - 98%)    Parameters below as of 15 Apr 2025 09:58  Patient On (Oxygen Delivery Method): room air      PHYSICAL EXAM:  CONSTITUTIONAL: NAD,  EYES: PERRLA; conjunctiva and sclera clear  NECK: Supple, no palpable masses;  RESPIRATORY: Normal respiratory effort; basilar crackles  bilaterally  CARDIOVASCULAR: Regular rate and rhythm, normal S1 and S2, no murmur/rub/gallop;   ABDOMEN: Nontender to palpation, normoactive bowel sounds, no rebound/guarding;   MUSCULOSKELETAL:    no clubbing or cyanosis of digits; no joint swelling or tenderness to palpation  PSYCH: A+O to person, place, and time; affect appropriate  NEUROLOGY: CN 2-12 are intact and symmetric; no gross sensory deficits;       LABS:                        8.0    16.65 )-----------( 442      ( 15 Apr 2025 05:30 )             26.4     04-15    138  |  98  |  12  ----------------------------<  87  3.8   |  28  |  0.90    Ca    8.4      15 Apr 2025 05:30  Phos  4.3     04-15  Mg     1.80     04-15    TPro  6.6  /  Alb  3.2[L]  /  TBili  0.5  /  DBili  x   /  AST  14  /  ALT  9   /  AlkPhos  62  04-13        LABS:  cret                        8.1    16.99 )-----------( 460      ( 14 Apr 2025 06:20 )             27.1     04-14    141  |  99  |  10  ----------------------------<  108[H]  3.7   |  30  |  0.83    Ca    8.5      14 Apr 2025 06:20  Phos  4.5     04-14  Mg     1.90     04-14    TPro  6.6  /  Alb  3.2[L]  /  TBili  0.5  /  DBili  x   /  AST  14  /  ALT  9   /  AlkPhos  62  04-13      LABS:                        8.2    17.27 )-----------( 471      ( 13 Apr 2025 05:55 )             27.7     04-13    144  |  103  |  8   ----------------------------<  93  3.5   |  30  |  0.79    Ca    8.6      13 Apr 2025 05:55  Phos  4.3     04-13  Mg     1.90     04-13    CAPILLARY BLOOD GLUCOSE  CAPILLARY BLOOD GLUCOSE      POCT Blood Glucose.: 121 mg/dL (14 Apr 2025 11:57)  POCT Blood Glucose.: 104 mg/dL (14 Apr 2025 08:14)        Urinalysis Basic - ( 13 Apr 2025 05:55 )    Color: x / Appearance: x / SG: x / pH: x  Gluc: 93 mg/dL / Ketone: x  / Bili: x / Urobili: x   Blood: x / Protein: x / Nitrite: x   Leuk Esterase: x / RBC: x / WBC x   Sq Epi: x / Non Sq Epi: x / Bacteria: x    Urinalysis Basic - ( 14 Apr 2025 06:20 )    Color: x / Appearance: x / SG: x / pH: x  Gluc: 108 mg/dL / Ketone: x  / Bili: x / Urobili: x   Blood: x / Protein: x / Nitrite: x   Leuk Esterase: x / RBC: x / WBC x   Sq Epi: x / Non Sq Epi: x / Bacteria: x    Urine Microscopic-Add On (NC) (04.14.25 @ 02:52)   White Blood Cell - Urine: 1 /HPF  Red Blood Cell - Urine: 4 /HPF  Bacteria: Moderate /HPF  Cast: 0 /LPF  Epithelial Cells: 2 /HPF  Yeast-like Cells: Present  Calcium Oxalate Crystals: Present  Uric Acid Crystals: Present  Review: ReviewedCulture - Acid Fast - Tissue w/Smear (04.07.25 @ 18:22)   Specimen Source: Tissue LEFT LOWER LOBE FORCEP BIO  Acid Fast Bacilli Smear:   No acid-fast bacilli seen by fluorochrome stain  Culture Results:   Culture is being performed.Culture - Blood (04.07.25 @ 18:10)   Specimen Source: Blood Blood-Peripheral  Culture Results:   No growth at 5 days      Culture - Blood (collected 13 Apr 2025 19:40)  Source: Blood Blood-Peripheral  Preliminary Report (14 Apr 2025 22:02):    No growth at 24 hours    Culture - Blood (collected 13 Apr 2025 19:10)  Source: Blood Blood-Peripheral  Preliminary Report (14 Apr 2025 22:02):    No growth at 24 hours        RADIOLOGY & ADDITIONAL TESTS:    < from: NM Bone Imaging Total (04.12.25 @ 14:40) >  COMPARISON: None  OTHER STUDIES USED FOR CORRELATION: CT 4/1/2025  FINDINGS:  No random pattern of multifocal uptake that would suggest bone metastases.  Benign variant skull uptake.  Uptake around major joints is degenerative.  Focus of uptake in the upper thoracic spine is likely degenerative.  Focus of uptake in the lumbar spine correlates with degenerative changes   on CT.  Both kidneys are visualized.    IMPRESSION: No evidence of osseous metastasis.  < end of copied text >       TTE W or WO Ultrasound Enhancing Agent (04.09.25 @ 15:12) >  CONCLUSIONS:   1. Left ventricular cavity is normal in size. Left ventricular systolic function is normal with an ejection fraction of 67 % by Yan's method of disks.   2. There is mild (grade 1) left ventricular diastolic dysfunction.   3. Normal right ventricular cavity size and normal right ventricular systolic function.   4. Left atrium is normal in size.   5. The right atrium is dilated.   6. A a transcatheter deployed (TAVR) is present in theaortic position. The peak transaortic velocity is 2.07 m/s, peak transaortic gradient is 17.1 mmHg and mean transaortic gradient is 10.0 mmHg with an LVOT/aortic valve VTI ratio of 0.66. There is no regurgitation.   7. No pericardial effusion seen.  8. Estimated pulmonary artery systolic pressure is 48 mmHg.   9. The inferior vena cava is normal in size measuring 2.02 cm in diameter, (normal <2.1cm) with abnormal inspiratory collapse (abnormal <50%) consistent with mildly elevated right atrial pressure (~8, range 5-10mmHg).    < end of copied text >    < from: Xray Chest 1 View- PORTABLE-Urgent (Xray Chest 1 View- PORTABLE-Urgent .) (04.13.25 @ 20:37) >  IMPRESSION:  Left mid and lower lung opacities. Prior TAVR.  ED was informed through the [facility]    --- End of Report ---    < end of copied text >   [facility] Division of Hospital Medicine  Alejandro Finch MD   Available on TEAMS    Patient is a 84y old  Female who presents with a chief complaint of transfer from [facility] (09 Apr 2025 09:34)      SUBJECTIVE / OVERNIGHT EVENTS:  patient seen and examined by bedside, NAD, denies worsening SOB. No CP. No new fevers.     MEDICATIONS  (STANDING):  albuterol/ipratropium for Nebulization 3 milliLiter(s) Nebulizer every 6 hours  benzocaine/menthol Lozenge 1 Lozenge Oral two times a day  cefepime   IVPB      cefepime   IVPB 2000 milliGRAM(s) IV Intermittent every 12 hours  enoxaparin Injectable 40 milliGRAM(s) SubCutaneous every 12 hours  fluticasone propionate/ salmeterol 250-50 MICROgram(s) Diskus 1 Dose(s) Inhalation two times a day  gabapentin 100 milliGRAM(s) Oral two times a day  hydrocodone/homatropine Syrup 5 milliLiter(s) Oral once  hydrocodone/homatropine Syrup 5 milliLiter(s) Oral two times a day  lidocaine   4% Patch 1 Patch Transdermal daily  losartan 50 milliGRAM(s) Oral daily  pantoprazole    Tablet 40 milliGRAM(s) Oral before breakfast  polyethylene glycol 3350 17 Gram(s) Oral daily  rosuvastatin 40 milliGRAM(s) Oral at bedtime  senna 2 Tablet(s) Oral at bedtime  vancomycin  IVPB 1250 milliGRAM(s) IV Intermittent every 12 hours    MEDICATIONS  (PRN):  acetaminophen     Tablet .. 650 milliGRAM(s) Oral every 6 hours PRN Temp greater or equal to 38C (100.4F), Mild Pain (1 - 3), Moderate Pain (4 - 6)  bismuth subsalicylate Liquid 15 milliLiter(s) Oral every 8 hours PRN ingestion  melatonin 3 milliGRAM(s) Oral at bedtime PRN Insomnia  promethazine 25 milliGRAM(s) Oral every 6 hours PRN allergies    Vital Signs Last 24 Hrs  T(C): 36.5 (15 Apr 2025 09:58), Max: 36.9 (15 Apr 2025 05:15)  T(F): 97.7 (15 Apr 2025 09:58), Max: 98.4 (15 Apr 2025 05:15)  HR: 93 (15 Apr 2025 09:58) (90 - 99)  BP: 90/50 (15 Apr 2025 09:58) (90/50 - 157/51)  BP(mean): --  RR: 18 (15 Apr 2025 09:58) (15 - 18)  SpO2: 91% (15 Apr 2025 09:58) (91% - 98%)    Parameters below as of 15 Apr 2025 09:58  Patient On (Oxygen Delivery Method): room air      PHYSICAL EXAM:  CONSTITUTIONAL: NAD,  EYES: PERRLA; conjunctiva and sclera clear  NECK: Supple, no palpable masses;  RESPIRATORY: Normal respiratory effort; basilar crackles  bilaterally  CARDIOVASCULAR: Regular rate and rhythm, normal S1 and S2, no murmur/rub/gallop;   ABDOMEN: Nontender to palpation, normoactive bowel sounds, no rebound/guarding;   MUSCULOSKELETAL:    no clubbing or cyanosis of digits; no joint swelling or tenderness to palpation  PSYCH: A+O to person, place, and time; affect appropriate  NEUROLOGY: CN 2-12 are intact and symmetric; no gross sensory deficits;       LABS:                        8.0    16.65 )-----------( 442      ( 15 Apr 2025 05:30 )             26.4     04-15    138  |  98  |  12  ----------------------------<  87  3.8   |  28  |  0.90    Ca    8.4      15 Apr 2025 05:30  Phos  4.3     04-15  Mg     1.80     04-15    TPro  6.6  /  Alb  3.2[L]  /  TBili  0.5  /  DBili  x   /  AST  14  /  ALT  9   /  AlkPhos  62  04-13        LABS:  cret                        8.1    16.99 )-----------( 460      ( 14 Apr 2025 06:20 )             27.1     04-14    141  |  99  |  10  ----------------------------<  108[H]  3.7   |  30  |  0.83    Ca    8.5      14 Apr 2025 06:20  Phos  4.5     04-14  Mg     1.90     04-14    TPro  6.6  /  Alb  3.2[L]  /  TBili  0.5  /  DBili  x   /  AST  14  /  ALT  9   /  AlkPhos  62  04-13      LABS:                        8.2    17.27 )-----------( 471      ( 13 Apr 2025 05:55 )             27.7     04-13    144  |  103  |  8   ----------------------------<  93  3.5   |  30  |  0.79    Ca    8.6      13 Apr 2025 05:55  Phos  4.3     04-13  Mg     1.90     04-13    CAPILLARY BLOOD GLUCOSE  CAPILLARY BLOOD GLUCOSE      POCT Blood Glucose.: 121 mg/dL (14 Apr 2025 11:57)  POCT Blood Glucose.: 104 mg/dL (14 Apr 2025 08:14)        Urinalysis Basic - ( 13 Apr 2025 05:55 )    Color: x / Appearance: x / SG: x / pH: x  Gluc: 93 mg/dL / Ketone: x  / Bili: x / Urobili: x   Blood: x / Protein: x / Nitrite: x   Leuk Esterase: x / RBC: x / WBC x   Sq Epi: x / Non Sq Epi: x / Bacteria: x    Urinalysis Basic - ( 14 Apr 2025 06:20 )    Color: x / Appearance: x / SG: x / pH: x  Gluc: 108 mg/dL / Ketone: x  / Bili: x / Urobili: x   Blood: x / Protein: x / Nitrite: x   Leuk Esterase: x / RBC: x / WBC x   Sq Epi: x / Non Sq Epi: x / Bacteria: x    Urine Microscopic-Add On (NC) (04.14.25 @ 02:52)   White Blood Cell - Urine: 1 /HPF  Red Blood Cell - Urine: 4 /HPF  Bacteria: Moderate /HPF  Cast: 0 /LPF  Epithelial Cells: 2 /HPF  Yeast-like Cells: Present  Calcium Oxalate Crystals: Present  Uric Acid Crystals: Present  Review: ReviewedCulture - Acid Fast - Tissue w/Smear (04.07.25 @ 18:22)   Specimen Source: Tissue LEFT LOWER LOBE FORCEP BIO  Acid Fast Bacilli Smear:   No acid-fast bacilli seen by fluorochrome stain  Culture Results:   Culture is being performed.Culture - Blood (04.07.25 @ 18:10)   Specimen Source: Blood Blood-Peripheral  Culture Results:   No growth at 5 days      Culture - Blood (collected 13 Apr 2025 19:40)  Source: Blood Blood-Peripheral  Preliminary Report (14 Apr 2025 22:02):    No growth at 24 hours    Culture - Blood (collected 13 Apr 2025 19:10)  Source: Blood Blood-Peripheral  Preliminary Report (14 Apr 2025 22:02):    No growth at 24 hours        RADIOLOGY & ADDITIONAL TESTS:    < from: NM Bone Imaging Total (04.12.25 @ 14:40) >  COMPARISON: None  OTHER STUDIES USED FOR CORRELATION: CT 4/1/2025  FINDINGS:  No random pattern of multifocal uptake that would suggest bone metastases.  Benign variant skull uptake.  Uptake around major joints is degenerative.  Focus of uptake in the upper thoracic spine is likely degenerative.  Focus of uptake in the lumbar spine correlates with degenerative changes   on CT.  Both kidneys are visualized.    IMPRESSION: No evidence of osseous metastasis.  < end of copied text >       TTE W or WO Ultrasound Enhancing Agent (04.09.25 @ 15:12) >  CONCLUSIONS:   1. Left ventricular cavity is normal in size. Left ventricular systolic function is normal with an ejection fraction of 67 % by Yan's method of disks.   2. There is mild (grade 1) left ventricular diastolic dysfunction.   3. Normal right ventricular cavity size and normal right ventricular systolic function.   4. Left atrium is normal in size.   5. The right atrium is dilated.   6. A a transcatheter deployed (TAVR) is present in theaortic position. The peak transaortic velocity is 2.07 m/s, peak transaortic gradient is 17.1 mmHg and mean transaortic gradient is 10.0 mmHg with an LVOT/aortic valve VTI ratio of 0.66. There is no regurgitation.   7. No pericardial effusion seen.  8. Estimated pulmonary artery systolic pressure is 48 mmHg.   9. The inferior vena cava is normal in size measuring 2.02 cm in diameter, (normal <2.1cm) with abnormal inspiratory collapse (abnormal <50%) consistent with mildly elevated right atrial pressure (~8, range 5-10mmHg).    < end of copied text >    < from: Xray Chest 1 View- PORTABLE-Urgent (Xray Chest 1 View- PORTABLE-Urgent .) (04.13.25 @ 20:37) >  IMPRESSION:  Left mid and lower lung opacities. Prior TAVR.  ED was informed through the [facility]    --- End of Report ---    < end of copied text >

## 2025-04-15 NOTE — PROVIDER CONTACT NOTE (OTHER) - ACTION/TREATMENT ORDERED:
Tramadol PRN given
No new order at this time. As per provider she will see the pt.
iv tylenol   lab work up

## 2025-04-15 NOTE — PROGRESS NOTE ADULT - PROBLEM SELECTOR PLAN 2
LLL noted on imaging  - CT with 2.4cm lung nodule  - IR unable to obtain percutaneous biopsy  - appreciate IP ,s/p navigational bronchoscopy 4/7/25 with transbronchial biopsy of the LLL, EBUS with lymph node biopsies and BAL   post procedure CXR with  no pneumothorax or right pleural effusion.  - BAL negative for infection so far   -  cytopathology noted : Left lower lobe nodule biopsy Positive for malignant cells; immunostains/IHC pending.  Lymph node biopsy: Negative for malignancy.  Interventional pulm f/u appreciated,   -oncology consulted , rec  Recommend MR head w w/o contrast and  bone scan to evaluate for any metastatic disease given recent falls/fracture hx. Bone scan negative for mets   , preliminary findings are NOT small cell/neuroendocrine feature, hence can be discharged with outpatient follow up per  oncology , outpt oncology f/u for treatment plans   will plan for Discharge to  rehab if  MRI unremarkable LLL noted on imaging  - CT with 2.4cm lung nodule  - IR unable to obtain percutaneous biopsy  - appreciate IP ,s/p navigational bronchoscopy 4/7/25 with transbronchial biopsy of the LLL, EBUS with lymph node biopsies and BAL   post procedure CXR with  no pneumothorax or right pleural effusion.  - BAL negative for infection so far   -  cytopathology noted : Left lower lobe nodule biopsy Positive for malignant cells; immunostains/IHC pending.  Lymph node biopsy: Negative for malignancy.  Interventional pulm f/u appreciated,   -oncology consulted , rec  Recommend MR head w w/o contrast and  bone scan to evaluate for any metastatic disease given recent falls/fracture hx. Bone scan negative for mets   , preliminary findings are NOT small cell/neuroendocrine feature, hence can be discharged with outpatient follow up per  oncology, outpt oncology f/u for treatment plans. PET/CT can be done outpatient to guide therapy options.   Pending MRI brain, prior to discharge to [facility]

## 2025-04-15 NOTE — PROGRESS NOTE ADULT - PROBLEM SELECTOR PLAN 10
DVT ppx: lovenox  DIET: DASH   DISPO:  Rehab pending MRI   plan of care d/w pt at bedside    4/9 - case d/w daughter on the phone , update provided  4/11 : case d/w son at bedside    4/13: case d/w daughter on the phone , updates provided   case d/w ACP DVT ppx: lovenox  DIET: DASH   DISPO:  Rehab pending MRI   plan of care d/w pt at bedside    4/9 - case d/w daughter on the phone , update provided  4/11 : case d/w son at bedside    4/13: case d/w daughter on the phone , updates provided   case d/w ACP  4/15 tried calling daughter multiple times, no answer

## 2025-04-15 NOTE — PROGRESS NOTE ADULT - PROBLEM SELECTOR PLAN 6
WBC 26 today  (was 65 on admission)  - infectious workup unrevealing - BCx/UA/CXR neg, QuantiFERON-TB Gold, fungal and viral serology pending  - ID evaluated at [facility] - empric zosyn x 7 days, rec flow cytometry, AFB, bacterial and fungal cx to be sent with biopsy  pt had worsening leucocytosis  , per daughter  her baseline is 14-15  - pt with  EBV IGM+ but no PCR detected ,  -ID f/u appreciated, leucocytosis likely reactive vs 2/2 malignancy  - ID rec to monitor off abx , will dc zosyn as pt completed 7 days of empiric treatment  - trend WBC improving (was 65 on admission)  - infectious workup unrevealing - BCx/UA/CXR neg, QuantiFERON-TB Gold, fungal and viral serology pending  - ID evaluated at [facility] - empric zosyn x 7 days, rec flow cytometry, AFB, bacterial and fungal cx to be sent with biopsy  pt had worsening leucocytosis  , per daughter  her baseline is 14-15  - pt with  EBV IGM+ but no PCR detected ,  -ID f/u appreciated, leucocytosis likely reactive vs 2/2 malignancy  - ID rec to monitor off abx , will dc zosyn as pt completed 7 days of empiric treatment  - trend WBC

## 2025-04-15 NOTE — PROVIDER CONTACT NOTE (OTHER) - SITUATION
pt tachypneic and has spiked a temperature, pt reports chest pain.
Bp 90/50 mmhg
Patient c/o 8/10 pain L mid axillary.

## 2025-04-15 NOTE — PROGRESS NOTE ADULT - ATTENDING COMMENTS
Patient seen and examined with team at bedside during rounds after lab data, medical records and radiology reports reviewed. I have read and agreeable in general with the documented note, assessment, and management plan which reflects my opinions from bedside rounds. Agree with note above and will highlight the followin-year-old female with history of TAVR in 2019 COPD recent S1 fracture being medically managed who presented with fever and [facility] found to have a left lower lobe masslike consolidation abutting descending aorta.  IP consulted for consideration of biopsy.  review of CT from 3/31/2025 demonstrating left lower lobe consolidation round in appearance.  This was not previously noted in 2019 on TAVR evaluation CT or follow-up CT in October.    however is possible for nodule to progress during this period of time.  Possibility of infection does exist as well.  White blood cell count remains elevated but is previously 65 now down to 26 although this is of unclear etiology was treated with empiric Zosyn.  No lymphadenopathy  noted but  and on lymph node focus  lymphoma could be possible as a .  I suspect this is likely malignancy given the rounded appearance.      Status post navigational bronchoscopy on 2025 with EBUS lymph node biopsies.  Pending infectious workup but should remain on Zosyn.  Overall doing well no hemoptysis no chest pain no shortness of breath.        I have personally provided 63 minutes of non-critical care time concurrently with the resident/fellow/NP/PA. This time excludes time spent on separate procedures and time spent teaching. I have reviewed the resident/fellow/NP/PA’s documentation and I agree with the assessment and plan of care. I have personally reviewed laboratory data, radiology results, discussion with primary team\patient, discussion with consulting services, and monitoring for potential decompensation. Interventional Pulmonology services provided to the patient were separate from general pulmonary service due to complexity of issues and interventions required.  Complex patient requiring advanced services.    Shaheed Contreras MD  Interventional Pulmonology & Critical Care Medicine .
85 yo F with hx of TAVR (2019), COPD (on PRN O2), HLD, S1 Fracture (02/2025 no surgical intervention) initially admitted to [facility] 3/31-4/4 for fever, found to have new LLL mass like consolidation transferred to [facility] for bronch/EBUS with path showing malignant cells and IHC consistent with squamous cell carcinoma. CT Chest/Abdomen/Pelvis (C/A/P) 4/1/25:  2.4 x 2.4 cm irregular, non-calcified mass-like density on the medial aspect of the left lower lobe, abutting the pleura. Surgical path 4/7/25 of Left lower lobe nodule biopsy via EBUS: Positive for malignant cells; immunostains/IHC pending. Lymph node biopsy: Negative for malignancy. MR brain to be done prior to discharge as part of staging workup. Given recent bone fracture bone scan was ordered and negative for osseous metastases. Recommend standing hycodan qhs for cough which is preventing the patient from sleeping. Will refer to [facility] following discharge for outpatient consultation with medical oncology. PET/CT scan can be done as an outpatient and if negative for lymphadenopathy or distant metastatic disease she may be a candidate for definitive radiation with SRS with curative intent as she is not a good candidate for surgical resection given COPD and medical comorbidities. Plan discussed with patient and daughter at bedside.
Agree with above. Patient s/p robotic bronchoscopy, biopsy, now with diagnosed early stage lung cancer, pending outpatient staging completion. Recommend thoracic surgery and med onc eval, to assess surgical candidacy and treatment options. Set up outpatient follow up with Dr. Contreras. Call with questions.

## 2025-04-15 NOTE — PROGRESS NOTE ADULT - PROBLEM SELECTOR PLAN 1
Pt developed new fever, infectious work-up revealed new L lung opacities. Pt started on Vanc and Cefepime for HAP. - cont. empiric abx.  - f/u MRSA swab   - tylenol prn for fever  - monitor resp.status. - currently on RA <-- 2L NC Pt developed new fever, infectious work-up revealed new L lung opacities. Pt started on Vanc and Cefepime for HAP. - cont. cefepime  - MRSA swab - neg. DC vanc.  - tylenol prn for fever  - monitor resp.status. - currently on 2L NC

## 2025-04-15 NOTE — PROVIDER CONTACT NOTE (OTHER) - BACKGROUND
Admitted for ling nodule and sepsis
pt was supposed to have MRI done, MRI not done due to pt not complying with questions. provider requested to do consent with pt . when rn assessed pt was found to be tahcypneic despite respiratory tx
Lung mass

## 2025-04-15 NOTE — PROGRESS NOTE ADULT - PROBLEM SELECTOR PLAN 3
febrile on admission to [facility]  - infectious workup unrevealing - BCx/UA/CXR neg, QuantiFERON-TB Gold, fungal and viral serology pending  - CT chest with LLL lung nodule - management as below, CT CAP without infectious etiology   - ID (Dr. Bui) evaluated at [facility] - rec flow cytometry, AFB, bacterial and fungal cx to be sent with biopsy, , , BAL negative for infection at this time , will f/u cytopathology   -on  Zosyn (planned for empiric 7 day course per ID at [facility]) -   -pt had worsening leucocytosis  ,now improving ,  per daughter  her baseline is 14-15  - pt with  EBV IGM+ but no PCR detected ,  -ID f/u appreciated, leucocytosis likely reactive vs 2/2 malignancy  - ID rec to monitor off abx , Dced zosyn as pt completed 7 days of empiric treatment
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
## 2025-04-15 NOTE — PROGRESS NOTE ADULT - ASSESSMENT
85 yo F with hx of TAVR (2019), COPD (on PRN O2), HLD, S1 Fracture (02/2025 no surgical intervention) initially admitted to [facility] 3/31-4/4 for fever, found to have new LL mass like consolidation transferred to [facility] for bronch/EBUS with path concerning for malignancy.    #Newly diagnosed SCC lung cancer (pending immunostains/IHC)  CT Chest/Abdomen/Pelvis (C/A/P) 4/1/25:  2.4 x 2.4 cm irregular, non-calcified mass-like density on the medial aspect of the left lower lobe, abutting the pleura. This finding is new since October 2019 and is presumed to be of neoplastic etiology until proven otherwise. left adrenal nodule previously measured 1.2 cm in October 2019, now measures 2 cm. The attenuation values are consistent with an adenoma.The spleen is enlarged (15.5 cm), an increase in size compared to the prior exam from October 2019.No focal defects in the spleen. Scattered subcentimeter periaortic lymph nodes noted. Small bilateral external iliac lymph nodes (up to 1.1 x 0.9 cm).  - Surgical path 4/7/25:   Left lower lobe nodule biopsy via EBUS: Positive for malignant cells; immunostains/IHC pending.  Lymph node biopsy: Negative for malignancy.  - Team previously consulted for leukocytosis; peripheral blood flow 4/2/25: The lymphocyte population is decreased, immunophenotypic findings show no diagnostic abnormalities. The myeloid immunophenotypic findings show no myeloblasts, increased myeloid population myeloid granularity, and normal myeloid antigen pattern. Monocytosis (21%) with increased intermediate monocyte subset. BCR-ABL negative, likely leukemoid reaction.    Recommendations:  - Recommend MR head w w/o contrast  - Based on the current CT scan findings, the mass appears to be in the early stage. Treatment options to be discussed outpatient   - Recommend bone scan to evaluate for any metastatic disease given recent falls/fracture hx.  - Discussed with path, preliminary findings are NOT small cell/neuroendocrine feature, hence can be discharged with outpatient follow up from oncology standpoint   - PT eval: recommending NIKHIL  - Recommend standing oral hycodan qhs for ongoing cough, symptom control, as the patient is having difficulty sleeping due to cough.  - Will arrange for medical oncology follow up on discharge at [facility], please notify team prior to discharge  - Plan discussed with patient and daughter at bedside.      Please do not hesitate to page with questions.     Gela Pacheco MD  Hematology/Oncology Fellow PGY5  Available on [employer] Teams   Pager: [phone]  For weekends and evenings (5 pm - 8 am), please page fellow on call.    83 yo F with hx of TAVR (2019), COPD (on PRN O2), HLD, S1 Fracture (02/2025 no surgical intervention) initially admitted to [facility] 3/31-4/4 for fever, found to have new LL mass like consolidation transferred to [facility] for bronch/EBUS with path c/w SCC.    #Newly diagnosed SCC lung cancer   - CT Chest/Abdomen/Pelvis (C/A/P) 4/1/25:  2.4 x 2.4 cm irregular, non-calcified mass-like density on the medial aspect of the left lower lobe, abutting the pleura. This finding is new since October 2019 and is presumed to be of neoplastic etiology until proven otherwise. left adrenal nodule previously measured 1.2 cm in October 2019, now measures 2 cm. The attenuation values are consistent with an adenoma.The spleen is enlarged (15.5 cm), an increase in size compared to the prior exam from October 2019.No focal defects in the spleen. Scattered subcentimeter periaortic lymph nodes noted. Small bilateral external iliac lymph nodes (up to 1.1 x 0.9 cm).  - Surgical path 4/7/25: Left lower lobe nodule biopsy via EBUS: Positive for malignant cells; The immunostains are compatible with squamous cell carcinoma.  - Team previously consulted for leukocytosis; peripheral blood flow 4/2/25: The lymphocyte population is decreased, immunophenotypic findings show no diagnostic abnormalities. The myeloid immunophenotypic findings show no myeloblasts, increased myeloid population myeloid granularity, and normal myeloid antigen pattern. Monocytosis (21%) with increased intermediate monocyte subset. BCR-ABL negative, likely leukemoid reaction.  - Bone scan 4/12/25: negative of osseous metastasis     Recommendations:  - Given path now consistent with SCC, MR head w w/o contrast not required for staging/workup however daughter would like to proceed with it inpatient   - Recommend bone scan to evaluate for any metastatic disease given recent falls/fracture hx.  - Infectious workup per primary team, currently being treated for PNA  - PT eval: recommending NIKHIL  - Will arrange for medical oncology follow up on discharge at [facility] with cancer care direct likely with med onc and rad onc.   - Plan discussed with patient and daughter at bedside.    Please do not hesitate to page with questions.     Gela Pacheco MD  Hematology/Oncology Fellow PGY5  Available on [employer] Teams   Pager: [phone]  For weekends and evenings (5 pm - 8 am), please page fellow on call.  83 yo F with hx of TAVR (2019), COPD (on PRN O2), HLD, S1 Fracture (02/2025 no surgical intervention) initially admitted to [facility] 3/31-4/4 for fever, found to have new LLL mass like consolidation transferred to [facility] for bronch/EBUS with path c/w SCC.    #Newly diagnosed SCC lung cancer   - CT Chest/Abdomen/Pelvis (C/A/P) 4/1/25:  2.4 x 2.4 cm irregular, non-calcified mass-like density on the medial aspect of the left lower lobe, abutting the pleura. This finding is new since October 2019 and is presumed to be of neoplastic etiology until proven otherwise. Left adrenal nodule previously measured 1.2 cm in October 2019, now measures 2 cm. The attenuation values are consistent with an adenoma. The spleen is enlarged (15.5 cm), an increase in size compared to the prior exam from October 2019.No focal defects in the spleen. Scattered subcentimeter periaortic lymph nodes noted. Small bilateral external iliac lymph nodes (up to 1.1 x 0.9 cm).  - Surgical path 4/7/25: Left lower lobe nodule biopsy via EBUS: Positive for malignant cells; The immunostains are compatible with squamous cell carcinoma.  - Hematology at [facility] previously consulted for leukocytosis; peripheral blood flow 4/2/25: The lymphocyte population is decreased, immunophenotypic findings show no diagnostic abnormalities. The myeloid immunophenotypic findings show no myeloblasts, increased myeloid population myeloid granularity, and normal myeloid antigen pattern. Monocytosis (21%) with increased intermediate monocyte subset. BCR-ABL negative, likely leukemoid reaction.  - Bone scan 4/12/25: negative for osseous metastases     Recommendations:  - Given path now consistent with SCC, MR head w w/o contrast not required for staging/workup however daughter would like to proceed with it inpatient   - Bone scan ordered to evaluate for any metastatic disease given recent falls/fracture hx and was negative for evidence of osseous metastases  - Infectious workup per primary team, currently being treated for PNA  - PT eval: recommending NIKHIL  - Will arrange for medical oncology follow up on discharge at [facility] with cancer care direct likely with med onc and rad onc.   - PET/CT scan can be done as an outpatient and if negative for lymphadenopathy or evidence of distant disease she may be a candidate for SRS definitive radiation to primary lung mass with intent to cure.  - Plan discussed with patient and daughter at bedside.    Please do not hesitate to page with questions.     Glea Pacheco MD  Hematology/Oncology Fellow PGY5  Available on [employer] Teams   Pager: [phone]  For weekends and evenings (5 pm - 8 am), please page fellow on call.

## 2025-04-15 NOTE — PROGRESS NOTE ADULT - ASSESSMENT
84F with hx of TAVR (2019), COPD (on PRN O2), HLD, S1 Fracture (02/2025 no surgical intervention) initially admitted to [facility] 3/31-4/4 for fever, found to have new LLL mass like consolidation, c/f malignancy, infectious workup negative, transferred to [facility] for bronchoscopy with interventional pulmonology, hosp. course complicated by HAP initially requiring O2 supplementation, now on RA 84F with hx of TAVR (2019), COPD (on PRN O2), HLD, S1 Fracture (02/2025 no surgical intervention) initially admitted to [facility] 3/31-4/4 for fever, found to have new LLL mass like consolidation, transferred to [facility] for bronchoscopy with interventional pulmonology- path consistent with SCC, negative bone scan, pending inpatient MRI brain, hosp. course complicated by HAP, now on Vanc/cefepime, comfortable on NC 2L.

## 2025-04-16 LAB
ANION GAP SERPL CALC-SCNC: 13 MMOL/L — SIGNIFICANT CHANGE UP (ref 7–14)
BASOPHILS # BLD AUTO: 0.19 K/UL — SIGNIFICANT CHANGE UP (ref 0–0.2)
BASOPHILS NFR BLD AUTO: 1.4 % — SIGNIFICANT CHANGE UP (ref 0–2)
BUN SERPL-MCNC: 13 MG/DL — SIGNIFICANT CHANGE UP (ref 7–23)
CALCIUM SERPL-MCNC: 8.3 MG/DL — LOW (ref 8.4–10.5)
CHLORIDE SERPL-SCNC: 101 MMOL/L — SIGNIFICANT CHANGE UP (ref 98–107)
CO2 SERPL-SCNC: 26 MMOL/L — SIGNIFICANT CHANGE UP (ref 22–31)
CREAT SERPL-MCNC: 0.87 MG/DL — SIGNIFICANT CHANGE UP (ref 0.5–1.3)
EGFR: 66 ML/MIN/1.73M2 — SIGNIFICANT CHANGE UP
EGFR: 66 ML/MIN/1.73M2 — SIGNIFICANT CHANGE UP
EOSINOPHIL # BLD AUTO: 0.12 K/UL — SIGNIFICANT CHANGE UP (ref 0–0.5)
EOSINOPHIL NFR BLD AUTO: 0.9 % — SIGNIFICANT CHANGE UP (ref 0–6)
GLUCOSE SERPL-MCNC: 102 MG/DL — HIGH (ref 70–99)
HCT VFR BLD CALC: 25.3 % — LOW (ref 34.5–45)
HGB BLD-MCNC: 7.6 G/DL — LOW (ref 11.5–15.5)
IANC: 8.31 K/UL — HIGH (ref 1.8–7.4)
IMM GRANULOCYTES NFR BLD AUTO: 7.6 % — HIGH (ref 0–0.9)
LYMPHOCYTES # BLD AUTO: 1.16 K/UL — SIGNIFICANT CHANGE UP (ref 1–3.3)
LYMPHOCYTES # BLD AUTO: 8.3 % — LOW (ref 13–44)
MAGNESIUM SERPL-MCNC: 2 MG/DL — SIGNIFICANT CHANGE UP (ref 1.6–2.6)
MCHC RBC-ENTMCNC: 16.5 PG — LOW (ref 27–34)
MCHC RBC-ENTMCNC: 30 G/DL — LOW (ref 32–36)
MCV RBC AUTO: 55 FL — LOW (ref 80–100)
MONOCYTES # BLD AUTO: 3.06 K/UL — HIGH (ref 0–0.9)
MONOCYTES NFR BLD AUTO: 22 % — HIGH (ref 2–14)
NEUTROPHILS # BLD AUTO: 8.31 K/UL — HIGH (ref 1.8–7.4)
NEUTROPHILS NFR BLD AUTO: 59.8 % — SIGNIFICANT CHANGE UP (ref 43–77)
NRBC # BLD AUTO: 0 K/UL — SIGNIFICANT CHANGE UP (ref 0–0)
NRBC # FLD: 0 K/UL — SIGNIFICANT CHANGE UP (ref 0–0)
NRBC BLD AUTO-RTO: 0 /100 WBCS — SIGNIFICANT CHANGE UP (ref 0–0)
PHOSPHATE SERPL-MCNC: 4.4 MG/DL — SIGNIFICANT CHANGE UP (ref 2.5–4.5)
PLATELET # BLD AUTO: 422 K/UL — HIGH (ref 150–400)
POTASSIUM SERPL-MCNC: 3.5 MMOL/L — SIGNIFICANT CHANGE UP (ref 3.5–5.3)
POTASSIUM SERPL-SCNC: 3.5 MMOL/L — SIGNIFICANT CHANGE UP (ref 3.5–5.3)
RBC # BLD: 4.6 M/UL — SIGNIFICANT CHANGE UP (ref 3.8–5.2)
RBC # FLD: 20.3 % — HIGH (ref 10.3–14.5)
SODIUM SERPL-SCNC: 140 MMOL/L — SIGNIFICANT CHANGE UP (ref 135–145)
WBC # BLD: 13.9 K/UL — HIGH (ref 3.8–10.5)
WBC # FLD AUTO: 13.9 K/UL — HIGH (ref 3.8–10.5)

## 2025-04-16 PROCEDURE — 99233 SBSQ HOSP IP/OBS HIGH 50: CPT

## 2025-04-16 RX ADMIN — Medication 1 DOSE(S): at 22:20

## 2025-04-16 RX ADMIN — ENOXAPARIN SODIUM 40 MILLIGRAM(S): 100 INJECTION SUBCUTANEOUS at 17:55

## 2025-04-16 RX ADMIN — CEFEPIME 100 MILLIGRAM(S): 2 INJECTION, POWDER, FOR SOLUTION INTRAVENOUS at 22:20

## 2025-04-16 RX ADMIN — Medication 1 DOSE(S): at 09:43

## 2025-04-16 RX ADMIN — CEFEPIME 100 MILLIGRAM(S): 2 INJECTION, POWDER, FOR SOLUTION INTRAVENOUS at 09:44

## 2025-04-16 RX ADMIN — ENOXAPARIN SODIUM 40 MILLIGRAM(S): 100 INJECTION SUBCUTANEOUS at 05:30

## 2025-04-16 RX ADMIN — IPRATROPIUM BROMIDE AND ALBUTEROL SULFATE 3 MILLILITER(S): .5; 2.5 SOLUTION RESPIRATORY (INHALATION) at 21:01

## 2025-04-16 RX ADMIN — IPRATROPIUM BROMIDE AND ALBUTEROL SULFATE 3 MILLILITER(S): .5; 2.5 SOLUTION RESPIRATORY (INHALATION) at 02:43

## 2025-04-16 RX ADMIN — LOSARTAN POTASSIUM 50 MILLIGRAM(S): 100 TABLET, FILM COATED ORAL at 05:30

## 2025-04-16 RX ADMIN — Medication 40 MILLIGRAM(S): at 05:30

## 2025-04-16 RX ADMIN — IPRATROPIUM BROMIDE AND ALBUTEROL SULFATE 3 MILLILITER(S): .5; 2.5 SOLUTION RESPIRATORY (INHALATION) at 09:25

## 2025-04-16 RX ADMIN — GABAPENTIN 100 MILLIGRAM(S): 400 CAPSULE ORAL at 17:55

## 2025-04-16 RX ADMIN — Medication 2 TABLET(S): at 22:29

## 2025-04-16 RX ADMIN — ROSUVASTATIN CALCIUM 40 MILLIGRAM(S): 20 TABLET, FILM COATED ORAL at 22:20

## 2025-04-16 RX ADMIN — GABAPENTIN 100 MILLIGRAM(S): 400 CAPSULE ORAL at 05:29

## 2025-04-16 NOTE — PROGRESS NOTE ADULT - ASSESSMENT
84F with hx of TAVR (2019), COPD (on PRN O2), HLD, S1 Fracture (02/2025 no surgical intervention) initially admitted to [facility] 3/31-4/4 for fever, found to have new LLL mass like consolidation, transferred to [facility] for bronchoscopy with interventional pulmonology- path consistent with SCC, negative bone scan, MRI brain limited study but no definitive evidence of metastatic disease, hosp. course complicated by HAP, now on cefepime, comfortable on NC 2L. No further inpatient heme/onc work-up is planned during this hospitalization.  Pending NIKHIL placement

## 2025-04-16 NOTE — PROGRESS NOTE ADULT - PROBLEM SELECTOR PLAN 10
DVT ppx: lovenox  DIET: DASH   DISPO:  Rehab pending MRI   plan of care d/w pt at bedside    4/9 - case d/w daughter on the phone , update provided  4/11 : case d/w son at bedside    4/13: case d/w daughter on the phone , updates provided   4/15 tried calling daughter multiple times, no answer  4/16 spoke with daughter in person, all questions answered, family updated on plan of care and discharge planning and are agreeable with the plan.   - case d/w ACP

## 2025-04-16 NOTE — CHART NOTE - NSCHARTNOTEFT_GEN_A_CORE
D/W oncology. They will arrange for medical oncology follow up at [facility] upon discharge from rehab center. Patient will not be receiving any oncological/chemo medications while at rehab. PET to be done as outpatient. No role for inpatient PET.

## 2025-04-16 NOTE — PROGRESS NOTE ADULT - SUBJECTIVE AND OBJECTIVE BOX
[facility] Division of Hospital Medicine  Alejandro Finch MD   Available on TEAMS    Patient is a 84y old  Female who presents with a chief complaint of transfer from [facility] (09 Apr 2025 09:34)      SUBJECTIVE / OVERNIGHT EVENTS:  patient seen and examined by bedside, NAD, denies worsening SOB. No CP. No new fevers.     MEDICATIONS  (STANDING):  albuterol/ipratropium for Nebulization 3 milliLiter(s) Nebulizer every 6 hours  benzocaine/menthol Lozenge 1 Lozenge Oral two times a day  cefepime   IVPB      cefepime   IVPB 2000 milliGRAM(s) IV Intermittent every 12 hours  enoxaparin Injectable 40 milliGRAM(s) SubCutaneous every 12 hours  fluticasone propionate/ salmeterol 250-50 MICROgram(s) Diskus 1 Dose(s) Inhalation two times a day  gabapentin 100 milliGRAM(s) Oral two times a day  hydrocodone/homatropine Syrup 5 milliLiter(s) Oral two times a day  lidocaine   4% Patch 1 Patch Transdermal daily  losartan 50 milliGRAM(s) Oral daily  pantoprazole    Tablet 40 milliGRAM(s) Oral before breakfast  polyethylene glycol 3350 17 Gram(s) Oral daily  rosuvastatin 40 milliGRAM(s) Oral at bedtime  senna 2 Tablet(s) Oral at bedtime    MEDICATIONS  (PRN):  acetaminophen     Tablet .. 650 milliGRAM(s) Oral every 6 hours PRN Temp greater or equal to 38C (100.4F), Mild Pain (1 - 3), Moderate Pain (4 - 6)  bismuth subsalicylate Liquid 15 milliLiter(s) Oral every 8 hours PRN ingestion  melatonin 3 milliGRAM(s) Oral at bedtime PRN Insomnia  promethazine 25 milliGRAM(s) Oral every 6 hours PRN allergies      Vital Signs Last 24 Hrs  T(C): 37.4 (16 Apr 2025 18:00), Max: 37.4 (16 Apr 2025 18:00)  T(F): 99.3 (16 Apr 2025 18:00), Max: 99.3 (16 Apr 2025 18:00)  HR: 89 (16 Apr 2025 18:00) (84 - 94)  BP: 137/54 (16 Apr 2025 18:00) (123/46 - 140/52)  BP(mean): --  RR: 17 (16 Apr 2025 18:00) (17 - 18)  SpO2: 100% (16 Apr 2025 18:00) (96% - 100%)    Parameters below as of 16 Apr 2025 18:00  Patient On (Oxygen Delivery Method): nasal cannula  O2 Flow (L/min): 2      PHYSICAL EXAM:  CONSTITUTIONAL: NAD,  EYES: PERRLA; conjunctiva and sclera clear  NECK: Supple, no palpable masses;  RESPIRATORY: Normal respiratory effort; basilar crackles  bilaterally  CARDIOVASCULAR: Regular rate and rhythm, normal S1 and S2, no murmur/rub/gallop;   ABDOMEN: Nontender to palpation, normoactive bowel sounds, no rebound/guarding;   MUSCULOSKELETAL:    no clubbing or cyanosis of digits; no joint swelling or tenderness to palpation  PSYCH: A+O to person, place, and time; affect appropriate  NEUROLOGY: CN 2-12 are intact and symmetric; no gross sensory deficits;         LABS:                        7.6    13.90 )-----------( 422      ( 16 Apr 2025 05:51 )             25.3     04-16    140  |  101  |  13  ----------------------------<  102[H]  3.5   |  26  |  0.87    Ca    8.3[L]      16 Apr 2025 05:51  Phos  4.4     04-16  Mg     2.00     04-16        LABS:                    8.0    16.65 )-----------( 442      ( 15 Apr 2025 05:30 )             26.4     04-15    138  |  98  |  12  ----------------------------<  87  3.8   |  28  |  0.90    Ca    8.4      15 Apr 2025 05:30  Phos  4.3     04-15  Mg     1.80     04-15    TPro  6.6  /  Alb  3.2[L]  /  TBili  0.5  /  DBili  x   /  AST  14  /  ALT  9   /  AlkPhos  62  04-13      LABS:               8.1    16.99 )-----------( 460      ( 14 Apr 2025 06:20 )             27.1     04-14    141  |  99  |  10  ----------------------------<  108[H]  3.7   |  30  |  0.83    Ca    8.5      14 Apr 2025 06:20  Phos  4.5     04-14  Mg     1.90     04-14    TPro  6.6  /  Alb  3.2[L]  /  TBili  0.5  /  DBili  x   /  AST  14  /  ALT  9   /  AlkPhos  62  04-13      LABS:                        8.2    17.27 )-----------( 471      ( 13 Apr 2025 05:55 )             27.7     04-13    144  |  103  |  8   ----------------------------<  93  3.5   |  30  |  0.79    Ca    8.6      13 Apr 2025 05:55  Phos  4.3     04-13  Mg     1.90     04-13    CAPILLARY BLOOD GLUCOSE  CAPILLARY BLOOD GLUCOSE      POCT Blood Glucose.: 121 mg/dL (14 Apr 2025 11:57)  POCT Blood Glucose.: 104 mg/dL (14 Apr 2025 08:14)        Urinalysis Basic - ( 13 Apr 2025 05:55 )    Color: x / Appearance: x / SG: x / pH: x  Gluc: 93 mg/dL / Ketone: x  / Bili: x / Urobili: x   Blood: x / Protein: x / Nitrite: x   Leuk Esterase: x / RBC: x / WBC x   Sq Epi: x / Non Sq Epi: x / Bacteria: x    Urinalysis Basic - ( 14 Apr 2025 06:20 )    Color: x / Appearance: x / SG: x / pH: x  Gluc: 108 mg/dL / Ketone: x  / Bili: x / Urobili: x   Blood: x / Protein: x / Nitrite: x   Leuk Esterase: x / RBC: x / WBC x   Sq Epi: x / Non Sq Epi: x / Bacteria: x    Urine Microscopic-Add On (NC) (04.14.25 @ 02:52)   White Blood Cell - Urine: 1 /HPF  Red Blood Cell - Urine: 4 /HPF  Bacteria: Moderate /HPF  Cast: 0 /LPF  Epithelial Cells: 2 /HPF  Yeast-like Cells: Present  Calcium Oxalate Crystals: Present  Uric Acid Crystals: Present  Review: ReviewedCulture - Acid Fast - Tissue w/Smear (04.07.25 @ 18:22)   Specimen Source: Tissue LEFT LOWER LOBE FORCEP BIO  Acid Fast Bacilli Smear:   No acid-fast bacilli seen by fluorochrome stain  Culture Results:   Culture is being performed.Culture - Blood (04.07.25 @ 18:10)   Specimen Source: Blood Blood-Peripheral  Culture Results:   No growth at 5 days      Culture - Blood (collected 13 Apr 2025 19:40)  Source: Blood Blood-Peripheral  Preliminary Report (14 Apr 2025 22:02):    No growth at 24 hours    Culture - Blood (collected 13 Apr 2025 19:10)  Source: Blood Blood-Peripheral  Preliminary Report (14 Apr 2025 22:02):    No growth at 24 hours        RADIOLOGY & ADDITIONAL TESTS:    < from: NM Bone Imaging Total (04.12.25 @ 14:40) >  COMPARISON: None  OTHER STUDIES USED FOR CORRELATION: CT 4/1/2025  FINDINGS:  No random pattern of multifocal uptake that would suggest bone metastases.  Benign variant skull uptake.  Uptake around major joints is degenerative.  Focus of uptake in the upper thoracic spine is likely degenerative.  Focus of uptake in the lumbar spine correlates with degenerative changes   on CT.  Both kidneys are visualized.    IMPRESSION: No evidence of osseous metastasis.  < end of copied text >       TTE W or WO Ultrasound Enhancing Agent (04.09.25 @ 15:12) >  CONCLUSIONS:   1. Left ventricular cavity is normal in size. Left ventricular systolic function is normal with an ejection fraction of 67 % by Yan's method of disks.   2. There is mild (grade 1) left ventricular diastolic dysfunction.   3. Normal right ventricular cavity size and normal right ventricular systolic function.   4. Left atrium is normal in size.   5. The right atrium is dilated.   6. A a transcatheter deployed (TAVR) is present in theaortic position. The peak transaortic velocity is 2.07 m/s, peak transaortic gradient is 17.1 mmHg and mean transaortic gradient is 10.0 mmHg with an LVOT/aortic valve VTI ratio of 0.66. There is no regurgitation.   7. No pericardial effusion seen.  8. Estimated pulmonary artery systolic pressure is 48 mmHg.   9. The inferior vena cava is normal in size measuring 2.02 cm in diameter, (normal <2.1cm) with abnormal inspiratory collapse (abnormal <50%) consistent with mildly elevated right atrial pressure (~8, range 5-10mmHg).    < end of copied text >    < from: Xray Chest 1 View- PORTABLE-Urgent (Xray Chest 1 View- PORTABLE-Urgent .) (04.13.25 @ 20:37) >  IMPRESSION:  Left mid and lower lung opacities. Prior TAVR.  ED was informed through the [facility]    --- End of Report ---  < end of copied text >      < from: MR Head w/wo IV Cont (04.15.25 @ 17:23) >  IMPRESSION: MOTION LIMITED STUDY. NO DEFINITE EVIDENCE OF INTRACRANIAL   HEMORRHAGE, ACUTE TERRITORIAL INFARCT OR AREA OF ABNORMAL ENHANCEMENT. NO   DEFINITE EVIDENCE OF INTRACRANIAL METASTATIC DISEASE.  --- End of Report ---    < end of copied text >

## 2025-04-16 NOTE — PROGRESS NOTE ADULT - PROBLEM SELECTOR PLAN 3
febrile on admission to [facility]  - infectious workup unrevealing - BCx/UA/CXR neg, QuantiFERON-TB Gold, fungal and viral serology pending  - CT chest with LLL lung nodule - management as below, CT CAP without infectious etiology   - ID (Dr. Bui) evaluated at [facility] - rec flow cytometry, AFB, bacterial and fungal cx to be sent with biopsy, , , BAL negative for infection at this time , will f/u cytopathology   -on  Zosyn (planned for empiric 7 day course per ID at [facility]) -   -pt had worsening leucocytosis  ,now improving ,  per daughter  her baseline is 14-15  - pt with  EBV IGM+ but no PCR detected ,  -ID f/u appreciated, leucocytosis likely reactive vs 2/2 malignancy  - ID rec to monitor off abx , Dced zosyn as pt completed 7 days of empiric treatment

## 2025-04-16 NOTE — PROGRESS NOTE ADULT - PROBLEM SELECTOR PLAN 6
improving (was 65 on admission)  - infectious workup unrevealing - BCx/UA/CXR neg, QuantiFERON-TB Gold, fungal and viral serology pending  - ID evaluated at [facility] - empric zosyn x 7 days, rec flow cytometry, AFB, bacterial and fungal cx to be sent with biopsy  pt had worsening leucocytosis  , per daughter  her baseline is 14-15  - pt with  EBV IGM+ but no PCR detected ,  -ID f/u appreciated, leucocytosis likely reactive vs 2/2 malignancy  - ID rec to monitor off abx , will dc zosyn as pt completed 7 days of empiric treatment  - trend WBC

## 2025-04-16 NOTE — PROGRESS NOTE ADULT - PROBLEM SELECTOR PLAN 2
LLL noted on imaging  - CT with 2.4cm lung nodule  - IR unable to obtain percutaneous biopsy  - s/p navigational bronchoscopy  by IP on 4/7/25 with transbronchial biopsy of the LLL, EBUS with lymph node biopsies and BAL. 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 BAL neg for infection. Lymph node biopsy: Negative for malignancy.  -oncology consulted, rec MR head w w/o contrast and bone scan to evaluate for any metastatic disease given recent falls/fracture hx.   Bone scan negative for mets   MRI brain limited study but no definitive evidence of metastatic disease. No further inpatient heme/onc work-up is planned during this hospitalization.  Pending NIKHIL placement  PET/CT can be done outpatient to guide therapy options. Outpatient f/u with oncology will be provided prior to discharge.

## 2025-04-16 NOTE — PROGRESS NOTE ADULT - PROBLEM SELECTOR PLAN 1
Pt developed new fever, infectious work-up revealed new L lung opacities. Pt started on Vanc and Cefepime for HAP. - cont. cefepime  - MRSA swab - neg. DC vanc.  - tylenol prn for fever  - monitor resp.status. - currently on 2L NC (baseline home O2 requirement)

## 2025-04-17 LAB
ANION GAP SERPL CALC-SCNC: 12 MMOL/L — SIGNIFICANT CHANGE UP (ref 7–14)
BASOPHILS # BLD AUTO: 0.19 K/UL — SIGNIFICANT CHANGE UP (ref 0–0.2)
BASOPHILS NFR BLD AUTO: 1.6 % — SIGNIFICANT CHANGE UP (ref 0–2)
BLD GP AB SCN SERPL QL: NEGATIVE — SIGNIFICANT CHANGE UP
BUN SERPL-MCNC: 13 MG/DL — SIGNIFICANT CHANGE UP (ref 7–23)
CALCIUM SERPL-MCNC: 8.4 MG/DL — SIGNIFICANT CHANGE UP (ref 8.4–10.5)
CHLORIDE SERPL-SCNC: 101 MMOL/L — SIGNIFICANT CHANGE UP (ref 98–107)
CO2 SERPL-SCNC: 28 MMOL/L — SIGNIFICANT CHANGE UP (ref 22–31)
CREAT SERPL-MCNC: 0.8 MG/DL — SIGNIFICANT CHANGE UP (ref 0.5–1.3)
EGFR: 73 ML/MIN/1.73M2 — SIGNIFICANT CHANGE UP
EGFR: 73 ML/MIN/1.73M2 — SIGNIFICANT CHANGE UP
EOSINOPHIL # BLD AUTO: 0.1 K/UL — SIGNIFICANT CHANGE UP (ref 0–0.5)
EOSINOPHIL NFR BLD AUTO: 0.8 % — SIGNIFICANT CHANGE UP (ref 0–6)
GLUCOSE SERPL-MCNC: 108 MG/DL — HIGH (ref 70–99)
HCT VFR BLD CALC: 26.1 % — LOW (ref 34.5–45)
HGB BLD-MCNC: 7.8 G/DL — LOW (ref 11.5–15.5)
IANC: 7 K/UL — SIGNIFICANT CHANGE UP (ref 1.8–7.4)
IMM GRANULOCYTES NFR BLD AUTO: 8.7 % — HIGH (ref 0–0.9)
LYMPHOCYTES # BLD AUTO: 1.18 K/UL — SIGNIFICANT CHANGE UP (ref 1–3.3)
LYMPHOCYTES # BLD AUTO: 9.7 % — LOW (ref 13–44)
MAGNESIUM SERPL-MCNC: 2 MG/DL — SIGNIFICANT CHANGE UP (ref 1.6–2.6)
MCHC RBC-ENTMCNC: 16.9 PG — LOW (ref 27–34)
MCHC RBC-ENTMCNC: 29.9 G/DL — LOW (ref 32–36)
MCV RBC AUTO: 56.5 FL — LOW (ref 80–100)
MONOCYTES # BLD AUTO: 2.62 K/UL — HIGH (ref 0–0.9)
MONOCYTES NFR BLD AUTO: 21.6 % — HIGH (ref 2–14)
NEUTROPHILS # BLD AUTO: 7 K/UL — SIGNIFICANT CHANGE UP (ref 1.8–7.4)
NEUTROPHILS NFR BLD AUTO: 57.6 % — SIGNIFICANT CHANGE UP (ref 43–77)
NRBC # BLD AUTO: 0 K/UL — SIGNIFICANT CHANGE UP (ref 0–0)
NRBC # FLD: 0 K/UL — SIGNIFICANT CHANGE UP (ref 0–0)
NRBC BLD AUTO-RTO: 0 /100 WBCS — SIGNIFICANT CHANGE UP (ref 0–0)
PHOSPHATE SERPL-MCNC: 3.7 MG/DL — SIGNIFICANT CHANGE UP (ref 2.5–4.5)
PLATELET # BLD AUTO: 416 K/UL — HIGH (ref 150–400)
POTASSIUM SERPL-MCNC: 3.2 MMOL/L — LOW (ref 3.5–5.3)
POTASSIUM SERPL-SCNC: 3.2 MMOL/L — LOW (ref 3.5–5.3)
RBC # BLD: 4.62 M/UL — SIGNIFICANT CHANGE UP (ref 3.8–5.2)
RBC # FLD: 20.8 % — HIGH (ref 10.3–14.5)
RH IG SCN BLD-IMP: POSITIVE — SIGNIFICANT CHANGE UP
SODIUM SERPL-SCNC: 141 MMOL/L — SIGNIFICANT CHANGE UP (ref 135–145)
WBC # BLD: 12.15 K/UL — HIGH (ref 3.8–10.5)
WBC # FLD AUTO: 12.15 K/UL — HIGH (ref 3.8–10.5)

## 2025-04-17 PROCEDURE — 99233 SBSQ HOSP IP/OBS HIGH 50: CPT

## 2025-04-17 RX ADMIN — ROSUVASTATIN CALCIUM 40 MILLIGRAM(S): 20 TABLET, FILM COATED ORAL at 22:51

## 2025-04-17 RX ADMIN — LOSARTAN POTASSIUM 50 MILLIGRAM(S): 100 TABLET, FILM COATED ORAL at 06:07

## 2025-04-17 RX ADMIN — Medication 1 LOZENGE: at 06:08

## 2025-04-17 RX ADMIN — GABAPENTIN 100 MILLIGRAM(S): 400 CAPSULE ORAL at 06:08

## 2025-04-17 RX ADMIN — ENOXAPARIN SODIUM 40 MILLIGRAM(S): 100 INJECTION SUBCUTANEOUS at 06:08

## 2025-04-17 RX ADMIN — Medication 40 MILLIGRAM(S): at 06:11

## 2025-04-17 RX ADMIN — CEFEPIME 100 MILLIGRAM(S): 2 INJECTION, POWDER, FOR SOLUTION INTRAVENOUS at 10:15

## 2025-04-17 RX ADMIN — ENOXAPARIN SODIUM 40 MILLIGRAM(S): 100 INJECTION SUBCUTANEOUS at 17:48

## 2025-04-17 RX ADMIN — IPRATROPIUM BROMIDE AND ALBUTEROL SULFATE 3 MILLILITER(S): .5; 2.5 SOLUTION RESPIRATORY (INHALATION) at 16:15

## 2025-04-17 RX ADMIN — IPRATROPIUM BROMIDE AND ALBUTEROL SULFATE 3 MILLILITER(S): .5; 2.5 SOLUTION RESPIRATORY (INHALATION) at 19:50

## 2025-04-17 RX ADMIN — GABAPENTIN 100 MILLIGRAM(S): 400 CAPSULE ORAL at 17:48

## 2025-04-17 RX ADMIN — CEFEPIME 100 MILLIGRAM(S): 2 INJECTION, POWDER, FOR SOLUTION INTRAVENOUS at 22:51

## 2025-04-17 RX ADMIN — IPRATROPIUM BROMIDE AND ALBUTEROL SULFATE 3 MILLILITER(S): .5; 2.5 SOLUTION RESPIRATORY (INHALATION) at 09:27

## 2025-04-17 RX ADMIN — Medication 40 MILLIEQUIVALENT(S): at 11:05

## 2025-04-17 NOTE — PROGRESS NOTE ADULT - PROBLEM SELECTOR PLAN 6
improving (was 65 on admission)  - infectious workup unrevealing - BCx/UA/CXR neg, QuantiFERON-TB Gold, fungal and viral serology pending  - ID evaluated at [facility] - empric zosyn x 7 days, rec flow cytometry, AFB, bacterial and fungal cx to be sent with biopsy  pt had worsening leucocytosis  , per daughter  her baseline is 14-15  - pt with  EBV IGM+ but no PCR detected ,  -ID f/u appreciated, leucocytosis likely reactive vs 2/2 malignancy  - ID rec to monitor off abx , will dc zosyn as pt completed 7 days of empiric treatment  - trend WBC

## 2025-04-17 NOTE — PROGRESS NOTE ADULT - PROBLEM SELECTOR PLAN 3
febrile on admission to [facility]  - infectious workup unrevealing - BCx/UA/CXR neg, QuantiFERON-TB Gold, fungal and viral serology pending  - CT chest with LLL lung nodule - management as below, CT CAP without infectious etiology   - ID (Dr. Bui) evaluated at [facility] - rec flow cytometry, AFB, bacterial and fungal cx to be sent with biopsy, , , BAL negative for infection at this time , will f/u cytopathology   -on  Zosyn (planned for empiric 7 day course per ID at [facility]) -   -pt had worsening leucocytosis  ,now improving ,  per daughter  her baseline is 14-15  - pt with  EBV IGM+ but no PCR detected ,  -ID f/u appreciated, leucocytosis likely reactive vs 2/2 malignancy  - ID rec to monitor off abx , Dced zosyn as pt completed 7 days of empiric treatment

## 2025-04-17 NOTE — PROGRESS NOTE ADULT - SUBJECTIVE AND OBJECTIVE BOX
[facility] Division of Hospital Medicine  Alejandro Finch MD   Available on TEAMS    Patient is a 84y old  Female who presents with a chief complaint of transfer from [facility] (09 Apr 2025 09:34)      SUBJECTIVE / OVERNIGHT EVENTS:  patient seen and examined by bedside, NAD, denies worsening SOB. No CP. No new fevers.     MEDICATIONS  (STANDING):  albuterol/ipratropium for Nebulization 3 milliLiter(s) Nebulizer every 6 hours  benzocaine/menthol Lozenge 1 Lozenge Oral two times a day  cefepime   IVPB      cefepime   IVPB 2000 milliGRAM(s) IV Intermittent every 12 hours  enoxaparin Injectable 40 milliGRAM(s) SubCutaneous every 12 hours  fluticasone propionate/ salmeterol 250-50 MICROgram(s) Diskus 1 Dose(s) Inhalation two times a day  gabapentin 100 milliGRAM(s) Oral two times a day  hydrocodone/homatropine Syrup 5 milliLiter(s) Oral two times a day  lidocaine   4% Patch 1 Patch Transdermal daily  losartan 50 milliGRAM(s) Oral daily  pantoprazole    Tablet 40 milliGRAM(s) Oral before breakfast  polyethylene glycol 3350 17 Gram(s) Oral daily  rosuvastatin 40 milliGRAM(s) Oral at bedtime  senna 2 Tablet(s) Oral at bedtime    MEDICATIONS  (PRN):  acetaminophen     Tablet .. 650 milliGRAM(s) Oral every 6 hours PRN Temp greater or equal to 38C (100.4F), Mild Pain (1 - 3), Moderate Pain (4 - 6)  bismuth subsalicylate Liquid 15 milliLiter(s) Oral every 8 hours PRN ingestion  melatonin 3 milliGRAM(s) Oral at bedtime PRN Insomnia  promethazine 25 milliGRAM(s) Oral every 6 hours PRN allergies      Vital Signs Last 24 Hrs  T(C): 36.8 (17 Apr 2025 18:00), Max: 36.9 (17 Apr 2025 06:05)  T(F): 98.2 (17 Apr 2025 18:00), Max: 98.4 (17 Apr 2025 06:05)  HR: 87 (17 Apr 2025 18:00) (82 - 92)  BP: 122/41 (17 Apr 2025 18:00) (119/51 - 138/61)  BP(mean): --  RR: 17 (17 Apr 2025 18:00) (17 - 18)  SpO2: 97% (17 Apr 2025 18:00) (95% - 97%)    Parameters below as of 17 Apr 2025 18:00  Patient On (Oxygen Delivery Method): nasal cannula        PHYSICAL EXAM:  CONSTITUTIONAL: NAD,  EYES: PERRLA; conjunctiva and sclera clear  NECK: Supple, no palpable masses;  RESPIRATORY: Normal respiratory effort; basilar crackles  bilaterally  CARDIOVASCULAR: Regular rate and rhythm, normal S1 and S2, no murmur/rub/gallop;   ABDOMEN: Nontender to palpation, normoactive bowel sounds, no rebound/guarding;   MUSCULOSKELETAL:    no clubbing or cyanosis of digits; no joint swelling or tenderness to palpation  PSYCH: A+O to person, place, and time; affect appropriate  NEUROLOGY: CN 2-12 are intact and symmetric; no gross sensory deficits;         LABS:  cret                        7.8    12.15 )-----------( 416      ( 17 Apr 2025 06:33 )             26.1     04-17    141  |  101  |  13  ----------------------------<  108[H]  3.2[L]   |  28  |  0.80    Ca    8.4      17 Apr 2025 06:33  Phos  3.7     04-17  Mg     2.00     04-17        LABS:                        7.6    13.90 )-----------( 422      ( 16 Apr 2025 05:51 )             25.3     04-16    140  |  101  |  13  ----------------------------<  102[H]  3.5   |  26  |  0.87    Ca    8.3[L]      16 Apr 2025 05:51  Phos  4.4     04-16  Mg     2.00     04-16        LABS:                    8.0    16.65 )-----------( 442      ( 15 Apr 2025 05:30 )             26.4     04-15    138  |  98  |  12  ----------------------------<  87  3.8   |  28  |  0.90    Ca    8.4      15 Apr 2025 05:30  Phos  4.3     04-15  Mg     1.80     04-15    TPro  6.6  /  Alb  3.2[L]  /  TBili  0.5  /  DBili  x   /  AST  14  /  ALT  9   /  AlkPhos  62  04-13      LABS:               8.1    16.99 )-----------( 460      ( 14 Apr 2025 06:20 )             27.1     04-14    141  |  99  |  10  ----------------------------<  108[H]  3.7   |  30  |  0.83    Ca    8.5      14 Apr 2025 06:20  Phos  4.5     04-14  Mg     1.90     04-14    TPro  6.6  /  Alb  3.2[L]  /  TBili  0.5  /  DBili  x   /  AST  14  /  ALT  9   /  AlkPhos  62  04-13      LABS:                        8.2    17.27 )-----------( 471      ( 13 Apr 2025 05:55 )             27.7     04-13    144  |  103  |  8   ----------------------------<  93  3.5   |  30  |  0.79    Ca    8.6      13 Apr 2025 05:55  Phos  4.3     04-13  Mg     1.90     04-13      Urinalysis Basic - ( 13 Apr 2025 05:55 )    Color: x / Appearance: x / SG: x / pH: x  Gluc: 93 mg/dL / Ketone: x  / Bili: x / Urobili: x   Blood: x / Protein: x / Nitrite: x   Leuk Esterase: x / RBC: x / WBC x   Sq Epi: x / Non Sq Epi: x / Bacteria: x    Urinalysis Basic - ( 14 Apr 2025 06:20 )    Color: x / Appearance: x / SG: x / pH: x  Gluc: 108 mg/dL / Ketone: x  / Bili: x / Urobili: x   Blood: x / Protein: x / Nitrite: x   Leuk Esterase: x / RBC: x / WBC x   Sq Epi: x / Non Sq Epi: x / Bacteria: x    Urine Microscopic-Add On (NC) (04.14.25 @ 02:52)   White Blood Cell - Urine: 1 /HPF  Red Blood Cell - Urine: 4 /HPF  Bacteria: Moderate /HPF  Cast: 0 /LPF  Epithelial Cells: 2 /HPF  Yeast-like Cells: Present  Calcium Oxalate Crystals: Present  Uric Acid Crystals: Present  Review: ReviewedCulture - Acid Fast - Tissue w/Smear (04.07.25 @ 18:22)   Specimen Source: Tissue LEFT LOWER LOBE FORCEP BIO  Acid Fast Bacilli Smear:   No acid-fast bacilli seen by fluorochrome stain  Culture Results:   Culture is being performed.Culture - Blood (04.07.25 @ 18:10)   Specimen Source: Blood Blood-Peripheral  Culture Results:   No growth at 5 days      Culture - Blood (collected 13 Apr 2025 19:40)  Source: Blood Blood-Peripheral  Preliminary Report (14 Apr 2025 22:02):    No growth at 24 hours    Culture - Blood (collected 13 Apr 2025 19:10)  Source: Blood Blood-Peripheral  Preliminary Report (14 Apr 2025 22:02):    No growth at 24 hours        RADIOLOGY & ADDITIONAL TESTS:    < from: NM Bone Imaging Total (04.12.25 @ 14:40) >  COMPARISON: None  OTHER STUDIES USED FOR CORRELATION: CT 4/1/2025  FINDINGS:  No random pattern of multifocal uptake that would suggest bone metastases.  Benign variant skull uptake.  Uptake around major joints is degenerative.  Focus of uptake in the upper thoracic spine is likely degenerative.  Focus of uptake in the lumbar spine correlates with degenerative changes   on CT.  Both kidneys are visualized.    IMPRESSION: No evidence of osseous metastasis.  < end of copied text >       TTE W or WO Ultrasound Enhancing Agent (04.09.25 @ 15:12) >  CONCLUSIONS:   1. Left ventricular cavity is normal in size. Left ventricular systolic function is normal with an ejection fraction of 67 % by Yan's method of disks.   2. There is mild (grade 1) left ventricular diastolic dysfunction.   3. Normal right ventricular cavity size and normal right ventricular systolic function.   4. Left atrium is normal in size.   5. The right atrium is dilated.   6. A a transcatheter deployed (TAVR) is present in theaortic position. The peak transaortic velocity is 2.07 m/s, peak transaortic gradient is 17.1 mmHg and mean transaortic gradient is 10.0 mmHg with an LVOT/aortic valve VTI ratio of 0.66. There is no regurgitation.   7. No pericardial effusion seen.  8. Estimated pulmonary artery systolic pressure is 48 mmHg.   9. The inferior vena cava is normal in size measuring 2.02 cm in diameter, (normal <2.1cm) with abnormal inspiratory collapse (abnormal <50%) consistent with mildly elevated right atrial pressure (~8, range 5-10mmHg).    < end of copied text >    < from: Xray Chest 1 View- PORTABLE-Urgent (Xray Chest 1 View- PORTABLE-Urgent .) (04.13.25 @ 20:37) >  IMPRESSION:  Left mid and lower lung opacities. Prior TAVR.  ED was informed through the [facility]    --- End of Report ---  < end of copied text >      < from: MR Head w/wo IV Cont (04.15.25 @ 17:23) >  IMPRESSION: MOTION LIMITED STUDY. NO DEFINITE EVIDENCE OF INTRACRANIAL   HEMORRHAGE, ACUTE TERRITORIAL INFARCT OR AREA OF ABNORMAL ENHANCEMENT. NO   DEFINITE EVIDENCE OF INTRACRANIAL METASTATIC DISEASE.  --- End of Report ---    < end of copied text >

## 2025-04-17 NOTE — PROGRESS NOTE ADULT - PROBLEM SELECTOR PLAN 1
- EP consult - to be arranged by day team   - continue Eliquis   - continue Propafenone   - continue metoprolol   - telemetry   - replete mag ,goal level > 2 Pt developed new fever, infectious work-up revealed new L lung opacities. Pt started on Vanc and Cefepime for HAP. - cont. cefepime  - MRSA swab - neg. DC vanc.  - tylenol prn for fever  - monitor resp.status. - currently on 2L NC (baseline home O2 requirement)

## 2025-04-17 NOTE — PROGRESS NOTE ADULT - ASSESSMENT
84F with hx of TAVR (2019), COPD (on PRN O2), HLD, S1 Fracture (02/2025 no surgical intervention) initially admitted to [facility] 3/31-4/4 for fever, found to have new LLL mass like consolidation, transferred to [facility] for bronchoscopy with interventional pulmonology- path consistent with SCC, negative bone scan, MRI brain limited study but no definitive evidence of metastatic disease, hosp. course complicated by HAP, now on cefepime, comfortable on NC 2L. No further inpatient heme/onc work-up is planned during this hospitalization.  Pending NIKHIL placement

## 2025-04-18 LAB
ANION GAP SERPL CALC-SCNC: 10 MMOL/L — SIGNIFICANT CHANGE UP (ref 7–14)
ANISOCYTOSIS BLD QL: SIGNIFICANT CHANGE UP
BASOPHILS # BLD AUTO: 0.48 K/UL — HIGH (ref 0–0.2)
BASOPHILS NFR BLD AUTO: 3.5 % — HIGH (ref 0–2)
BUN SERPL-MCNC: 15 MG/DL — SIGNIFICANT CHANGE UP (ref 7–23)
CALCIUM SERPL-MCNC: 8.6 MG/DL — SIGNIFICANT CHANGE UP (ref 8.4–10.5)
CHLORIDE SERPL-SCNC: 102 MMOL/L — SIGNIFICANT CHANGE UP (ref 98–107)
CO2 SERPL-SCNC: 27 MMOL/L — SIGNIFICANT CHANGE UP (ref 22–31)
CREAT SERPL-MCNC: 0.89 MG/DL — SIGNIFICANT CHANGE UP (ref 0.5–1.3)
CULTURE RESULTS: SIGNIFICANT CHANGE UP
CULTURE RESULTS: SIGNIFICANT CHANGE UP
DACRYOCYTES BLD QL SMEAR: SLIGHT — SIGNIFICANT CHANGE UP
EGFR: 64 ML/MIN/1.73M2 — SIGNIFICANT CHANGE UP
EGFR: 64 ML/MIN/1.73M2 — SIGNIFICANT CHANGE UP
ELLIPTOCYTES BLD QL SMEAR: SLIGHT — SIGNIFICANT CHANGE UP
EOSINOPHIL # BLD AUTO: 0 K/UL — SIGNIFICANT CHANGE UP (ref 0–0.5)
EOSINOPHIL NFR BLD AUTO: 0 % — SIGNIFICANT CHANGE UP (ref 0–6)
GIANT PLATELETS BLD QL SMEAR: PRESENT — SIGNIFICANT CHANGE UP
GLUCOSE SERPL-MCNC: 100 MG/DL — HIGH (ref 70–99)
HCT VFR BLD CALC: 26.7 % — LOW (ref 34.5–45)
HGB BLD-MCNC: 7.8 G/DL — LOW (ref 11.5–15.5)
HYPOCHROMIA BLD QL: SIGNIFICANT CHANGE UP
IANC: 7.86 K/UL — HIGH (ref 1.8–7.4)
LYMPHOCYTES # BLD AUTO: 1.2 K/UL — SIGNIFICANT CHANGE UP (ref 1–3.3)
LYMPHOCYTES # BLD AUTO: 8.7 % — LOW (ref 13–44)
MAGNESIUM SERPL-MCNC: 2.1 MG/DL — SIGNIFICANT CHANGE UP (ref 1.6–2.6)
MANUAL SMEAR VERIFICATION: SIGNIFICANT CHANGE UP
MCHC RBC-ENTMCNC: 16.2 PG — LOW (ref 27–34)
MCHC RBC-ENTMCNC: 29.2 G/DL — LOW (ref 32–36)
MCV RBC AUTO: 55.4 FL — LOW (ref 80–100)
MICROCYTES BLD QL: SIGNIFICANT CHANGE UP
MONOCYTES # BLD AUTO: 1.81 K/UL — HIGH (ref 0–0.9)
MONOCYTES NFR BLD AUTO: 13.1 % — SIGNIFICANT CHANGE UP (ref 2–14)
MYELOCYTES NFR BLD: 1.7 % — HIGH (ref 0–0)
NEUTROPHILS # BLD AUTO: 9.88 K/UL — HIGH (ref 1.8–7.4)
NEUTROPHILS NFR BLD AUTO: 69.6 % — SIGNIFICANT CHANGE UP (ref 43–77)
NEUTS BAND # BLD: 1.7 % — SIGNIFICANT CHANGE UP (ref 0–6)
NEUTS BAND NFR BLD: 1.7 % — SIGNIFICANT CHANGE UP (ref 0–6)
OVALOCYTES BLD QL SMEAR: SIGNIFICANT CHANGE UP
PHOSPHATE SERPL-MCNC: 3.9 MG/DL — SIGNIFICANT CHANGE UP (ref 2.5–4.5)
PLAT MORPH BLD: ABNORMAL
PLATELET # BLD AUTO: 428 K/UL — HIGH (ref 150–400)
PLATELET COUNT - ESTIMATE: NORMAL — SIGNIFICANT CHANGE UP
POIKILOCYTOSIS BLD QL AUTO: SIGNIFICANT CHANGE UP
POLYCHROMASIA BLD QL SMEAR: SIGNIFICANT CHANGE UP
POTASSIUM SERPL-MCNC: 3.8 MMOL/L — SIGNIFICANT CHANGE UP (ref 3.5–5.3)
POTASSIUM SERPL-SCNC: 3.8 MMOL/L — SIGNIFICANT CHANGE UP (ref 3.5–5.3)
RBC # BLD: 4.82 M/UL — SIGNIFICANT CHANGE UP (ref 3.8–5.2)
RBC # FLD: 20.5 % — HIGH (ref 10.3–14.5)
RBC BLD AUTO: ABNORMAL
SMUDGE CELLS # BLD: PRESENT — SIGNIFICANT CHANGE UP
SODIUM SERPL-SCNC: 139 MMOL/L — SIGNIFICANT CHANGE UP (ref 135–145)
SPECIMEN SOURCE: SIGNIFICANT CHANGE UP
SPECIMEN SOURCE: SIGNIFICANT CHANGE UP
TARGETS BLD QL SMEAR: SLIGHT — SIGNIFICANT CHANGE UP
VARIANT LYMPHS # BLD: 1.7 % — SIGNIFICANT CHANGE UP (ref 0–6)
VARIANT LYMPHS NFR BLD MANUAL: 1.7 % — SIGNIFICANT CHANGE UP (ref 0–6)
WBC # BLD: 13.85 K/UL — HIGH (ref 3.8–10.5)
WBC # FLD AUTO: 13.85 K/UL — HIGH (ref 3.8–10.5)

## 2025-04-18 PROCEDURE — 99233 SBSQ HOSP IP/OBS HIGH 50: CPT

## 2025-04-18 RX ADMIN — CEFEPIME 100 MILLIGRAM(S): 2 INJECTION, POWDER, FOR SOLUTION INTRAVENOUS at 10:45

## 2025-04-18 RX ADMIN — Medication 1 DOSE(S): at 21:28

## 2025-04-18 RX ADMIN — LOSARTAN POTASSIUM 50 MILLIGRAM(S): 100 TABLET, FILM COATED ORAL at 06:25

## 2025-04-18 RX ADMIN — ENOXAPARIN SODIUM 40 MILLIGRAM(S): 100 INJECTION SUBCUTANEOUS at 17:25

## 2025-04-18 RX ADMIN — GABAPENTIN 100 MILLIGRAM(S): 400 CAPSULE ORAL at 06:24

## 2025-04-18 RX ADMIN — GABAPENTIN 100 MILLIGRAM(S): 400 CAPSULE ORAL at 17:25

## 2025-04-18 RX ADMIN — CEFEPIME 100 MILLIGRAM(S): 2 INJECTION, POWDER, FOR SOLUTION INTRAVENOUS at 21:28

## 2025-04-18 RX ADMIN — IPRATROPIUM BROMIDE AND ALBUTEROL SULFATE 3 MILLILITER(S): .5; 2.5 SOLUTION RESPIRATORY (INHALATION) at 08:13

## 2025-04-18 RX ADMIN — Medication 1 DOSE(S): at 10:45

## 2025-04-18 RX ADMIN — Medication 40 MILLIGRAM(S): at 06:24

## 2025-04-18 RX ADMIN — ROSUVASTATIN CALCIUM 40 MILLIGRAM(S): 20 TABLET, FILM COATED ORAL at 21:28

## 2025-04-18 RX ADMIN — Medication 2 TABLET(S): at 21:28

## 2025-04-18 RX ADMIN — IPRATROPIUM BROMIDE AND ALBUTEROL SULFATE 3 MILLILITER(S): .5; 2.5 SOLUTION RESPIRATORY (INHALATION) at 14:35

## 2025-04-18 RX ADMIN — IPRATROPIUM BROMIDE AND ALBUTEROL SULFATE 3 MILLILITER(S): .5; 2.5 SOLUTION RESPIRATORY (INHALATION) at 20:57

## 2025-04-18 RX ADMIN — Medication 1 LOZENGE: at 06:25

## 2025-04-18 RX ADMIN — ENOXAPARIN SODIUM 40 MILLIGRAM(S): 100 INJECTION SUBCUTANEOUS at 06:24

## 2025-04-18 NOTE — CHART NOTE - NSCHARTNOTEFT_GEN_A_CORE
NUTRITION FOLLOW UP NOTE    Pt seen for nutrition follow-up note.     SOURCE: [X] Patient [X] Medical record [X] RN/PCA     Medical Course: Per chart review, 83 y/o F with hx of TAVR (2019), COPD (on PRN O2), HLD, S1 Fracture (02/2025 no surgical intervention) initially admitted to [facility] 3/31-4/4 for fever, found to have new LLL mass like consolidation, c/f malignancy, infectious workup negative, transferred to [facility] for bronchoscopy with interventional pulmonology-path consistent with SCC, negative bone scan, MRI brain limited study but no definitive evidence of metastatic disease, hospital course complicated by hospital acquired PNA, now on cefepime. No further inpatient heme/onc work-up is planned during this hospitalization.  Pending NIKHIL placement.     Diet Prescription: Diet, DASH/TLC:   Sodium & Cholesterol Restricted (04-04-25 @ 15:30)      Food Allergy/Intolerance: Fish    Nutrition Course: Patient reports good tolerance to diet. Had cereal and juice for breakfast. Will have yogurt later per patient. PO intake mostly 51-75% recorded in nursing flowsheets. Patient denies any nausea/vomiting/diarrhea/constipation or difficulty chewing and swallowing. Last reported bowel movement yesterday. Continue small frequent po intake as tolerated of nutrient and protein dense foods encouraged. RD remains available, patient made aware.       Pertinent Medications: MEDICATIONS  (STANDING):  albuterol/ipratropium for Nebulization 3 milliLiter(s) Nebulizer every 6 hours  benzocaine/menthol Lozenge 1 Lozenge Oral two times a day  cefepime   IVPB      cefepime   IVPB 2000 milliGRAM(s) IV Intermittent every 12 hours  enoxaparin Injectable 40 milliGRAM(s) SubCutaneous every 12 hours  fluticasone propionate/ salmeterol 250-50 MICROgram(s) Diskus 1 Dose(s) Inhalation two times a day  gabapentin 100 milliGRAM(s) Oral two times a day  hydrocodone/homatropine Syrup 5 milliLiter(s) Oral two times a day  lidocaine   4% Patch 1 Patch Transdermal daily  losartan 50 milliGRAM(s) Oral daily  pantoprazole    Tablet 40 milliGRAM(s) Oral before breakfast  polyethylene glycol 3350 17 Gram(s) Oral daily  rosuvastatin 40 milliGRAM(s) Oral at bedtime  senna 2 Tablet(s) Oral at bedtime    MEDICATIONS  (PRN):  acetaminophen     Tablet .. 650 milliGRAM(s) Oral every 6 hours PRN Temp greater or equal to 38C (100.4F), Mild Pain (1 - 3), Moderate Pain (4 - 6)  bismuth subsalicylate Liquid 15 milliLiter(s) Oral every 8 hours PRN ingestion  melatonin 3 milliGRAM(s) Oral at bedtime PRN Insomnia  promethazine 25 milliGRAM(s) Oral every 6 hours PRN allergies        Pertinent Labs: 04-18 Na139 mmol/L Glu 100 mg/dL[H] K+ 3.8 mmol/L Cr  0.89 mg/dL BUN 15 mg/dL 04-18 Phos 3.9 mg/dL 04-13 Alb 3.2 g/dL[L]    Weight (kg): 95 (04-07 @ 17:00) 95.1kg (04-07)   Weight Assessment: No new weight to assess at this time.  Height: 65in   IBW: 125lbs / 56.6kg +/-10%  BMI: 34.9kg/m^2    Physical Assessment, per nursing flowsheets:  Edema: 1+ b/l leg edema noted.  Pressure Injury: No pressure ulcers/DTI noted.    Estimated Needs:   [X] No change since previous assessment, based on IBW-56.6kg   1584-1867kcal daily @ 28-33kcal/kg,  67-79gm protein daily @ 1.2-1.4gm/kg     Previous Nutrition Diagnosis: not applicable    New Nutrition Diagnosis: [X ] not applicable     Education:  [X ]Not applicable-po intake encouraged as above      Interventions:   1) Continue current diet order, which remains appropriate at this time.   2) Please document % PO intake in nursing flowsheet.   3) Monitor weights, labs, BM's, skin integrity, p.o. intake.   4) Honor food and beverage preferences within diet restriction of patient in an effort to maximize level of nutrient intake.  5) Please Encourage po intake, assist with meals and menu selections, provide alternatives PRN.   6) RN to obtain new weight and weekly weights.       Blaine Rai RD, CDN, MS pager [phone]  Also available on [employer] Teams

## 2025-04-18 NOTE — PROGRESS NOTE ADULT - PROBLEM SELECTOR PLAN 3
respiratory status stable, on home O2 2L NC prn  - continue advair BID, duoneb q6h   will dc Guafenesin   will change to Hycodan as pt says it helps her

## 2025-04-18 NOTE — PROGRESS NOTE ADULT - PROBLEM SELECTOR PLAN 8
improving (was 65 on admission)  - infectious workup unrevealing - BCx/UA/CXR neg, QuantiFERON-TB Gold, fungal and viral serology pending  - ID evaluated at [facility] - empric zosyn x 7 days, rec flow cytometry, AFB, bacterial and fungal cx to be sent with biopsy  pt had worsening leucocytosis  , per daughter  her baseline is 14-15  - pt with  EBV IGM+ but no PCR detected ,  - ID f/u appreciated, leucocytosis likely reactive vs 2/2 malignancy  - ID rec to monitor off abx , will dc zosyn as pt completed 7 days of empiric treatment  - trend WBC

## 2025-04-18 NOTE — PROGRESS NOTE ADULT - SUBJECTIVE AND OBJECTIVE BOX
Cong Marcum MD  Interventional Cardiology / Advance Heart Failure and Cardiac Transplant Specialist  [place] Office : [phone] [place] [address] [facility] [address]  Tel:   [place] Office : [address] [facility] [place] [address]  Tel: [phone]       Pt is lying in bed comfortable not in distress, no chest pains no SOB no palpitations  	  MEDICATIONS:  enoxaparin Injectable 40 milliGRAM(s) SubCutaneous every 12 hours  losartan 50 milliGRAM(s) Oral daily    cefepime   IVPB      cefepime   IVPB 2000 milliGRAM(s) IV Intermittent every 12 hours    albuterol/ipratropium for Nebulization 3 milliLiter(s) Nebulizer every 6 hours  fluticasone propionate/ salmeterol 250-50 MICROgram(s) Diskus 1 Dose(s) Inhalation two times a day  hydrocodone/homatropine Syrup 5 milliLiter(s) Oral two times a day    acetaminophen     Tablet .. 650 milliGRAM(s) Oral every 6 hours PRN  gabapentin 100 milliGRAM(s) Oral two times a day  melatonin 3 milliGRAM(s) Oral at bedtime PRN  promethazine 25 milliGRAM(s) Oral every 6 hours PRN    bismuth subsalicylate Liquid 15 milliLiter(s) Oral every 8 hours PRN  pantoprazole    Tablet 40 milliGRAM(s) Oral before breakfast  polyethylene glycol 3350 17 Gram(s) Oral daily  senna 2 Tablet(s) Oral at bedtime    rosuvastatin 40 milliGRAM(s) Oral at bedtime    benzocaine/menthol Lozenge 1 Lozenge Oral two times a day  lidocaine   4% Patch 1 Patch Transdermal daily      PAST MEDICAL/SURGICAL HISTORY  PAST MEDICAL & SURGICAL HISTORY:  HTN (hypertension)      COPD (chronic obstructive pulmonary disease)      HLD (hyperlipidemia)      S/P TAVR (transcatheter aortic valve replacement)          SOCIAL HISTORY: Substance Use (street drugs): ( x ) never used  (  ) other:    FAMILY HISTORY:       PHYSICAL EXAM:  T(C): 37.1 (04-18-25 @ 10:30), Max: 37.1 (04-18-25 @ 10:30)  HR: 78 (04-18-25 @ 14:35) (78 - 96)  BP: 138/56 (04-18-25 @ 10:30) (119/44 - 143/63)  RR: 18 (04-18-25 @ 10:30) (17 - 18)  SpO2: 97% (04-18-25 @ 14:35) (94% - 99%)  Wt(kg): --  I&O's Summary    17 Apr 2025 07:01  -  18 Apr 2025 07:00  --------------------------------------------------------  IN: 1080 mL / OUT: 1500 mL / NET: -420 mL    18 Apr 2025 07:01  -  18 Apr 2025 16:28  --------------------------------------------------------  IN: 440 mL / OUT: 500 mL / NET: -60 mL              EYES:   PERRLA   ENMT:   Moist mucous membranes, Good dentition, No lesions  Cardiovascular: Normal S1 S2, No JVD, No murmurs, No edema  Respiratory: b/l rhonchi   Gastrointestinal:  Soft, Non-tender, + BS	  Extremities: no edema                                    7.8    13.85 )-----------( 428      ( 18 Apr 2025 05:45 )             26.7     04-18    139  |  102  |  15  ----------------------------<  100[H]  3.8   |  27  |  0.89    Ca    8.6      18 Apr 2025 05:45  Phos  3.9     04-18  Mg     2.10     04-18      proBNP:   Lipid Profile:   HgA1c:   TSH:     Consultant(s) Notes Reviewed:  [x ] YES  [ ] NO    Care Discussed with Consultants/Other Providers [ x] YES  [ ] NO    Imaging Personally Reviewed independently:  [x] YES  [ ] NO    All labs, radiologic studies, vitals, orders and medications list reviewed. Patient is seen and examined at bedside. Case discussed with medical team.

## 2025-04-18 NOTE — PROGRESS NOTE ADULT - PROBLEM SELECTOR PLAN 6
pt  noted on intraoperative telemetry with abnormal ST segments,   pt reports  CP with coughing , unlikely cardiac as pain reproducible    cardiology eval appreciated   TTE noted as above, . Left ventricular cavity is normal in size. Left ventricular systolic function is normal with an ejection fraction of 67 % ,  There is mild (grade 1) left ventricular diastolic dysfunction.  Normal right ventricular cavity size and normal right ventricular systolic function

## 2025-04-18 NOTE — CHART NOTE - NSCHARTNOTESELECT_GEN_ALL_CORE
Interventional Pulmonology
Nutrition Follow-up Note/Nutrition Services
Event Note
Event Note
Fever/Event Note
Nutrition Follow-up Note/Nutrition Services

## 2025-04-18 NOTE — PROGRESS NOTE ADULT - PROBLEM SELECTOR PLAN 7
febrile on admission to [facility]   NOW RESOLVED  - infectious workup unrevealing - BCx/UA/CXR neg, QuantiFERON-TB Gold, fungal and viral serology pending  - CT chest with LLL lung nodule - management as below, CT CAP without infectious etiology   - ID (Dr. Bui) evaluated at [facility] - rec flow cytometry, AFB, bacterial and fungal cx to be sent with biopsy, , , BAL negative for infection at this time , will f/u cytopathology   -on  Zosyn (planned for empiric 7 day course per ID at [facility]) -   -pt had worsening leucocytosis  ,now improving ,  per daughter  her baseline is 14-15  - pt with  EBV IGM+ but no PCR detected ,  -ID f/u appreciated, leucocytosis likely reactive vs 2/2 malignancy  - ID rec to monitor off abx , Dced zosyn as pt completed 7 days of empiric treatment

## 2025-04-18 NOTE — PROGRESS NOTE ADULT - ASSESSMENT
EKG: SR ST abn +delta wave  Echo < from: TTE W or WO Ultrasound Enhancing Agent (04.09.25 @ 15:12) >   1. Left ventricular cavity is normal in size. Left ventricular systolic function is normal with an ejection fraction of 67 % by Yan's method of disks.   2. There is mild (grade 1) left ventricular diastolic dysfunction.   3. Normal right ventricular cavity size and normal right ventricular systolic function.   4. Left atrium is normal in size.   5. The right atrium is dilated.   6. A a transcatheter deployed (TAVR) is present in theaortic position. The peak transaortic velocity is 2.07 m/s, peak transaortic gradient is 17.1 mmHg and mean transaortic gradient is 10.0 mmHg with an LVOT/aortic valve VTI ratio of 0.66. There is no regurgitation.   7. No pericardial effusion seen.  8. Estimated pulmonary artery systolic pressure is 48 mmHg.   9. The inferior vena cava is normal in size measuring 2.02 cm in diameter, (normal <2.1cm) with abnormal inspiratory collapse (abnormal <50%) consistent with mildly elevated right atrial pressure (~8, range 5-10mmHg).    < end of copied text >      A/P:  84F with hx of TAVR (2019), COPD (on PRN O2), HLD, S1 Fracture (02/2025 no surgical intervention) initially admitted to [facility] 3/31-4/4 for fever, found to have new LLL mass like consolidation, c/f malignancy, infectious workup negative, transferred to [facility] for bronchoscopy with interventional pulmonology. S/p bronchoscopy 4/7. Cardiology consult called for intra-op ST changes on tele.    1. ST changes   - has c/o chest pain with cough  - EKG ok  - echo normal     2. lung mass  - s/p bronchoscopy 4/7/25 with biopsy of the LLL, EBUS with lymph node biopsy  - shows squamous cell cancer     3. hx AS s/p TAVR   - has been asymptomatic    4. HTN  - c/w losartan

## 2025-04-18 NOTE — PROGRESS NOTE ADULT - SUBJECTIVE AND OBJECTIVE BOX
[facility] Division of Hospital Medicine  Alejandro Finch MD   Available on TEAMS    Patient is a 84y old  Female who presents with a chief complaint of transfer from [facility] (09 Apr 2025 09:34)      SUBJECTIVE / OVERNIGHT EVENTS:  patient seen and examined by bedside, NAD, denies worsening SOB. No CP. No new fevers.   On RA.     MEDICATIONS  (STANDING):  albuterol/ipratropium for Nebulization 3 milliLiter(s) Nebulizer every 6 hours  benzocaine/menthol Lozenge 1 Lozenge Oral two times a day  cefepime   IVPB      cefepime   IVPB 2000 milliGRAM(s) IV Intermittent every 12 hours  enoxaparin Injectable 40 milliGRAM(s) SubCutaneous every 12 hours  fluticasone propionate/ salmeterol 250-50 MICROgram(s) Diskus 1 Dose(s) Inhalation two times a day  gabapentin 100 milliGRAM(s) Oral two times a day  hydrocodone/homatropine Syrup 5 milliLiter(s) Oral two times a day  lidocaine   4% Patch 1 Patch Transdermal daily  losartan 50 milliGRAM(s) Oral daily  pantoprazole    Tablet 40 milliGRAM(s) Oral before breakfast  polyethylene glycol 3350 17 Gram(s) Oral daily  rosuvastatin 40 milliGRAM(s) Oral at bedtime  senna 2 Tablet(s) Oral at bedtime    MEDICATIONS  (PRN):  acetaminophen     Tablet .. 650 milliGRAM(s) Oral every 6 hours PRN Temp greater or equal to 38C (100.4F), Mild Pain (1 - 3), Moderate Pain (4 - 6)  bismuth subsalicylate Liquid 15 milliLiter(s) Oral every 8 hours PRN ingestion  melatonin 3 milliGRAM(s) Oral at bedtime PRN Insomnia  promethazine 25 milliGRAM(s) Oral every 6 hours PRN allergies    Vital Signs Last 24 Hrs  T(C): 37.1 (18 Apr 2025 10:30), Max: 37.1 (18 Apr 2025 10:30)  T(F): 98.7 (18 Apr 2025 10:30), Max: 98.7 (18 Apr 2025 10:30)  HR: 78 (18 Apr 2025 14:35) (78 - 96)  BP: 138/56 (18 Apr 2025 10:30) (119/44 - 143/63)  BP(mean): --  RR: 18 (18 Apr 2025 10:30) (17 - 18)  SpO2: 97% (18 Apr 2025 14:35) (94% - 99%)    Parameters below as of 18 Apr 2025 10:30  Patient On (Oxygen Delivery Method): room air    PHYSICAL EXAM:  CONSTITUTIONAL: NAD,  EYES: PERRLA; conjunctiva and sclera clear  NECK: Supple, no palpable masses;  RESPIRATORY: Normal respiratory effort; basilar crackles  bilaterally  CARDIOVASCULAR: Regular rate and rhythm, normal S1 and S2, no murmur/rub/gallop;   ABDOMEN: Nontender to palpation, normoactive bowel sounds, no rebound/guarding;   MUSCULOSKELETAL:    no clubbing or cyanosis of digits; no joint swelling or tenderness to palpation  PSYCH: A+O to person, place, and time; affect appropriate  NEUROLOGY: CN 2-12 are intact and symmetric; no gross sensory deficits;       LABS:  cret                        7.8    13.85 )-----------( 428      ( 18 Apr 2025 05:45 )             26.7     04-18    139  |  102  |  15  ----------------------------<  100[H]  3.8   |  27  |  0.89    Ca    8.6      18 Apr 2025 05:45  Phos  3.9     04-18  Mg     2.10     04-18          LABS:  cret                        7.8    12.15 )-----------( 416      ( 17 Apr 2025 06:33 )             26.1     04-17    141  |  101  |  13  ----------------------------<  108[H]  3.2[L]   |  28  |  0.80    Ca    8.4      17 Apr 2025 06:33  Phos  3.7     04-17  Mg     2.00     04-17        LABS:                        7.6    13.90 )-----------( 422      ( 16 Apr 2025 05:51 )             25.3     04-16    140  |  101  |  13  ----------------------------<  102[H]  3.5   |  26  |  0.87    Ca    8.3[L]      16 Apr 2025 05:51  Phos  4.4     04-16  Mg     2.00     04-16      LABS:                    8.0    16.65 )-----------( 442      ( 15 Apr 2025 05:30 )             26.4     04-15    138  |  98  |  12  ----------------------------<  87  3.8   |  28  |  0.90    Ca    8.4      15 Apr 2025 05:30  Phos  4.3     04-15  Mg     1.80     04-15    TPro  6.6  /  Alb  3.2[L]  /  TBili  0.5  /  DBili  x   /  AST  14  /  ALT  9   /  AlkPhos  62  04-13      LABS:               8.1    16.99 )-----------( 460      ( 14 Apr 2025 06:20 )             27.1     04-14    141  |  99  |  10  ----------------------------<  108[H]  3.7   |  30  |  0.83    Ca    8.5      14 Apr 2025 06:20  Phos  4.5     04-14  Mg     1.90     04-14    TPro  6.6  /  Alb  3.2[L]  /  TBili  0.5  /  DBili  x   /  AST  14  /  ALT  9   /  AlkPhos  62  04-13      LABS:                        8.2    17.27 )-----------( 471      ( 13 Apr 2025 05:55 )             27.7     04-13    144  |  103  |  8   ----------------------------<  93  3.5   |  30  |  0.79    Ca    8.6      13 Apr 2025 05:55  Phos  4.3     04-13  Mg     1.90     04-13      Urinalysis Basic - ( 13 Apr 2025 05:55 )    Color: x / Appearance: x / SG: x / pH: x  Gluc: 93 mg/dL / Ketone: x  / Bili: x / Urobili: x   Blood: x / Protein: x / Nitrite: x   Leuk Esterase: x / RBC: x / WBC x   Sq Epi: x / Non Sq Epi: x / Bacteria: x    Urinalysis Basic - ( 14 Apr 2025 06:20 )    Color: x / Appearance: x / SG: x / pH: x  Gluc: 108 mg/dL / Ketone: x  / Bili: x / Urobili: x   Blood: x / Protein: x / Nitrite: x   Leuk Esterase: x / RBC: x / WBC x   Sq Epi: x / Non Sq Epi: x / Bacteria: x    Urine Microscopic-Add On (NC) (04.14.25 @ 02:52)   White Blood Cell - Urine: 1 /HPF  Red Blood Cell - Urine: 4 /HPF  Bacteria: Moderate /HPF  Cast: 0 /LPF  Epithelial Cells: 2 /HPF  Yeast-like Cells: Present  Calcium Oxalate Crystals: Present  Uric Acid Crystals: Present  Review: ReviewedCulture - Acid Fast - Tissue w/Smear (04.07.25 @ 18:22)   Specimen Source: Tissue LEFT LOWER LOBE FORCEP BIO  Acid Fast Bacilli Smear:   No acid-fast bacilli seen by fluorochrome stain  Culture Results:   Culture is being performed.Culture - Blood (04.07.25 @ 18:10)   Specimen Source: Blood Blood-Peripheral  Culture Results:   No growth at 5 days      Culture - Blood (collected 13 Apr 2025 19:40)  Source: Blood Blood-Peripheral  Preliminary Report (14 Apr 2025 22:02):    No growth at 24 hours    Culture - Blood (collected 13 Apr 2025 19:10)  Source: Blood Blood-Peripheral  Preliminary Report (14 Apr 2025 22:02):    No growth at 24 hours        RADIOLOGY & ADDITIONAL TESTS:    < from: NM Bone Imaging Total (04.12.25 @ 14:40) >  COMPARISON: None  OTHER STUDIES USED FOR CORRELATION: CT 4/1/2025  FINDINGS:  No random pattern of multifocal uptake that would suggest bone metastases.  Benign variant skull uptake.  Uptake around major joints is degenerative.  Focus of uptake in the upper thoracic spine is likely degenerative.  Focus of uptake in the lumbar spine correlates with degenerative changes   on CT.  Both kidneys are visualized.    IMPRESSION: No evidence of osseous metastasis.  < end of copied text >       TTE W or WO Ultrasound Enhancing Agent (04.09.25 @ 15:12) >  CONCLUSIONS:   1. Left ventricular cavity is normal in size. Left ventricular systolic function is normal with an ejection fraction of 67 % by Yan's method of disks.   2. There is mild (grade 1) left ventricular diastolic dysfunction.   3. Normal right ventricular cavity size and normal right ventricular systolic function.   4. Left atrium is normal in size.   5. The right atrium is dilated.   6. A a transcatheter deployed (TAVR) is present in theaortic position. The peak transaortic velocity is 2.07 m/s, peak transaortic gradient is 17.1 mmHg and mean transaortic gradient is 10.0 mmHg with an LVOT/aortic valve VTI ratio of 0.66. There is no regurgitation.   7. No pericardial effusion seen.  8. Estimated pulmonary artery systolic pressure is 48 mmHg.   9. The inferior vena cava is normal in size measuring 2.02 cm in diameter, (normal <2.1cm) with abnormal inspiratory collapse (abnormal <50%) consistent with mildly elevated right atrial pressure (~8, range 5-10mmHg).    < end of copied text >    < from: Xray Chest 1 View- PORTABLE-Urgent (Xray Chest 1 View- PORTABLE-Urgent .) (04.13.25 @ 20:37) >  IMPRESSION:  Left mid and lower lung opacities. Prior TAVR.  ED was informed through the [facility]    --- End of Report ---  < end of copied text >      < from: MR Head w/wo IV Cont (04.15.25 @ 17:23) >  IMPRESSION: MOTION LIMITED STUDY. NO DEFINITE EVIDENCE OF INTRACRANIAL   HEMORRHAGE, ACUTE TERRITORIAL INFARCT OR AREA OF ABNORMAL ENHANCEMENT. NO   DEFINITE EVIDENCE OF INTRACRANIAL METASTATIC DISEASE.  --- End of Report ---    < end of copied text >

## 2025-04-18 NOTE — PROGRESS NOTE ADULT - PROBLEM SELECTOR PLAN 1
Pt developed new fever, infectious work-up revealed new L lung opacities. Pt started on Vanc and Cefepime for HAP. - cont. cefepime  - MRSA swab - neg. DC vanc.  - tylenol prn for fever  - monitor resp.status. - currently on RA.  Patient's baseline home O2 requirement is 2L NC prn.

## 2025-04-18 NOTE — PROGRESS NOTE ADULT - PROBLEM SELECTOR PLAN 4
Anemia profile noted from 4/2/25 , low serum iron and TIBC, consistent with AOCD with iron deficiency   Hb 8.9-->7.2-->7.8 -->7.8 , maybe dilutional as pt received IVF  - CTM   - transfuse for HB < 7

## 2025-04-18 NOTE — PROGRESS NOTE ADULT - PROBLEM SELECTOR PLAN 10
DVT ppx: lovenox  DIET: DASH   DISPO:  NIKHIL pending auth  plan of care d/w pt at bedside    4/9 - case d/w daughter on the phone , update provided  4/11 : case d/w son at bedside    4/13: case d/w daughter on the phone , updates provided   4/15 tried calling daughter multiple times, no answer  4/16 spoke with daughter in person, all questions answered, family updated on plan of care and discharge planning and are agreeable with the plan.   - case d/w ACP

## 2025-04-18 NOTE — PROGRESS NOTE ADULT - ASSESSMENT
84F with hx of TAVR (2019), COPD (on PRN O2), HLD, S1 Fracture (02/2025 no surgical intervention) initially admitted to [facility] 3/31-4/4 for fever, found to have new LLL mass like consolidation, transferred to [facility] for bronchoscopy with interventional pulmonology- path consistent with SCC, negative bone scan, MRI brain limited study but no definitive evidence of metastatic disease, hosp. course complicated by HAP, now on cefepime, comfortable on NC 2L. No further inpatient heme/onc work-up is planned during this hospitalization.  Pending insurance authorisation for NIKHIL placement.

## 2025-04-19 LAB
ANION GAP SERPL CALC-SCNC: 10 MMOL/L — SIGNIFICANT CHANGE UP (ref 7–14)
BASOPHILS # BLD AUTO: 0.27 K/UL — HIGH (ref 0–0.2)
BASOPHILS NFR BLD AUTO: 1.9 % — SIGNIFICANT CHANGE UP (ref 0–2)
BUN SERPL-MCNC: 12 MG/DL — SIGNIFICANT CHANGE UP (ref 7–23)
CALCIUM SERPL-MCNC: 8.7 MG/DL — SIGNIFICANT CHANGE UP (ref 8.4–10.5)
CHLORIDE SERPL-SCNC: 103 MMOL/L — SIGNIFICANT CHANGE UP (ref 98–107)
CO2 SERPL-SCNC: 29 MMOL/L — SIGNIFICANT CHANGE UP (ref 22–31)
CREAT SERPL-MCNC: 0.89 MG/DL — SIGNIFICANT CHANGE UP (ref 0.5–1.3)
EGFR: 64 ML/MIN/1.73M2 — SIGNIFICANT CHANGE UP
EGFR: 64 ML/MIN/1.73M2 — SIGNIFICANT CHANGE UP
EOSINOPHIL # BLD AUTO: 0.07 K/UL — SIGNIFICANT CHANGE UP (ref 0–0.5)
EOSINOPHIL NFR BLD AUTO: 0.5 % — SIGNIFICANT CHANGE UP (ref 0–6)
GLUCOSE SERPL-MCNC: 92 MG/DL — SIGNIFICANT CHANGE UP (ref 70–99)
HCT VFR BLD CALC: 27.3 % — LOW (ref 34.5–45)
HGB BLD-MCNC: 8.3 G/DL — LOW (ref 11.5–15.5)
IANC: 8.28 K/UL — HIGH (ref 1.8–7.4)
IMM GRANULOCYTES NFR BLD AUTO: 8.5 % — HIGH (ref 0–0.9)
LYMPHOCYTES # BLD AUTO: 1.36 K/UL — SIGNIFICANT CHANGE UP (ref 1–3.3)
LYMPHOCYTES # BLD AUTO: 9.4 % — LOW (ref 13–44)
MAGNESIUM SERPL-MCNC: 2 MG/DL — SIGNIFICANT CHANGE UP (ref 1.6–2.6)
MCHC RBC-ENTMCNC: 16.9 PG — LOW (ref 27–34)
MCHC RBC-ENTMCNC: 30.4 G/DL — LOW (ref 32–36)
MCV RBC AUTO: 55.5 FL — LOW (ref 80–100)
MONOCYTES # BLD AUTO: 3.3 K/UL — HIGH (ref 0–0.9)
MONOCYTES NFR BLD AUTO: 22.7 % — HIGH (ref 2–14)
NEUTROPHILS # BLD AUTO: 8.28 K/UL — HIGH (ref 1.8–7.4)
NEUTROPHILS NFR BLD AUTO: 57 % — SIGNIFICANT CHANGE UP (ref 43–77)
NRBC # BLD AUTO: 0 K/UL — SIGNIFICANT CHANGE UP (ref 0–0)
NRBC # FLD: 0 K/UL — SIGNIFICANT CHANGE UP (ref 0–0)
NRBC BLD AUTO-RTO: 0 /100 WBCS — SIGNIFICANT CHANGE UP (ref 0–0)
PHOSPHATE SERPL-MCNC: 4 MG/DL — SIGNIFICANT CHANGE UP (ref 2.5–4.5)
PLATELET # BLD AUTO: 420 K/UL — HIGH (ref 150–400)
POTASSIUM SERPL-MCNC: 3.7 MMOL/L — SIGNIFICANT CHANGE UP (ref 3.5–5.3)
POTASSIUM SERPL-SCNC: 3.7 MMOL/L — SIGNIFICANT CHANGE UP (ref 3.5–5.3)
RBC # BLD: 4.92 M/UL — SIGNIFICANT CHANGE UP (ref 3.8–5.2)
RBC # FLD: 20.9 % — HIGH (ref 10.3–14.5)
SODIUM SERPL-SCNC: 142 MMOL/L — SIGNIFICANT CHANGE UP (ref 135–145)
WBC # BLD: 14.51 K/UL — HIGH (ref 3.8–10.5)
WBC # FLD AUTO: 14.51 K/UL — HIGH (ref 3.8–10.5)

## 2025-04-19 PROCEDURE — 99233 SBSQ HOSP IP/OBS HIGH 50: CPT

## 2025-04-19 RX ADMIN — Medication 1 DOSE(S): at 21:45

## 2025-04-19 RX ADMIN — ENOXAPARIN SODIUM 40 MILLIGRAM(S): 100 INJECTION SUBCUTANEOUS at 06:54

## 2025-04-19 RX ADMIN — Medication 1 LOZENGE: at 06:55

## 2025-04-19 RX ADMIN — ROSUVASTATIN CALCIUM 40 MILLIGRAM(S): 20 TABLET, FILM COATED ORAL at 21:45

## 2025-04-19 RX ADMIN — IPRATROPIUM BROMIDE AND ALBUTEROL SULFATE 3 MILLILITER(S): .5; 2.5 SOLUTION RESPIRATORY (INHALATION) at 03:47

## 2025-04-19 RX ADMIN — CEFEPIME 100 MILLIGRAM(S): 2 INJECTION, POWDER, FOR SOLUTION INTRAVENOUS at 21:45

## 2025-04-19 RX ADMIN — CEFEPIME 100 MILLIGRAM(S): 2 INJECTION, POWDER, FOR SOLUTION INTRAVENOUS at 09:57

## 2025-04-19 RX ADMIN — Medication 1 LOZENGE: at 17:36

## 2025-04-19 RX ADMIN — GABAPENTIN 100 MILLIGRAM(S): 400 CAPSULE ORAL at 17:37

## 2025-04-19 RX ADMIN — IPRATROPIUM BROMIDE AND ALBUTEROL SULFATE 3 MILLILITER(S): .5; 2.5 SOLUTION RESPIRATORY (INHALATION) at 21:18

## 2025-04-19 RX ADMIN — IPRATROPIUM BROMIDE AND ALBUTEROL SULFATE 3 MILLILITER(S): .5; 2.5 SOLUTION RESPIRATORY (INHALATION) at 15:25

## 2025-04-19 RX ADMIN — GABAPENTIN 100 MILLIGRAM(S): 400 CAPSULE ORAL at 06:55

## 2025-04-19 RX ADMIN — ENOXAPARIN SODIUM 40 MILLIGRAM(S): 100 INJECTION SUBCUTANEOUS at 17:36

## 2025-04-19 RX ADMIN — Medication 40 MILLIGRAM(S): at 06:54

## 2025-04-19 RX ADMIN — LOSARTAN POTASSIUM 50 MILLIGRAM(S): 100 TABLET, FILM COATED ORAL at 06:55

## 2025-04-19 NOTE — PROGRESS NOTE ADULT - PROBLEM SELECTOR PLAN 7
febrile on admission to [facility]   NOW RESOLVED  - infectious workup unrevealing - BCx/UA/CXR neg, QuantiFERON-TB Gold, fungal and viral serology pending  - CT chest with LLL lung nodule - management as below, CT CAP without infectious etiology   - ID (Dr. Bui) evaluated at [facility] - rec flow cytometry, AFB, bacterial and fungal cx to be sent with biopsy, , , BAL negative for infection at this time , will f/u cytopathology   -on  Zosyn (planned for empiric 7 day course per ID at [facility]) -   -pt had worsening leucocytosis  ,now improving ,  per daughter  her baseline is 14-15  - pt with  EBV IGM+ but no PCR detected ,  -ID f/u appreciated, leucocytosis likely reactive vs 2/2 malignancy  - ID rec to monitor off abx , Dced zosyn as pt completed 7 days of empiric treatment

## 2025-04-19 NOTE — PROGRESS NOTE ADULT - SUBJECTIVE AND OBJECTIVE BOX
[facility] Division of Hospital Medicine  Alejandro Finch MD   Available on TEAMS    Patient is a 84y old  Female who presents with a chief complaint of transfer from [facility] (09 Apr 2025 09:34)      SUBJECTIVE / OVERNIGHT EVENTS:  patient seen and examined by bedside, NAD, denies worsening SOB. No CP. No new fevers.   On RA.     MEDICATIONS  (STANDING):  albuterol/ipratropium for Nebulization 3 milliLiter(s) Nebulizer every 6 hours  benzocaine/menthol Lozenge 1 Lozenge Oral two times a day  cefepime   IVPB      cefepime   IVPB 2000 milliGRAM(s) IV Intermittent every 12 hours  enoxaparin Injectable 40 milliGRAM(s) SubCutaneous every 12 hours  fluticasone propionate/ salmeterol 250-50 MICROgram(s) Diskus 1 Dose(s) Inhalation two times a day  gabapentin 100 milliGRAM(s) Oral two times a day  hydrocodone/homatropine Syrup 5 milliLiter(s) Oral two times a day  lidocaine   4% Patch 1 Patch Transdermal daily  losartan 50 milliGRAM(s) Oral daily  pantoprazole    Tablet 40 milliGRAM(s) Oral before breakfast  polyethylene glycol 3350 17 Gram(s) Oral daily  rosuvastatin 40 milliGRAM(s) Oral at bedtime  senna 2 Tablet(s) Oral at bedtime    MEDICATIONS  (PRN):  acetaminophen     Tablet .. 650 milliGRAM(s) Oral every 6 hours PRN Temp greater or equal to 38C (100.4F), Mild Pain (1 - 3), Moderate Pain (4 - 6)  bismuth subsalicylate Liquid 15 milliLiter(s) Oral every 8 hours PRN ingestion  melatonin 3 milliGRAM(s) Oral at bedtime PRN Insomnia  promethazine 25 milliGRAM(s) Oral every 6 hours PRN allergies    Vital Signs Last 24 Hrs  T(C): 36.7 (19 Apr 2025 10:10), Max: 37.1 (18 Apr 2025 10:30)  T(F): 98 (19 Apr 2025 10:10), Max: 98.7 (18 Apr 2025 10:30)  HR: 90 (19 Apr 2025 10:10) (75 - 100)  BP: 140/63 (19 Apr 2025 10:10) (122/48 - 150/59)  BP(mean): --  RR: 17 (19 Apr 2025 10:10) (17 - 18)  SpO2: 95% (19 Apr 2025 10:10) (89% - 97%)    Parameters below as of 19 Apr 2025 10:10  Patient On (Oxygen Delivery Method): room air      PHYSICAL EXAM:  CONSTITUTIONAL: NAD,  EYES: PERRLA; conjunctiva and sclera clear  NECK: Supple, no palpable masses;  RESPIRATORY: Normal respiratory effort; basilar crackles  bilaterally  CARDIOVASCULAR: Regular rate and rhythm, normal S1 and S2, no murmur/rub/gallop;   ABDOMEN: Nontender to palpation, normoactive bowel sounds, no rebound/guarding;   MUSCULOSKELETAL:    no clubbing or cyanosis of digits; no joint swelling or tenderness to palpation  PSYCH: A+O to person, place, and time; affect appropriate  NEUROLOGY: CN 2-12 are intact and symmetric; no gross sensory deficits;       LABS:                        8.3    14.51 )-----------( 420      ( 19 Apr 2025 07:02 )             27.3     04-19    142  |  103  |  12  ----------------------------<  92  3.7   |  29  |  0.89    Ca    8.7      19 Apr 2025 07:02  Phos  4.0     04-19  Mg     2.00     04-19        LABS:  cret                        7.8    13.85 )-----------( 428      ( 18 Apr 2025 05:45 )             26.7     04-18    139  |  102  |  15  ----------------------------<  100[H]  3.8   |  27  |  0.89    Ca    8.6      18 Apr 2025 05:45  Phos  3.9     04-18  Mg     2.10     04-18          LABS:  cret                        7.8    12.15 )-----------( 416      ( 17 Apr 2025 06:33 )             26.1     04-17    141  |  101  |  13  ----------------------------<  108[H]  3.2[L]   |  28  |  0.80    Ca    8.4      17 Apr 2025 06:33  Phos  3.7     04-17  Mg     2.00     04-17        LABS:                        7.6    13.90 )-----------( 422      ( 16 Apr 2025 05:51 )             25.3     04-16    140  |  101  |  13  ----------------------------<  102[H]  3.5   |  26  |  0.87    Ca    8.3[L]      16 Apr 2025 05:51  Phos  4.4     04-16  Mg     2.00     04-16      LABS:                    8.0    16.65 )-----------( 442      ( 15 Apr 2025 05:30 )             26.4     04-15    138  |  98  |  12  ----------------------------<  87  3.8   |  28  |  0.90    Ca    8.4      15 Apr 2025 05:30  Phos  4.3     04-15  Mg     1.80     04-15    TPro  6.6  /  Alb  3.2[L]  /  TBili  0.5  /  DBili  x   /  AST  14  /  ALT  9   /  AlkPhos  62  04-13      LABS:               8.1    16.99 )-----------( 460      ( 14 Apr 2025 06:20 )             27.1     04-14    141  |  99  |  10  ----------------------------<  108[H]  3.7   |  30  |  0.83    Ca    8.5      14 Apr 2025 06:20  Phos  4.5     04-14  Mg     1.90     04-14    TPro  6.6  /  Alb  3.2[L]  /  TBili  0.5  /  DBili  x   /  AST  14  /  ALT  9   /  AlkPhos  62  04-13      LABS:                        8.2    17.27 )-----------( 471      ( 13 Apr 2025 05:55 )             27.7     04-13    144  |  103  |  8   ----------------------------<  93  3.5   |  30  |  0.79    Ca    8.6      13 Apr 2025 05:55  Phos  4.3     04-13  Mg     1.90     04-13      Urinalysis Basic - ( 13 Apr 2025 05:55 )    Color: x / Appearance: x / SG: x / pH: x  Gluc: 93 mg/dL / Ketone: x  / Bili: x / Urobili: x   Blood: x / Protein: x / Nitrite: x   Leuk Esterase: x / RBC: x / WBC x   Sq Epi: x / Non Sq Epi: x / Bacteria: x    Urinalysis Basic - ( 14 Apr 2025 06:20 )    Color: x / Appearance: x / SG: x / pH: x  Gluc: 108 mg/dL / Ketone: x  / Bili: x / Urobili: x   Blood: x / Protein: x / Nitrite: x   Leuk Esterase: x / RBC: x / WBC x   Sq Epi: x / Non Sq Epi: x / Bacteria: x    Urine Microscopic-Add On (NC) (04.14.25 @ 02:52)   White Blood Cell - Urine: 1 /HPF  Red Blood Cell - Urine: 4 /HPF  Bacteria: Moderate /HPF  Cast: 0 /LPF  Epithelial Cells: 2 /HPF  Yeast-like Cells: Present  Calcium Oxalate Crystals: Present  Uric Acid Crystals: Present  Review: ReviewedCulture - Acid Fast - Tissue w/Smear (04.07.25 @ 18:22)   Specimen Source: Tissue LEFT LOWER LOBE FORCEP BIO  Acid Fast Bacilli Smear:   No acid-fast bacilli seen by fluorochrome stain  Culture Results:   Culture is being performed.Culture - Blood (04.07.25 @ 18:10)   Specimen Source: Blood Blood-Peripheral  Culture Results:   No growth at 5 days      Culture - Blood (collected 13 Apr 2025 19:40)  Source: Blood Blood-Peripheral  Preliminary Report (14 Apr 2025 22:02):    No growth at 24 hours    Culture - Blood (collected 13 Apr 2025 19:10)  Source: Blood Blood-Peripheral  Preliminary Report (14 Apr 2025 22:02):    No growth at 24 hours        RADIOLOGY & ADDITIONAL TESTS:    < from: NM Bone Imaging Total (04.12.25 @ 14:40) >  COMPARISON: None  OTHER STUDIES USED FOR CORRELATION: CT 4/1/2025  FINDINGS:  No random pattern of multifocal uptake that would suggest bone metastases.  Benign variant skull uptake.  Uptake around major joints is degenerative.  Focus of uptake in the upper thoracic spine is likely degenerative.  Focus of uptake in the lumbar spine correlates with degenerative changes   on CT.  Both kidneys are visualized.    IMPRESSION: No evidence of osseous metastasis.  < end of copied text >       TTE W or WO Ultrasound Enhancing Agent (04.09.25 @ 15:12) >  CONCLUSIONS:   1. Left ventricular cavity is normal in size. Left ventricular systolic function is normal with an ejection fraction of 67 % by Yan's method of disks.   2. There is mild (grade 1) left ventricular diastolic dysfunction.   3. Normal right ventricular cavity size and normal right ventricular systolic function.   4. Left atrium is normal in size.   5. The right atrium is dilated.   6. A a transcatheter deployed (TAVR) is present in theaortic position. The peak transaortic velocity is 2.07 m/s, peak transaortic gradient is 17.1 mmHg and mean transaortic gradient is 10.0 mmHg with an LVOT/aortic valve VTI ratio of 0.66. There is no regurgitation.   7. No pericardial effusion seen.  8. Estimated pulmonary artery systolic pressure is 48 mmHg.   9. The inferior vena cava is normal in size measuring 2.02 cm in diameter, (normal <2.1cm) with abnormal inspiratory collapse (abnormal <50%) consistent with mildly elevated right atrial pressure (~8, range 5-10mmHg).    < end of copied text >    < from: Xray Chest 1 View- PORTABLE-Urgent (Xray Chest 1 View- PORTABLE-Urgent .) (04.13.25 @ 20:37) >  IMPRESSION:  Left mid and lower lung opacities. Prior TAVR.  ED was informed through the [facility]    --- End of Report ---  < end of copied text >      < from: MR Head w/wo IV Cont (04.15.25 @ 17:23) >  IMPRESSION: MOTION LIMITED STUDY. NO DEFINITE EVIDENCE OF INTRACRANIAL   HEMORRHAGE, ACUTE TERRITORIAL INFARCT OR AREA OF ABNORMAL ENHANCEMENT. NO   DEFINITE EVIDENCE OF INTRACRANIAL METASTATIC DISEASE.  --- End of Report ---    < end of copied text >     [facility] Division of Hospital Medicine  Alejandro Finch MD   Available on TEAMS    Patient is a 84y old  Female who presents with a chief complaint of transfer from [facility] (09 Apr 2025 09:34)      SUBJECTIVE / OVERNIGHT EVENTS:  patient seen and examined by bedside, NAD, denies worsening SOB. No CP. No new fevers.   On RA.   Son at bedside and was updated on plan of care.     MEDICATIONS  (STANDING):  albuterol/ipratropium for Nebulization 3 milliLiter(s) Nebulizer every 6 hours  benzocaine/menthol Lozenge 1 Lozenge Oral two times a day  cefepime   IVPB      cefepime   IVPB 2000 milliGRAM(s) IV Intermittent every 12 hours  enoxaparin Injectable 40 milliGRAM(s) SubCutaneous every 12 hours  fluticasone propionate/ salmeterol 250-50 MICROgram(s) Diskus 1 Dose(s) Inhalation two times a day  gabapentin 100 milliGRAM(s) Oral two times a day  hydrocodone/homatropine Syrup 5 milliLiter(s) Oral two times a day  lidocaine   4% Patch 1 Patch Transdermal daily  losartan 50 milliGRAM(s) Oral daily  pantoprazole    Tablet 40 milliGRAM(s) Oral before breakfast  polyethylene glycol 3350 17 Gram(s) Oral daily  rosuvastatin 40 milliGRAM(s) Oral at bedtime  senna 2 Tablet(s) Oral at bedtime    MEDICATIONS  (PRN):  acetaminophen     Tablet .. 650 milliGRAM(s) Oral every 6 hours PRN Temp greater or equal to 38C (100.4F), Mild Pain (1 - 3), Moderate Pain (4 - 6)  bismuth subsalicylate Liquid 15 milliLiter(s) Oral every 8 hours PRN ingestion  melatonin 3 milliGRAM(s) Oral at bedtime PRN Insomnia  promethazine 25 milliGRAM(s) Oral every 6 hours PRN allergies    Vital Signs Last 24 Hrs  T(C): 36.7 (19 Apr 2025 10:10), Max: 37.1 (18 Apr 2025 10:30)  T(F): 98 (19 Apr 2025 10:10), Max: 98.7 (18 Apr 2025 10:30)  HR: 90 (19 Apr 2025 10:10) (75 - 100)  BP: 140/63 (19 Apr 2025 10:10) (122/48 - 150/59)  BP(mean): --  RR: 17 (19 Apr 2025 10:10) (17 - 18)  SpO2: 95% (19 Apr 2025 10:10) (89% - 97%)    Parameters below as of 19 Apr 2025 10:10  Patient On (Oxygen Delivery Method): room air      PHYSICAL EXAM:  CONSTITUTIONAL: NAD,  EYES: PERRLA; conjunctiva and sclera clear  NECK: Supple, no palpable masses;  RESPIRATORY: Normal respiratory effort; basilar crackles  bilaterally  CARDIOVASCULAR: Regular rate and rhythm, normal S1 and S2, no murmur/rub/gallop;   ABDOMEN: Nontender to palpation, normoactive bowel sounds, no rebound/guarding;   MUSCULOSKELETAL:    no clubbing or cyanosis of digits; no joint swelling or tenderness to palpation  PSYCH: A+O to person, place, and time; affect appropriate  NEUROLOGY: CN 2-12 are intact and symmetric; no gross sensory deficits;       LABS:                        8.3    14.51 )-----------( 420      ( 19 Apr 2025 07:02 )             27.3     04-19    142  |  103  |  12  ----------------------------<  92  3.7   |  29  |  0.89    Ca    8.7      19 Apr 2025 07:02  Phos  4.0     04-19  Mg     2.00     04-19        LABS:  cret                        7.8    13.85 )-----------( 428      ( 18 Apr 2025 05:45 )             26.7     04-18    139  |  102  |  15  ----------------------------<  100[H]  3.8   |  27  |  0.89    Ca    8.6      18 Apr 2025 05:45  Phos  3.9     04-18  Mg     2.10     04-18          LABS:  cret                        7.8    12.15 )-----------( 416      ( 17 Apr 2025 06:33 )             26.1     04-17    141  |  101  |  13  ----------------------------<  108[H]  3.2[L]   |  28  |  0.80    Ca    8.4      17 Apr 2025 06:33  Phos  3.7     04-17  Mg     2.00     04-17        LABS:                        7.6    13.90 )-----------( 422      ( 16 Apr 2025 05:51 )             25.3     04-16    140  |  101  |  13  ----------------------------<  102[H]  3.5   |  26  |  0.87    Ca    8.3[L]      16 Apr 2025 05:51  Phos  4.4     04-16  Mg     2.00     04-16      LABS:                    8.0    16.65 )-----------( 442      ( 15 Apr 2025 05:30 )             26.4     04-15    138  |  98  |  12  ----------------------------<  87  3.8   |  28  |  0.90    Ca    8.4      15 Apr 2025 05:30  Phos  4.3     04-15  Mg     1.80     04-15    TPro  6.6  /  Alb  3.2[L]  /  TBili  0.5  /  DBili  x   /  AST  14  /  ALT  9   /  AlkPhos  62  04-13      LABS:               8.1    16.99 )-----------( 460      ( 14 Apr 2025 06:20 )             27.1     04-14    141  |  99  |  10  ----------------------------<  108[H]  3.7   |  30  |  0.83    Ca    8.5      14 Apr 2025 06:20  Phos  4.5     04-14  Mg     1.90     04-14    TPro  6.6  /  Alb  3.2[L]  /  TBili  0.5  /  DBili  x   /  AST  14  /  ALT  9   /  AlkPhos  62  04-13      LABS:                        8.2    17.27 )-----------( 471      ( 13 Apr 2025 05:55 )             27.7     04-13    144  |  103  |  8   ----------------------------<  93  3.5   |  30  |  0.79    Ca    8.6      13 Apr 2025 05:55  Phos  4.3     04-13  Mg     1.90     04-13      Urinalysis Basic - ( 13 Apr 2025 05:55 )    Color: x / Appearance: x / SG: x / pH: x  Gluc: 93 mg/dL / Ketone: x  / Bili: x / Urobili: x   Blood: x / Protein: x / Nitrite: x   Leuk Esterase: x / RBC: x / WBC x   Sq Epi: x / Non Sq Epi: x / Bacteria: x    Urinalysis Basic - ( 14 Apr 2025 06:20 )    Color: x / Appearance: x / SG: x / pH: x  Gluc: 108 mg/dL / Ketone: x  / Bili: x / Urobili: x   Blood: x / Protein: x / Nitrite: x   Leuk Esterase: x / RBC: x / WBC x   Sq Epi: x / Non Sq Epi: x / Bacteria: x    Urine Microscopic-Add On (NC) (04.14.25 @ 02:52)   White Blood Cell - Urine: 1 /HPF  Red Blood Cell - Urine: 4 /HPF  Bacteria: Moderate /HPF  Cast: 0 /LPF  Epithelial Cells: 2 /HPF  Yeast-like Cells: Present  Calcium Oxalate Crystals: Present  Uric Acid Crystals: Present  Review: ReviewedCulture - Acid Fast - Tissue w/Smear (04.07.25 @ 18:22)   Specimen Source: Tissue LEFT LOWER LOBE FORCEP BIO  Acid Fast Bacilli Smear:   No acid-fast bacilli seen by fluorochrome stain  Culture Results:   Culture is being performed.Culture - Blood (04.07.25 @ 18:10)   Specimen Source: Blood Blood-Peripheral  Culture Results:   No growth at 5 days      Culture - Blood (collected 13 Apr 2025 19:40)  Source: Blood Blood-Peripheral  Preliminary Report (14 Apr 2025 22:02):    No growth at 24 hours    Culture - Blood (collected 13 Apr 2025 19:10)  Source: Blood Blood-Peripheral  Preliminary Report (14 Apr 2025 22:02):    No growth at 24 hours        RADIOLOGY & ADDITIONAL TESTS:    < from: NM Bone Imaging Total (04.12.25 @ 14:40) >  COMPARISON: None  OTHER STUDIES USED FOR CORRELATION: CT 4/1/2025  FINDINGS:  No random pattern of multifocal uptake that would suggest bone metastases.  Benign variant skull uptake.  Uptake around major joints is degenerative.  Focus of uptake in the upper thoracic spine is likely degenerative.  Focus of uptake in the lumbar spine correlates with degenerative changes   on CT.  Both kidneys are visualized.    IMPRESSION: No evidence of osseous metastasis.  < end of copied text >       TTE W or WO Ultrasound Enhancing Agent (04.09.25 @ 15:12) >  CONCLUSIONS:   1. Left ventricular cavity is normal in size. Left ventricular systolic function is normal with an ejection fraction of 67 % by Yan's method of disks.   2. There is mild (grade 1) left ventricular diastolic dysfunction.   3. Normal right ventricular cavity size and normal right ventricular systolic function.   4. Left atrium is normal in size.   5. The right atrium is dilated.   6. A a transcatheter deployed (TAVR) is present in theaortic position. The peak transaortic velocity is 2.07 m/s, peak transaortic gradient is 17.1 mmHg and mean transaortic gradient is 10.0 mmHg with an LVOT/aortic valve VTI ratio of 0.66. There is no regurgitation.   7. No pericardial effusion seen.  8. Estimated pulmonary artery systolic pressure is 48 mmHg.   9. The inferior vena cava is normal in size measuring 2.02 cm in diameter, (normal <2.1cm) with abnormal inspiratory collapse (abnormal <50%) consistent with mildly elevated right atrial pressure (~8, range 5-10mmHg).    < end of copied text >    < from: Xray Chest 1 View- PORTABLE-Urgent (Xray Chest 1 View- PORTABLE-Urgent .) (04.13.25 @ 20:37) >  IMPRESSION:  Left mid and lower lung opacities. Prior TAVR.  ED was informed through the [facility]    --- End of Report ---  < end of copied text >      < from: MR Head w/wo IV Cont (04.15.25 @ 17:23) >  IMPRESSION: MOTION LIMITED STUDY. NO DEFINITE EVIDENCE OF INTRACRANIAL   HEMORRHAGE, ACUTE TERRITORIAL INFARCT OR AREA OF ABNORMAL ENHANCEMENT. NO   DEFINITE EVIDENCE OF INTRACRANIAL METASTATIC DISEASE.  --- End of Report ---    < end of copied text >

## 2025-04-19 NOTE — PROGRESS NOTE ADULT - ASSESSMENT
no 84F with hx of TAVR (2019), COPD (on PRN O2), HLD, S1 Fracture (02/2025 no surgical intervention) initially admitted to [facility] 3/31-4/4 for fever, found to have new LLL mass like consolidation, transferred to [facility] for bronchoscopy with interventional pulmonology- path consistent with SCC, negative bone scan, MRI brain limited study but no definitive evidence of metastatic disease, hosp. course complicated by HAP, now on cefepime, comfortable on NC 2L. No further inpatient heme/onc work-up is planned during this hospitalization.  Pending insurance authorization for [facility] placement. 84F with hx of TAVR (2019), COPD (on PRN O2), HLD, S1 Fracture (02/2025 no surgical intervention) initially admitted to [facility] 3/31-4/4 for fever, found to have new LLL mass like consolidation, transferred to [facility] for bronchoscopy with interventional pulmonology- path consistent with SCC, negative bone scan, MRI brain limited study but no definitive evidence of metastatic disease, hosp. course complicated by HAP, now on cefepime, comfortable on NC 2L. No further inpatient heme/onc work-up is planned during this hospitalization.  Pt is planned to be discharged to [place] on Monday.

## 2025-04-19 NOTE — PROGRESS NOTE ADULT - PROBLEM SELECTOR PLAN 8
improving (was 65 on admission)  - infectious workup unrevealing - BCx/UA/CXR neg, QuantiFERON-TB Gold, fungal and viral serology pending  - ID evaluated at [facility] - empric zosyn x 7 days, rec flow cytometry, AFB, bacterial and fungal cx to be sent with biopsy  pt had worsening leucocytosis  , per daughter  her baseline is 14-15  - pt with  EBV IGM+ but no PCR detected ,  - ID f/u appreciated, leucocytosis likely reactive vs 2/2 malignancy  - ID rec to monitor off abx , will dc zosyn as pt completed 7 days of empiric treatment  - trend WBC

## 2025-04-20 LAB
ANION GAP SERPL CALC-SCNC: 15 MMOL/L — HIGH (ref 7–14)
BASOPHILS # BLD AUTO: 0.21 K/UL — HIGH (ref 0–0.2)
BASOPHILS NFR BLD AUTO: 1.3 % — SIGNIFICANT CHANGE UP (ref 0–2)
BUN SERPL-MCNC: 13 MG/DL — SIGNIFICANT CHANGE UP (ref 7–23)
CALCIUM SERPL-MCNC: 8.5 MG/DL — SIGNIFICANT CHANGE UP (ref 8.4–10.5)
CHLORIDE SERPL-SCNC: 102 MMOL/L — SIGNIFICANT CHANGE UP (ref 98–107)
CO2 SERPL-SCNC: 25 MMOL/L — SIGNIFICANT CHANGE UP (ref 22–31)
CREAT SERPL-MCNC: 0.92 MG/DL — SIGNIFICANT CHANGE UP (ref 0.5–1.3)
EGFR: 61 ML/MIN/1.73M2 — SIGNIFICANT CHANGE UP
EGFR: 61 ML/MIN/1.73M2 — SIGNIFICANT CHANGE UP
EOSINOPHIL # BLD AUTO: 0.08 K/UL — SIGNIFICANT CHANGE UP (ref 0–0.5)
EOSINOPHIL NFR BLD AUTO: 0.5 % — SIGNIFICANT CHANGE UP (ref 0–6)
GLUCOSE SERPL-MCNC: 99 MG/DL — SIGNIFICANT CHANGE UP (ref 70–99)
HCT VFR BLD CALC: 25.8 % — LOW (ref 34.5–45)
HGB BLD-MCNC: 7.9 G/DL — LOW (ref 11.5–15.5)
IANC: 9.28 K/UL — HIGH (ref 1.8–7.4)
IMM GRANULOCYTES NFR BLD AUTO: 7.7 % — HIGH (ref 0–0.9)
LYMPHOCYTES # BLD AUTO: 1.34 K/UL — SIGNIFICANT CHANGE UP (ref 1–3.3)
LYMPHOCYTES # BLD AUTO: 8.4 % — LOW (ref 13–44)
MAGNESIUM SERPL-MCNC: 1.9 MG/DL — SIGNIFICANT CHANGE UP (ref 1.6–2.6)
MCHC RBC-ENTMCNC: 16.9 PG — LOW (ref 27–34)
MCHC RBC-ENTMCNC: 30.6 G/DL — LOW (ref 32–36)
MCV RBC AUTO: 55.1 FL — LOW (ref 80–100)
MONOCYTES # BLD AUTO: 3.8 K/UL — HIGH (ref 0–0.9)
MONOCYTES NFR BLD AUTO: 23.9 % — HIGH (ref 2–14)
NEUTROPHILS # BLD AUTO: 9.28 K/UL — HIGH (ref 1.8–7.4)
NEUTROPHILS NFR BLD AUTO: 58.2 % — SIGNIFICANT CHANGE UP (ref 43–77)
NRBC # BLD AUTO: 0 K/UL — SIGNIFICANT CHANGE UP (ref 0–0)
NRBC # FLD: 0 K/UL — SIGNIFICANT CHANGE UP (ref 0–0)
NRBC BLD AUTO-RTO: 0 /100 WBCS — SIGNIFICANT CHANGE UP (ref 0–0)
PHOSPHATE SERPL-MCNC: 4.4 MG/DL — SIGNIFICANT CHANGE UP (ref 2.5–4.5)
PLATELET # BLD AUTO: 385 K/UL — SIGNIFICANT CHANGE UP (ref 150–400)
POTASSIUM SERPL-MCNC: 3.4 MMOL/L — LOW (ref 3.5–5.3)
POTASSIUM SERPL-SCNC: 3.4 MMOL/L — LOW (ref 3.5–5.3)
RBC # BLD: 4.68 M/UL — SIGNIFICANT CHANGE UP (ref 3.8–5.2)
RBC # FLD: 20.7 % — HIGH (ref 10.3–14.5)
SODIUM SERPL-SCNC: 142 MMOL/L — SIGNIFICANT CHANGE UP (ref 135–145)
WBC # BLD: 15.93 K/UL — HIGH (ref 3.8–10.5)
WBC # FLD AUTO: 15.93 K/UL — HIGH (ref 3.8–10.5)

## 2025-04-20 PROCEDURE — 99233 SBSQ HOSP IP/OBS HIGH 50: CPT

## 2025-04-20 RX ORDER — MAGNESIUM SULFATE 500 MG/ML
1 SYRINGE (ML) INJECTION ONCE
Refills: 0 | Status: COMPLETED | OUTPATIENT
Start: 2025-04-20 | End: 2025-04-20

## 2025-04-20 RX ADMIN — IPRATROPIUM BROMIDE AND ALBUTEROL SULFATE 3 MILLILITER(S): .5; 2.5 SOLUTION RESPIRATORY (INHALATION) at 09:34

## 2025-04-20 RX ADMIN — CEFEPIME 100 MILLIGRAM(S): 2 INJECTION, POWDER, FOR SOLUTION INTRAVENOUS at 10:17

## 2025-04-20 RX ADMIN — Medication 40 MILLIEQUIVALENT(S): at 10:17

## 2025-04-20 RX ADMIN — IPRATROPIUM BROMIDE AND ALBUTEROL SULFATE 3 MILLILITER(S): .5; 2.5 SOLUTION RESPIRATORY (INHALATION) at 15:09

## 2025-04-20 RX ADMIN — ROSUVASTATIN CALCIUM 40 MILLIGRAM(S): 20 TABLET, FILM COATED ORAL at 22:43

## 2025-04-20 RX ADMIN — GABAPENTIN 100 MILLIGRAM(S): 400 CAPSULE ORAL at 22:43

## 2025-04-20 RX ADMIN — CEFEPIME 100 MILLIGRAM(S): 2 INJECTION, POWDER, FOR SOLUTION INTRAVENOUS at 22:42

## 2025-04-20 RX ADMIN — IPRATROPIUM BROMIDE AND ALBUTEROL SULFATE 3 MILLILITER(S): .5; 2.5 SOLUTION RESPIRATORY (INHALATION) at 20:37

## 2025-04-20 RX ADMIN — GABAPENTIN 100 MILLIGRAM(S): 400 CAPSULE ORAL at 06:16

## 2025-04-20 RX ADMIN — ENOXAPARIN SODIUM 40 MILLIGRAM(S): 100 INJECTION SUBCUTANEOUS at 06:15

## 2025-04-20 RX ADMIN — Medication 1 DOSE(S): at 22:43

## 2025-04-20 RX ADMIN — IPRATROPIUM BROMIDE AND ALBUTEROL SULFATE 3 MILLILITER(S): .5; 2.5 SOLUTION RESPIRATORY (INHALATION) at 03:35

## 2025-04-20 RX ADMIN — LOSARTAN POTASSIUM 50 MILLIGRAM(S): 100 TABLET, FILM COATED ORAL at 06:15

## 2025-04-20 RX ADMIN — Medication 1 LOZENGE: at 06:15

## 2025-04-20 RX ADMIN — Medication 100 GRAM(S): at 16:48

## 2025-04-20 NOTE — PROGRESS NOTE ADULT - PROVIDER SPECIALTY LIST ADULT
Intervent Pulmonology
Cardiology
Cardiology
Hospitalist
Intervent Pulmonology
Cardiology
Cardiology
Heme/Onc
Infectious Disease
Hospitalist

## 2025-04-20 NOTE — PROGRESS NOTE ADULT - SUBJECTIVE AND OBJECTIVE BOX
[facility] Division of Hospital Medicine  Alejandro Finch MD   Available on TEAMS    Patient is a 84y old  Female who presents with a chief complaint of transfer from [facility] (09 Apr 2025 09:34)      SUBJECTIVE / OVERNIGHT EVENTS:  4/19: patient seen and examined by bedside, NAD, denies worsening SOB. No CP. No new fevers.   On RA.   Son at bedside and was updated on plan of care.     MEDICATIONS  (STANDING):  albuterol/ipratropium for Nebulization 3 milliLiter(s) Nebulizer every 6 hours  benzocaine/menthol Lozenge 1 Lozenge Oral two times a day  cefepime   IVPB      cefepime   IVPB 2000 milliGRAM(s) IV Intermittent every 12 hours  enoxaparin Injectable 40 milliGRAM(s) SubCutaneous every 12 hours  fluticasone propionate/ salmeterol 250-50 MICROgram(s) Diskus 1 Dose(s) Inhalation two times a day  gabapentin 100 milliGRAM(s) Oral two times a day  hydrocodone/homatropine Syrup 5 milliLiter(s) Oral two times a day  lidocaine   4% Patch 1 Patch Transdermal daily  losartan 50 milliGRAM(s) Oral daily  pantoprazole    Tablet 40 milliGRAM(s) Oral before breakfast  polyethylene glycol 3350 17 Gram(s) Oral daily  potassium chloride   Powder 40 milliEquivalent(s) Oral once  rosuvastatin 40 milliGRAM(s) Oral at bedtime  senna 2 Tablet(s) Oral at bedtime    MEDICATIONS  (PRN):  acetaminophen     Tablet .. 650 milliGRAM(s) Oral every 6 hours PRN Temp greater or equal to 38C (100.4F), Mild Pain (1 - 3), Moderate Pain (4 - 6)  bismuth subsalicylate Liquid 15 milliLiter(s) Oral every 8 hours PRN ingestion  melatonin 3 milliGRAM(s) Oral at bedtime PRN Insomnia  promethazine 25 milliGRAM(s) Oral every 6 hours PRN allergies    Vital Signs Last 24 Hrs  T(C): 36.6 (20 Apr 2025 06:15), Max: 36.8 (19 Apr 2025 21:50)  T(F): 97.9 (20 Apr 2025 06:15), Max: 98.2 (19 Apr 2025 21:50)  HR: 99 (20 Apr 2025 06:15) (88 - 102)  BP: 147/69 (20 Apr 2025 06:15) (132/57 - 147/69)  BP(mean): --  RR: 18 (20 Apr 2025 06:15) (16 - 18)  SpO2: 96% (20 Apr 2025 06:15) (91% - 98%)    Parameters below as of 20 Apr 2025 06:15  Patient On (Oxygen Delivery Method): nasal cannula  O2 Flow (L/min): 2    PHYSICAL EXAM:  CONSTITUTIONAL: NAD,  EYES: PERRLA; conjunctiva and sclera clear  NECK: Supple, no palpable masses;  RESPIRATORY: Normal respiratory effort; basilar crackles  bilaterally  CARDIOVASCULAR: Regular rate and rhythm, normal S1 and S2, no murmur/rub/gallop;   ABDOMEN: Nontender to palpation, normoactive bowel sounds, no rebound/guarding;   MUSCULOSKELETAL:    no clubbing or cyanosis of digits; no joint swelling or tenderness to palpation  PSYCH: A+O to person, place, and time; affect appropriate  NEUROLOGY: CN 2-12 are intact and symmetric; no gross sensory deficits;       LABS:             7.9    15.93 )-----------( 385      ( 20 Apr 2025 06:33 )             25.8     142  |  102  |  13  ----------------------------<  99  3.4[L]   |  25  |  0.92    Ca    8.5      20 Apr 2025 06:33  Phos  4.4     04-20  Mg     1.90     04-20          LABS:             8.3    14.51 )-----------( 420      ( 19 Apr 2025 07:02 )             27.3     04-19    142  |  103  |  12  ----------------------------<  92  3.7   |  29  |  0.89    Ca    8.7      19 Apr 2025 07:02  Phos  4.0     04-19  Mg     2.00     04-19        LABS:  cret                        7.8    13.85 )-----------( 428      ( 18 Apr 2025 05:45 )             26.7     04-18    139  |  102  |  15  ----------------------------<  100[H]  3.8   |  27  |  0.89    Ca    8.6      18 Apr 2025 05:45  Phos  3.9     04-18  Mg     2.10     04-18          LABS:  cret                        7.8    12.15 )-----------( 416      ( 17 Apr 2025 06:33 )             26.1     04-17    141  |  101  |  13  ----------------------------<  108[H]  3.2[L]   |  28  |  0.80    Ca    8.4      17 Apr 2025 06:33  Phos  3.7     04-17  Mg     2.00     04-17        LABS:                        7.6    13.90 )-----------( 422      ( 16 Apr 2025 05:51 )             25.3     04-16    140  |  101  |  13  ----------------------------<  102[H]  3.5   |  26  |  0.87    Ca    8.3[L]      16 Apr 2025 05:51  Phos  4.4     04-16  Mg     2.00     04-16      LABS:                    8.0    16.65 )-----------( 442      ( 15 Apr 2025 05:30 )             26.4     04-15    138  |  98  |  12  ----------------------------<  87  3.8   |  28  |  0.90    Ca    8.4      15 Apr 2025 05:30  Phos  4.3     04-15  Mg     1.80     04-15    TPro  6.6  /  Alb  3.2[L]  /  TBili  0.5  /  DBili  x   /  AST  14  /  ALT  9   /  AlkPhos  62  04-13      LABS:               8.1    16.99 )-----------( 460      ( 14 Apr 2025 06:20 )             27.1     04-14    141  |  99  |  10  ----------------------------<  108[H]  3.7   |  30  |  0.83    Ca    8.5      14 Apr 2025 06:20  Phos  4.5     04-14  Mg     1.90     04-14    TPro  6.6  /  Alb  3.2[L]  /  TBili  0.5  /  DBili  x   /  AST  14  /  ALT  9   /  AlkPhos  62  04-13      LABS:                        8.2    17.27 )-----------( 471      ( 13 Apr 2025 05:55 )             27.7     04-13    144  |  103  |  8   ----------------------------<  93  3.5   |  30  |  0.79    Ca    8.6      13 Apr 2025 05:55  Phos  4.3     04-13  Mg     1.90     04-13      Urinalysis Basic - ( 13 Apr 2025 05:55 )    Color: x / Appearance: x / SG: x / pH: x  Gluc: 93 mg/dL / Ketone: x  / Bili: x / Urobili: x   Blood: x / Protein: x / Nitrite: x   Leuk Esterase: x / RBC: x / WBC x   Sq Epi: x / Non Sq Epi: x / Bacteria: x    Urinalysis Basic - ( 14 Apr 2025 06:20 )    Color: x / Appearance: x / SG: x / pH: x  Gluc: 108 mg/dL / Ketone: x  / Bili: x / Urobili: x   Blood: x / Protein: x / Nitrite: x   Leuk Esterase: x / RBC: x / WBC x   Sq Epi: x / Non Sq Epi: x / Bacteria: x    Urine Microscopic-Add On (NC) (04.14.25 @ 02:52)   White Blood Cell - Urine: 1 /HPF  Red Blood Cell - Urine: 4 /HPF  Bacteria: Moderate /HPF  Cast: 0 /LPF  Epithelial Cells: 2 /HPF  Yeast-like Cells: Present  Calcium Oxalate Crystals: Present  Uric Acid Crystals: Present  Review: ReviewedCulture - Acid Fast - Tissue w/Smear (04.07.25 @ 18:22)   Specimen Source: Tissue LEFT LOWER LOBE FORCEP BIO  Acid Fast Bacilli Smear:   No acid-fast bacilli seen by fluorochrome stain  Culture Results:   Culture is being performed.Culture - Blood (04.07.25 @ 18:10)   Specimen Source: Blood Blood-Peripheral  Culture Results:   No growth at 5 days      Culture - Blood (collected 13 Apr 2025 19:40)  Source: Blood Blood-Peripheral  Preliminary Report (14 Apr 2025 22:02):    No growth at 24 hours    Culture - Blood (collected 13 Apr 2025 19:10)  Source: Blood Blood-Peripheral  Preliminary Report (14 Apr 2025 22:02):    No growth at 24 hours        RADIOLOGY & ADDITIONAL TESTS:    < from: NM Bone Imaging Total (04.12.25 @ 14:40) >  COMPARISON: None  OTHER STUDIES USED FOR CORRELATION: CT 4/1/2025  FINDINGS:  No random pattern of multifocal uptake that would suggest bone metastases.  Benign variant skull uptake.  Uptake around major joints is degenerative.  Focus of uptake in the upper thoracic spine is likely degenerative.  Focus of uptake in the lumbar spine correlates with degenerative changes   on CT.  Both kidneys are visualized.    IMPRESSION: No evidence of osseous metastasis.  < end of copied text >       TTE W or WO Ultrasound Enhancing Agent (04.09.25 @ 15:12) >  CONCLUSIONS:   1. Left ventricular cavity is normal in size. Left ventricular systolic function is normal with an ejection fraction of 67 % by Yan's method of disks.   2. There is mild (grade 1) left ventricular diastolic dysfunction.   3. Normal right ventricular cavity size and normal right ventricular systolic function.   4. Left atrium is normal in size.   5. The right atrium is dilated.   6. A a transcatheter deployed (TAVR) is present in theaortic position. The peak transaortic velocity is 2.07 m/s, peak transaortic gradient is 17.1 mmHg and mean transaortic gradient is 10.0 mmHg with an LVOT/aortic valve VTI ratio of 0.66. There is no regurgitation.   7. No pericardial effusion seen.  8. Estimated pulmonary artery systolic pressure is 48 mmHg.   9. The inferior vena cava is normal in size measuring 2.02 cm in diameter, (normal <2.1cm) with abnormal inspiratory collapse (abnormal <50%) consistent with mildly elevated right atrial pressure (~8, range 5-10mmHg).    < end of copied text >    < from: Xray Chest 1 View- PORTABLE-Urgent (Xray Chest 1 View- PORTABLE-Urgent .) (04.13.25 @ 20:37) >  IMPRESSION:  Left mid and lower lung opacities. Prior TAVR.  ED was informed through the [facility]    --- End of Report ---  < end of copied text >      < from: MR Head w/wo IV Cont (04.15.25 @ 17:23) >  IMPRESSION: MOTION LIMITED STUDY. NO DEFINITE EVIDENCE OF INTRACRANIAL   HEMORRHAGE, ACUTE TERRITORIAL INFARCT OR AREA OF ABNORMAL ENHANCEMENT. NO   DEFINITE EVIDENCE OF INTRACRANIAL METASTATIC DISEASE.  --- End of Report ---    < end of copied text >     [facility] Division of Hospital Medicine  Alejandro Finch MD   Available on TEAMS    Patient is a 84y old  Female who presents with a chief complaint of transfer from [facility] (09 Apr 2025 09:34)      SUBJECTIVE / OVERNIGHT EVENTS:  4/19: patient seen and examined by bedside, NAD, denies worsening SOB. No CP. No new fevers.       MEDICATIONS  (STANDING):  albuterol/ipratropium for Nebulization 3 milliLiter(s) Nebulizer every 6 hours  benzocaine/menthol Lozenge 1 Lozenge Oral two times a day  cefepime   IVPB      cefepime   IVPB 2000 milliGRAM(s) IV Intermittent every 12 hours  enoxaparin Injectable 40 milliGRAM(s) SubCutaneous every 12 hours  fluticasone propionate/ salmeterol 250-50 MICROgram(s) Diskus 1 Dose(s) Inhalation two times a day  gabapentin 100 milliGRAM(s) Oral two times a day  hydrocodone/homatropine Syrup 5 milliLiter(s) Oral two times a day  lidocaine   4% Patch 1 Patch Transdermal daily  losartan 50 milliGRAM(s) Oral daily  pantoprazole    Tablet 40 milliGRAM(s) Oral before breakfast  polyethylene glycol 3350 17 Gram(s) Oral daily  potassium chloride   Powder 40 milliEquivalent(s) Oral once  rosuvastatin 40 milliGRAM(s) Oral at bedtime  senna 2 Tablet(s) Oral at bedtime    MEDICATIONS  (PRN):  acetaminophen     Tablet .. 650 milliGRAM(s) Oral every 6 hours PRN Temp greater or equal to 38C (100.4F), Mild Pain (1 - 3), Moderate Pain (4 - 6)  bismuth subsalicylate Liquid 15 milliLiter(s) Oral every 8 hours PRN ingestion  melatonin 3 milliGRAM(s) Oral at bedtime PRN Insomnia  promethazine 25 milliGRAM(s) Oral every 6 hours PRN allergies    Vital Signs Last 24 Hrs  T(C): 36.6 (20 Apr 2025 06:15), Max: 36.8 (19 Apr 2025 21:50)  T(F): 97.9 (20 Apr 2025 06:15), Max: 98.2 (19 Apr 2025 21:50)  HR: 99 (20 Apr 2025 06:15) (88 - 102)  BP: 147/69 (20 Apr 2025 06:15) (132/57 - 147/69)  BP(mean): --  RR: 18 (20 Apr 2025 06:15) (16 - 18)  SpO2: 96% (20 Apr 2025 06:15) (91% - 98%)    Parameters below as of 20 Apr 2025 06:15  Patient On (Oxygen Delivery Method): nasal cannula  O2 Flow (L/min): 2    PHYSICAL EXAM:  CONSTITUTIONAL: NAD,  EYES: PERRLA; conjunctiva and sclera clear  NECK: Supple, no palpable masses;  RESPIRATORY: Normal respiratory effort; basilar crackles  bilaterally  CARDIOVASCULAR: Regular rate and rhythm, normal S1 and S2, no murmur/rub/gallop;   ABDOMEN: Nontender to palpation, normoactive bowel sounds, no rebound/guarding;   MUSCULOSKELETAL:    no clubbing or cyanosis of digits; no joint swelling or tenderness to palpation  PSYCH: A+O to person, place, and time; affect appropriate  NEUROLOGY: CN 2-12 are intact and symmetric; no gross sensory deficits;       LABS:             7.9    15.93 )-----------( 385      ( 20 Apr 2025 06:33 )             25.8     142  |  102  |  13  ----------------------------<  99  3.4[L]   |  25  |  0.92    Ca    8.5      20 Apr 2025 06:33  Phos  4.4     04-20  Mg     1.90     04-20          LABS:             8.3    14.51 )-----------( 420      ( 19 Apr 2025 07:02 )             27.3     04-19    142  |  103  |  12  ----------------------------<  92  3.7   |  29  |  0.89    Ca    8.7      19 Apr 2025 07:02  Phos  4.0     04-19  Mg     2.00     04-19        LABS:  cret                        7.8    13.85 )-----------( 428      ( 18 Apr 2025 05:45 )             26.7     04-18    139  |  102  |  15  ----------------------------<  100[H]  3.8   |  27  |  0.89    Ca    8.6      18 Apr 2025 05:45  Phos  3.9     04-18  Mg     2.10     04-18          LABS:  cret                        7.8    12.15 )-----------( 416      ( 17 Apr 2025 06:33 )             26.1     04-17    141  |  101  |  13  ----------------------------<  108[H]  3.2[L]   |  28  |  0.80    Ca    8.4      17 Apr 2025 06:33  Phos  3.7     04-17  Mg     2.00     04-17        LABS:                        7.6    13.90 )-----------( 422      ( 16 Apr 2025 05:51 )             25.3     04-16    140  |  101  |  13  ----------------------------<  102[H]  3.5   |  26  |  0.87    Ca    8.3[L]      16 Apr 2025 05:51  Phos  4.4     04-16  Mg     2.00     04-16      LABS:                    8.0    16.65 )-----------( 442      ( 15 Apr 2025 05:30 )             26.4     04-15    138  |  98  |  12  ----------------------------<  87  3.8   |  28  |  0.90    Ca    8.4      15 Apr 2025 05:30  Phos  4.3     04-15  Mg     1.80     04-15    TPro  6.6  /  Alb  3.2[L]  /  TBili  0.5  /  DBili  x   /  AST  14  /  ALT  9   /  AlkPhos  62  04-13      LABS:               8.1    16.99 )-----------( 460      ( 14 Apr 2025 06:20 )             27.1     04-14    141  |  99  |  10  ----------------------------<  108[H]  3.7   |  30  |  0.83    Ca    8.5      14 Apr 2025 06:20  Phos  4.5     04-14  Mg     1.90     04-14    TPro  6.6  /  Alb  3.2[L]  /  TBili  0.5  /  DBili  x   /  AST  14  /  ALT  9   /  AlkPhos  62  04-13      LABS:                        8.2    17.27 )-----------( 471      ( 13 Apr 2025 05:55 )             27.7     04-13    144  |  103  |  8   ----------------------------<  93  3.5   |  30  |  0.79    Ca    8.6      13 Apr 2025 05:55  Phos  4.3     04-13  Mg     1.90     04-13      Urinalysis Basic - ( 13 Apr 2025 05:55 )    Color: x / Appearance: x / SG: x / pH: x  Gluc: 93 mg/dL / Ketone: x  / Bili: x / Urobili: x   Blood: x / Protein: x / Nitrite: x   Leuk Esterase: x / RBC: x / WBC x   Sq Epi: x / Non Sq Epi: x / Bacteria: x    Urinalysis Basic - ( 14 Apr 2025 06:20 )    Color: x / Appearance: x / SG: x / pH: x  Gluc: 108 mg/dL / Ketone: x  / Bili: x / Urobili: x   Blood: x / Protein: x / Nitrite: x   Leuk Esterase: x / RBC: x / WBC x   Sq Epi: x / Non Sq Epi: x / Bacteria: x    Urine Microscopic-Add On (NC) (04.14.25 @ 02:52)   White Blood Cell - Urine: 1 /HPF  Red Blood Cell - Urine: 4 /HPF  Bacteria: Moderate /HPF  Cast: 0 /LPF  Epithelial Cells: 2 /HPF  Yeast-like Cells: Present  Calcium Oxalate Crystals: Present  Uric Acid Crystals: Present  Review: ReviewedCulture - Acid Fast - Tissue w/Smear (04.07.25 @ 18:22)   Specimen Source: Tissue LEFT LOWER LOBE FORCEP BIO  Acid Fast Bacilli Smear:   No acid-fast bacilli seen by fluorochrome stain  Culture Results:   Culture is being performed.Culture - Blood (04.07.25 @ 18:10)   Specimen Source: Blood Blood-Peripheral  Culture Results:   No growth at 5 days      Culture - Blood (collected 13 Apr 2025 19:40)  Source: Blood Blood-Peripheral  Preliminary Report (14 Apr 2025 22:02):    No growth at 24 hours    Culture - Blood (collected 13 Apr 2025 19:10)  Source: Blood Blood-Peripheral  Preliminary Report (14 Apr 2025 22:02):    No growth at 24 hours        RADIOLOGY & ADDITIONAL TESTS:    < from: NM Bone Imaging Total (04.12.25 @ 14:40) >  COMPARISON: None  OTHER STUDIES USED FOR CORRELATION: CT 4/1/2025  FINDINGS:  No random pattern of multifocal uptake that would suggest bone metastases.  Benign variant skull uptake.  Uptake around major joints is degenerative.  Focus of uptake in the upper thoracic spine is likely degenerative.  Focus of uptake in the lumbar spine correlates with degenerative changes   on CT.  Both kidneys are visualized.    IMPRESSION: No evidence of osseous metastasis.  < end of copied text >       TTE W or WO Ultrasound Enhancing Agent (04.09.25 @ 15:12) >  CONCLUSIONS:   1. Left ventricular cavity is normal in size. Left ventricular systolic function is normal with an ejection fraction of 67 % by Yan's method of disks.   2. There is mild (grade 1) left ventricular diastolic dysfunction.   3. Normal right ventricular cavity size and normal right ventricular systolic function.   4. Left atrium is normal in size.   5. The right atrium is dilated.   6. A a transcatheter deployed (TAVR) is present in theaortic position. The peak transaortic velocity is 2.07 m/s, peak transaortic gradient is 17.1 mmHg and mean transaortic gradient is 10.0 mmHg with an LVOT/aortic valve VTI ratio of 0.66. There is no regurgitation.   7. No pericardial effusion seen.  8. Estimated pulmonary artery systolic pressure is 48 mmHg.   9. The inferior vena cava is normal in size measuring 2.02 cm in diameter, (normal <2.1cm) with abnormal inspiratory collapse (abnormal <50%) consistent with mildly elevated right atrial pressure (~8, range 5-10mmHg).    < end of copied text >    < from: Xray Chest 1 View- PORTABLE-Urgent (Xray Chest 1 View- PORTABLE-Urgent .) (04.13.25 @ 20:37) >  IMPRESSION:  Left mid and lower lung opacities. Prior TAVR.  ED was informed through the [facility]    --- End of Report ---  < end of copied text >      < from: MR Head w/wo IV Cont (04.15.25 @ 17:23) >  IMPRESSION: MOTION LIMITED STUDY. NO DEFINITE EVIDENCE OF INTRACRANIAL   HEMORRHAGE, ACUTE TERRITORIAL INFARCT OR AREA OF ABNORMAL ENHANCEMENT. NO   DEFINITE EVIDENCE OF INTRACRANIAL METASTATIC DISEASE.  --- End of Report ---    < end of copied text >

## 2025-04-20 NOTE — PROGRESS NOTE ADULT - PROBLEM SELECTOR PLAN 4
Anemia profile noted from 4/2/25 , low serum iron and TIBC, consistent with AOCD with iron deficiency   Hb 8.9-->7.2-->7.8 -->7.8   - CTM   - transfuse for HB < 7

## 2025-04-20 NOTE — PROGRESS NOTE ADULT - PROBLEM SELECTOR PLAN 1
Pt developed new fever, infectious work-up revealed new L lung opacities. Pt started on Vanc and Cefepime for HAP. - cont. cefepime  - MRSA swab - neg. DC vanc.  - tylenol prn for fever  - monitor resp.status. - currently on RA.  Patient's baseline home O2 requirement is 2L NC prn. Pt developed new fever, infectious work-up revealed new L lung opacities. Pt started on Vanc and Cefepime for HAP. - cont. cefepime. Last dose Monday.   - MRSA swab - neg. DC vanc.  - tylenol prn for fever  - monitor resp.status. - currently on RA.  Patient's baseline home O2 requirement is 2L NC prn.    4/19-4/20: WBCs mildlly uptrending -  baseline ~14- 15 (as stated above). However, pt is clinically improving on Cefepime and does not show any signs of new infectious process. Will monitor for now. Pt developed new fever, infectious work-up revealed new L lung opacities. Pt started on Vanc and Cefepime for HAP. - cont. cefepime. Last dose Monday. (4/14- 4/21)  - MRSA swab - neg. DC vanc.  - tylenol prn for fever  - monitor resp.status. - currently on RA.  Patient's baseline home O2 requirement is 2L NC prn.    4/19-4/20: WBCs mildlly uptrending -  baseline ~14- 15 (as stated above). However, pt is clinically improving on Cefepime and does not show any signs of new infectious process. Will monitor for now.

## 2025-04-20 NOTE — PROGRESS NOTE ADULT - PROBLEM SELECTOR PLAN 10
DVT ppx: [email or web address]  DIET: DASH   DISPO:  NIKHIL pending auth  plan of care d/w pt at bedside    4/9 - case d/w daughter on the phone , update provided  4/11 : case d/w son at bedside    4/13: case d/w daughter on the phone , updates provided   4/15 tried calling daughter multiple times, no answer  4/16 spoke with daughter in person, all questions answered, family updated on plan of care and discharge planning and are agreeable with the plan.   4/19: Son at bedside and was updated on plan of care.   - case d/w ACP

## 2025-04-20 NOTE — PROGRESS NOTE ADULT - PROBLEM SELECTOR PLAN 3
respiratory status stable, on home O2 2L NC prn  - continue advair BID, duoneb q6h   will dc Guafenesin, cont Hycodan as pt says it helps her

## 2025-04-20 NOTE — PROGRESS NOTE ADULT - PROBLEM SELECTOR PLAN 8
improving (was 65 on admission)  - infectious workup unrevealing - BCx/UA/CXR neg, QuantiFERON-TB Gold, fungal and viral serology pending  - ID evaluated at [facility] - empric zosyn x 7 days, rec flow cytometry, AFB, bacterial and fungal cx to be sent with biopsy  pt had worsening leucocytosis  , per daughter  her baseline is 14-15  - pt with  EBV IGM+ but no PCR detected ,  - ID f/u appreciated, leucocytosis likely reactive vs 2/2 malignancy  - ID rec to monitor off abx , will dc zosyn as pt completed 7 days of empiric treatment  - trend WBC    4/18: WBCs mildlly uptrending -  baseline ~14- 15 (as stated above). However, pt is clinically improving on Cefepime and does not show any signs of new infectious process. Will monitor for now. improving (was 65 on admission)  - infectious workup unrevealing - BCx/UA/CXR neg, QuantiFERON-TB Gold, fungal and viral serology pending  - ID evaluated at [facility] - empric zosyn x 7 days, rec flow cytometry, AFB, bacterial and fungal cx to be sent with biopsy  pt had worsening leucocytosis  , per daughter  her baseline is 14-15  - pt with  EBV IGM+ but no PCR detected ,  - ID f/u appreciated, leucocytosis likely reactive vs 2/2 malignancy  - ID rec to monitor off abx , will dc zosyn as pt completed 7 days of empiric treatment  - trend WBC    4/19: WBCs mildlly uptrending -  baseline ~14- 15 (as stated above). However, pt is clinically improving on Cefepime and does not show any signs of new infectious process. Will monitor for now.

## 2025-04-20 NOTE — PROGRESS NOTE ADULT - PROBLEM SELECTOR PLAN 7
febrile on admission to [facility]   NOW RESOLVED  - infectious workup unrevealing - BCx/UA/CXR neg, QuantiFERON-TB Gold, fungal and viral serology pending  - CT chest with LLL lung nodule - management as below, CT CAP without infectious etiology   - ID (Dr. Bui) evaluated at [facility] - rec flow cytometry, AFB, bacterial and fungal cx to be sent with biopsy. BAL negative for infection at this time , will f/u cytopathology   -on  Zosyn (planned for empiric 7 day course per ID at [facility]) -   -pt had worsening leucocytosis  ,now improving ,  per daughter  her baseline is 14-15  - pt with  EBV IGM+ but no PCR detected ,  -ID f/u appreciated, leucocytosis likely reactive vs 2/2 malignancy  - ID rec to monitor off abx , Dced zosyn as pt completed 7 days of empiric treatment

## 2025-04-20 NOTE — PROGRESS NOTE ADULT - ASSESSMENT
84F with hx of TAVR (2019), COPD (on PRN O2), HLD, S1 Fracture (02/2025 no surgical intervention) initially admitted to [facility] 3/31-4/4 for fever, found to have new LLL mass like consolidation, transferred to [facility] for bronchoscopy with interventional pulmonology- path consistent with SCC, negative bone scan, MRI brain limited study but no definitive evidence of metastatic disease, hosp. course complicated by HAP, now on cefepime, comfortable on NC 2L. No further inpatient heme/onc work-up is planned during this hospitalization.  Pt is planned to be discharged to [place] on Monday. 84F with hx of TAVR (2019), COPD (on PRN O2), HLD, S1 Fracture (02/2025 no surgical intervention) initially admitted to [facility] 3/31-4/4 for fever, found to have new LLL mass like consolidation, transferred to [facility] for bronchoscopy with interventional pulmonology- path consistent with SCC, negative bone scan, MRI brain limited study but no definitive evidence of metastatic disease, hosp. course complicated by HAP, now on cefepime (4/14- 4/21), comfortable on NC 2L. No further inpatient heme/onc work-up is planned during this hospitalization.  Pt is planned to be discharged to [place] on Monday.

## 2025-04-20 NOTE — PROGRESS NOTE ADULT - TIME BILLING
Time spent includes direct patient care  (interview and examination of patient), discussion with other providers, support staff and/or patient's family members, review of medical records, ordering diagnostic tests and analyzing results, and documentation.
The necessity of the time spent during the encounter on this date of service was due to:     Time spent reviewing the chart documentation, reviewing labs and imaging studies, evaluating the patient, discussing the plan of care with the consultants & medical team, and documenting.
time spent in review of laboratory data, radiology images and results, discussion with primary team/patient, and monitoring for potential decompensation. Interventions performed as documented above. In addition, time also spent to risks and benefits of the procedure, alternative procedures, diagnostic and therapeutic outcomes as well as further management plan. Complex patient requiring services. Interventional Pulmonology services provided to the patient were separate from general pulmonary service due to complexity of issues and interventions required. Time spent separate from time spent doing any procedures.
The necessity of the time spent during the encounter on this date of service was due to:     Time spent reviewing the chart documentation, reviewing labs and imaging studies, evaluating the patient, discussing the plan of care with the consultants & medical team, and documenting.
Time spent includes direct patient care  (interview and examination of patient), discussion with other providers, support staff and/or patient's family members, review of medical records, ordering diagnostic tests and analyzing results, and documentation.
preparing to see the patient (eg. review of tests), obtaining and/or reviewing separately obtained history, obtaining/reviewing vitals, performing a medically appropriate examination and/or evaluation, counseling and educating the patient/family/caregiver, reviewing previous notes and test results, and procedures, communicating with other health professionals (when not separately reported), and documenting clinical information in the electronic health record.
preparing to see the patient (eg. review of tests), obtaining and/or reviewing separately obtained history, obtaining/reviewing vitals, performing a medically appropriate examination and/or evaluation, counseling and educating the patient/family/caregiver, reviewing previous notes and test results, and procedures, communicating with other health professionals (when not separately reported), and documenting clinical information in the electronic health record.
The necessity of the time spent during the encounter on this date of service was due to:     Time spent reviewing the chart documentation, reviewing labs and imaging studies, evaluating the patient, discussing the plan of care with the consultants & medical team, and documenting.
Time spent includes direct patient care  (interview and examination of patient), discussion with other providers, support staff and/or patient's family members, review of medical records, ordering diagnostic tests and analyzing results, and documentation.
Time spent includes direct patient care  (interview and examination of patient), discussion with other providers, support staff and/or patient's family members, review of medical records, ordering diagnostic tests and analyzing results, and documentation.
The necessity of the time spent during the encounter on this date of service was due to:     Time spent reviewing the chart documentation, reviewing labs and imaging studies, evaluating the patient, discussing the plan of care with the consultants & medical team, and documenting.

## 2025-04-21 ENCOUNTER — TRANSCRIPTION ENCOUNTER (OUTPATIENT)
Age: 85
End: 2025-04-21

## 2025-04-21 VITALS
RESPIRATION RATE: 16 BRPM | HEART RATE: 92 BPM | SYSTOLIC BLOOD PRESSURE: 139 MMHG | DIASTOLIC BLOOD PRESSURE: 75 MMHG | OXYGEN SATURATION: 97 % | TEMPERATURE: 99 F

## 2025-04-21 LAB
ANION GAP SERPL CALC-SCNC: 13 MMOL/L — SIGNIFICANT CHANGE UP (ref 7–14)
BASOPHILS # BLD AUTO: 0.28 K/UL — HIGH (ref 0–0.2)
BASOPHILS NFR BLD AUTO: 1.5 % — SIGNIFICANT CHANGE UP (ref 0–2)
BUN SERPL-MCNC: 16 MG/DL — SIGNIFICANT CHANGE UP (ref 7–23)
CALCIUM SERPL-MCNC: 8.5 MG/DL — SIGNIFICANT CHANGE UP (ref 8.4–10.5)
CHLORIDE SERPL-SCNC: 100 MMOL/L — SIGNIFICANT CHANGE UP (ref 98–107)
CO2 SERPL-SCNC: 26 MMOL/L — SIGNIFICANT CHANGE UP (ref 22–31)
CREAT SERPL-MCNC: 1.05 MG/DL — SIGNIFICANT CHANGE UP (ref 0.5–1.3)
EGFR: 52 ML/MIN/1.73M2 — LOW
EGFR: 52 ML/MIN/1.73M2 — LOW
EOSINOPHIL # BLD AUTO: 0.09 K/UL — SIGNIFICANT CHANGE UP (ref 0–0.5)
EOSINOPHIL NFR BLD AUTO: 0.5 % — SIGNIFICANT CHANGE UP (ref 0–6)
GLUCOSE SERPL-MCNC: 88 MG/DL — SIGNIFICANT CHANGE UP (ref 70–99)
HCT VFR BLD CALC: 27.8 % — LOW (ref 34.5–45)
HGB BLD-MCNC: 8.3 G/DL — LOW (ref 11.5–15.5)
IANC: 10.66 K/UL — HIGH (ref 1.8–7.4)
IMM GRANULOCYTES NFR BLD AUTO: 7.2 % — HIGH (ref 0–0.9)
LYMPHOCYTES # BLD AUTO: 1.44 K/UL — SIGNIFICANT CHANGE UP (ref 1–3.3)
LYMPHOCYTES # BLD AUTO: 8 % — LOW (ref 13–44)
MAGNESIUM SERPL-MCNC: 2.1 MG/DL — SIGNIFICANT CHANGE UP (ref 1.6–2.6)
MCHC RBC-ENTMCNC: 16.6 PG — LOW (ref 27–34)
MCHC RBC-ENTMCNC: 29.9 G/DL — LOW (ref 32–36)
MCV RBC AUTO: 55.7 FL — LOW (ref 80–100)
MONOCYTES # BLD AUTO: 4.32 K/UL — HIGH (ref 0–0.9)
MONOCYTES NFR BLD AUTO: 23.9 % — HIGH (ref 2–14)
NEUTROPHILS # BLD AUTO: 10.66 K/UL — HIGH (ref 1.8–7.4)
NEUTROPHILS NFR BLD AUTO: 58.9 % — SIGNIFICANT CHANGE UP (ref 43–77)
NRBC # BLD AUTO: 0 K/UL — SIGNIFICANT CHANGE UP (ref 0–0)
NRBC # FLD: 0 K/UL — SIGNIFICANT CHANGE UP (ref 0–0)
NRBC BLD AUTO-RTO: 0 /100 WBCS — SIGNIFICANT CHANGE UP (ref 0–0)
PHOSPHATE SERPL-MCNC: 4.6 MG/DL — HIGH (ref 2.5–4.5)
PLATELET # BLD AUTO: 362 K/UL — SIGNIFICANT CHANGE UP (ref 150–400)
POTASSIUM SERPL-MCNC: 3.6 MMOL/L — SIGNIFICANT CHANGE UP (ref 3.5–5.3)
POTASSIUM SERPL-SCNC: 3.6 MMOL/L — SIGNIFICANT CHANGE UP (ref 3.5–5.3)
RBC # BLD: 4.99 M/UL — SIGNIFICANT CHANGE UP (ref 3.8–5.2)
RBC # FLD: 20.5 % — HIGH (ref 10.3–14.5)
SODIUM SERPL-SCNC: 139 MMOL/L — SIGNIFICANT CHANGE UP (ref 135–145)
WBC # BLD: 18.1 K/UL — HIGH (ref 3.8–10.5)
WBC # FLD AUTO: 18.1 K/UL — HIGH (ref 3.8–10.5)

## 2025-04-21 PROCEDURE — 99239 HOSP IP/OBS DSCHRG MGMT >30: CPT

## 2025-04-21 RX ORDER — PROMETHAZINE HYDROCHLORIDE 25 MG/ML
1 INJECTION INTRAMUSCULAR; INTRAVENOUS
Qty: 0 | Refills: 0 | DISCHARGE
Start: 2025-04-21

## 2025-04-21 RX ORDER — ACETAMINOPHEN 500 MG/5ML
2 LIQUID (ML) ORAL
Qty: 0 | Refills: 0 | DISCHARGE
Start: 2025-04-21

## 2025-04-21 RX ORDER — SENNA 187 MG
2 TABLET ORAL
Qty: 0 | Refills: 0 | DISCHARGE
Start: 2025-04-21

## 2025-04-21 RX ORDER — POLYETHYLENE GLYCOL 3350 17 G/17G
17 POWDER, FOR SOLUTION ORAL
Qty: 0 | Refills: 0 | DISCHARGE
Start: 2025-04-21

## 2025-04-21 RX ORDER — IPRATROPIUM BROMIDE AND ALBUTEROL SULFATE .5; 2.5 MG/3ML; MG/3ML
3 SOLUTION RESPIRATORY (INHALATION)
Qty: 0 | Refills: 0 | DISCHARGE
Start: 2025-04-21

## 2025-04-21 RX ORDER — ENOXAPARIN SODIUM 100 MG/ML
40 INJECTION SUBCUTANEOUS
Qty: 1 | Refills: 0
Start: 2025-04-21 | End: 2025-05-20

## 2025-04-21 RX ORDER — ROSUVASTATIN CALCIUM 20 MG/1
1 TABLET, FILM COATED ORAL
Qty: 0 | Refills: 0 | DISCHARGE
Start: 2025-04-21

## 2025-04-21 RX ORDER — LOSARTAN POTASSIUM 100 MG/1
1 TABLET, FILM COATED ORAL
Qty: 0 | Refills: 0 | DISCHARGE
Start: 2025-04-21

## 2025-04-21 RX ORDER — MELATONIN 5 MG
1 TABLET ORAL
Qty: 0 | Refills: 0 | DISCHARGE
Start: 2025-04-21

## 2025-04-21 RX ORDER — GABAPENTIN 400 MG/1
1 CAPSULE ORAL
Qty: 0 | Refills: 0 | DISCHARGE
Start: 2025-04-21

## 2025-04-21 RX ORDER — HYDROCODONE/HOMATROPINE
5 SYRUP ORAL
Qty: 0 | Refills: 0 | DISCHARGE
Start: 2025-04-21

## 2025-04-21 RX ORDER — LOSARTAN POTASSIUM 100 MG/1
1 TABLET, FILM COATED ORAL
Refills: 0 | DISCHARGE

## 2025-04-21 RX ORDER — ENOXAPARIN SODIUM 100 MG/ML
40 INJECTION SUBCUTANEOUS
Qty: 0 | Refills: 0 | DISCHARGE

## 2025-04-21 RX ORDER — PIPERACILLIN-TAZO-DEXTROSE,ISO 3.375G/5
3.38 IV SOLUTION, PIGGYBACK PREMIX FROZEN(ML) INTRAVENOUS
Qty: 0 | Refills: 0 | DISCHARGE

## 2025-04-21 RX ADMIN — Medication 40 MILLIGRAM(S): at 06:04

## 2025-04-21 RX ADMIN — GABAPENTIN 100 MILLIGRAM(S): 400 CAPSULE ORAL at 06:04

## 2025-04-21 RX ADMIN — ENOXAPARIN SODIUM 40 MILLIGRAM(S): 100 INJECTION SUBCUTANEOUS at 06:04

## 2025-04-21 RX ADMIN — IPRATROPIUM BROMIDE AND ALBUTEROL SULFATE 3 MILLILITER(S): .5; 2.5 SOLUTION RESPIRATORY (INHALATION) at 04:00

## 2025-04-21 RX ADMIN — LOSARTAN POTASSIUM 50 MILLIGRAM(S): 100 TABLET, FILM COATED ORAL at 06:04

## 2025-04-21 RX ADMIN — Medication 1 LOZENGE: at 06:04

## 2025-04-21 NOTE — DISCHARGE NOTE NURSING/CASE MANAGEMENT/SOCIAL WORK - NSDCVIVACCINE_GEN_ALL_CORE_FT
Tdap; 29-Nov-2020 19:39; Stefanie Tobar (MAGNOLIA); [employer]; [id number] (Exp. Date: 31-Jul-2022); IntraMuscular; Dorsogluteal Right.; 0.5 milliLiter(s); VIS (VIS Published: 09-May-2013, VIS Presented: 29-Nov-2020);

## 2025-04-21 NOTE — DISCHARGE NOTE NURSING/CASE MANAGEMENT/SOCIAL WORK - PATIENT PORTAL LINK FT
You can access the FollowMyHealth Patient Portal offered by [facility] by registering at the following website: [email or web address]. By joining [employer]’s FollowMyHealth portal, you will also be able to view your health information using other applications (apps) compatible with our system.

## 2025-04-21 NOTE — DISCHARGE NOTE NURSING/CASE MANAGEMENT/SOCIAL WORK - FINANCIAL ASSISTANCE
[facility] provides services at a reduced cost to those who are determined to be eligible through [facility]’s financial assistance program. Information regarding [facility]’s financial assistance program can be found by going to [email or web address] or by calling [phone].

## 2025-05-07 LAB
CULTURE RESULTS: SIGNIFICANT CHANGE UP
CULTURE RESULTS: SIGNIFICANT CHANGE UP
SPECIMEN SOURCE: SIGNIFICANT CHANGE UP
SPECIMEN SOURCE: SIGNIFICANT CHANGE UP

## 2025-05-20 ENCOUNTER — NON-APPOINTMENT (OUTPATIENT)
Age: 85
End: 2025-05-20

## 2025-05-22 ENCOUNTER — APPOINTMENT (OUTPATIENT)
Dept: PULMONOLOGY | Facility: CLINIC | Age: 85
End: 2025-05-22

## 2025-05-25 ENCOUNTER — OUTPATIENT (OUTPATIENT)
Dept: OUTPATIENT SERVICES | Facility: HOSPITAL | Age: 85
LOS: 1 days | Discharge: HOME | End: 2025-05-25

## 2025-05-25 DIAGNOSIS — C34.90 MALIGNANT NEOPLASM OF UNSPECIFIED PART OF UNSPECIFIED BRONCHUS OR LUNG: ICD-10-CM

## 2025-06-20 ENCOUNTER — APPOINTMENT (OUTPATIENT)
Dept: HEMATOLOGY ONCOLOGY | Facility: CLINIC | Age: 85
End: 2025-06-20
Payer: MEDICARE

## 2025-06-20 ENCOUNTER — NON-APPOINTMENT (OUTPATIENT)
Age: 85
End: 2025-06-20

## 2025-06-20 VITALS
WEIGHT: 170 LBS | HEIGHT: 65 IN | RESPIRATION RATE: 18 BRPM | DIASTOLIC BLOOD PRESSURE: 76 MMHG | OXYGEN SATURATION: 93 % | TEMPERATURE: 97.7 F | BODY MASS INDEX: 28.32 KG/M2 | SYSTOLIC BLOOD PRESSURE: 135 MMHG | HEART RATE: 94 BPM

## 2025-06-20 PROCEDURE — G2211 COMPLEX E/M VISIT ADD ON: CPT

## 2025-06-20 PROCEDURE — 99215 OFFICE O/P EST HI 40 MIN: CPT

## 2025-06-27 ENCOUNTER — APPOINTMENT (OUTPATIENT)
Dept: HEMATOLOGY ONCOLOGY | Facility: CLINIC | Age: 85
End: 2025-06-27

## (undated) DEVICE — SYR SLIP 10CC

## (undated) DEVICE — DRSG CURITY GAUZE SPONGE 4 X 4" 12-PLY

## (undated) DEVICE — BIOPSY FORCEP OLYMPUS ALLIGATOR 2.0MM

## (undated) DEVICE — NDL ASPIRATION VIZISHOT2 22G

## (undated) DEVICE — BIOPSY FORCEP J&J MONARCH SMOOTH CUP

## (undated) DEVICE — DRAPE TOWEL BLUE 17" X 24"

## (undated) DEVICE — GLV 8 PROTEXIS (WHITE)

## (undated) DEVICE — BRONCHOSCOPE GALAXY CONN IRR ASPIR TUBE SCP GUIDE

## (undated) DEVICE — TRAP SPECIMEN SPUTUM 40CC

## (undated) DEVICE — NDL ARCHPOINT PULMONARY 21G

## (undated) DEVICE — WARMING BLANKET LOWER ADULT

## (undated) DEVICE — VENODYNE/SCD SLEEVE CALF MEDIUM

## (undated) DEVICE — DRAPE 3/4 SHEET 52X76"

## (undated) DEVICE — PACK BRONCHOSCOPY